# Patient Record
Sex: FEMALE | Race: WHITE | NOT HISPANIC OR LATINO | Employment: OTHER | URBAN - METROPOLITAN AREA
[De-identification: names, ages, dates, MRNs, and addresses within clinical notes are randomized per-mention and may not be internally consistent; named-entity substitution may affect disease eponyms.]

---

## 2017-05-02 ENCOUNTER — APPOINTMENT (OUTPATIENT)
Dept: PHYSICAL THERAPY | Facility: CLINIC | Age: 72
End: 2017-05-02
Payer: COMMERCIAL

## 2017-05-02 PROCEDURE — 97161 PT EVAL LOW COMPLEX 20 MIN: CPT

## 2017-05-02 PROCEDURE — 97140 MANUAL THERAPY 1/> REGIONS: CPT

## 2017-05-05 ENCOUNTER — APPOINTMENT (OUTPATIENT)
Dept: PHYSICAL THERAPY | Facility: CLINIC | Age: 72
End: 2017-05-05
Payer: COMMERCIAL

## 2017-05-05 PROCEDURE — 97140 MANUAL THERAPY 1/> REGIONS: CPT

## 2017-05-05 PROCEDURE — 97110 THERAPEUTIC EXERCISES: CPT

## 2017-05-08 ENCOUNTER — APPOINTMENT (OUTPATIENT)
Dept: PHYSICAL THERAPY | Facility: CLINIC | Age: 72
End: 2017-05-08
Payer: COMMERCIAL

## 2017-05-08 PROCEDURE — 97140 MANUAL THERAPY 1/> REGIONS: CPT

## 2017-05-08 PROCEDURE — 97110 THERAPEUTIC EXERCISES: CPT

## 2017-05-10 ENCOUNTER — APPOINTMENT (OUTPATIENT)
Dept: PHYSICAL THERAPY | Facility: CLINIC | Age: 72
End: 2017-05-10
Payer: COMMERCIAL

## 2017-05-10 PROCEDURE — 97110 THERAPEUTIC EXERCISES: CPT

## 2017-05-10 PROCEDURE — 97140 MANUAL THERAPY 1/> REGIONS: CPT

## 2017-05-12 ENCOUNTER — APPOINTMENT (OUTPATIENT)
Dept: PHYSICAL THERAPY | Facility: CLINIC | Age: 72
End: 2017-05-12
Payer: COMMERCIAL

## 2017-05-12 PROCEDURE — 97110 THERAPEUTIC EXERCISES: CPT

## 2017-05-12 PROCEDURE — 97140 MANUAL THERAPY 1/> REGIONS: CPT

## 2017-05-15 ENCOUNTER — APPOINTMENT (OUTPATIENT)
Dept: PHYSICAL THERAPY | Facility: CLINIC | Age: 72
End: 2017-05-15
Payer: COMMERCIAL

## 2017-05-15 PROCEDURE — 97140 MANUAL THERAPY 1/> REGIONS: CPT

## 2017-05-15 PROCEDURE — 97110 THERAPEUTIC EXERCISES: CPT

## 2017-05-16 ENCOUNTER — APPOINTMENT (OUTPATIENT)
Dept: PHYSICAL THERAPY | Facility: CLINIC | Age: 72
End: 2017-05-16
Payer: COMMERCIAL

## 2017-05-16 PROCEDURE — 97110 THERAPEUTIC EXERCISES: CPT

## 2017-05-16 PROCEDURE — 97140 MANUAL THERAPY 1/> REGIONS: CPT

## 2017-05-18 ENCOUNTER — APPOINTMENT (OUTPATIENT)
Dept: PHYSICAL THERAPY | Facility: CLINIC | Age: 72
End: 2017-05-18
Payer: COMMERCIAL

## 2017-05-18 PROCEDURE — 97110 THERAPEUTIC EXERCISES: CPT

## 2017-05-18 PROCEDURE — 97140 MANUAL THERAPY 1/> REGIONS: CPT

## 2017-05-19 ENCOUNTER — APPOINTMENT (OUTPATIENT)
Dept: PHYSICAL THERAPY | Facility: CLINIC | Age: 72
End: 2017-05-19
Payer: COMMERCIAL

## 2017-05-22 ENCOUNTER — APPOINTMENT (OUTPATIENT)
Dept: PHYSICAL THERAPY | Facility: CLINIC | Age: 72
End: 2017-05-22
Payer: COMMERCIAL

## 2017-05-22 PROCEDURE — 97110 THERAPEUTIC EXERCISES: CPT

## 2017-05-22 PROCEDURE — 97140 MANUAL THERAPY 1/> REGIONS: CPT

## 2017-05-23 ENCOUNTER — APPOINTMENT (OUTPATIENT)
Dept: PHYSICAL THERAPY | Facility: CLINIC | Age: 72
End: 2017-05-23
Payer: COMMERCIAL

## 2017-05-23 PROCEDURE — 97140 MANUAL THERAPY 1/> REGIONS: CPT

## 2017-05-23 PROCEDURE — 97110 THERAPEUTIC EXERCISES: CPT

## 2017-05-26 ENCOUNTER — APPOINTMENT (OUTPATIENT)
Dept: PHYSICAL THERAPY | Facility: CLINIC | Age: 72
End: 2017-05-26
Payer: COMMERCIAL

## 2017-05-26 PROCEDURE — 97140 MANUAL THERAPY 1/> REGIONS: CPT

## 2017-05-26 PROCEDURE — 97110 THERAPEUTIC EXERCISES: CPT

## 2017-05-30 ENCOUNTER — APPOINTMENT (OUTPATIENT)
Dept: PHYSICAL THERAPY | Facility: CLINIC | Age: 72
End: 2017-05-30
Payer: COMMERCIAL

## 2017-05-30 PROCEDURE — 97010 HOT OR COLD PACKS THERAPY: CPT

## 2017-05-30 PROCEDURE — 97110 THERAPEUTIC EXERCISES: CPT

## 2017-05-30 PROCEDURE — 97140 MANUAL THERAPY 1/> REGIONS: CPT

## 2017-06-01 ENCOUNTER — APPOINTMENT (OUTPATIENT)
Dept: PHYSICAL THERAPY | Facility: CLINIC | Age: 72
End: 2017-06-01
Payer: COMMERCIAL

## 2017-06-01 PROCEDURE — 97140 MANUAL THERAPY 1/> REGIONS: CPT

## 2017-06-01 PROCEDURE — 97110 THERAPEUTIC EXERCISES: CPT

## 2017-06-02 ENCOUNTER — APPOINTMENT (OUTPATIENT)
Dept: PHYSICAL THERAPY | Facility: CLINIC | Age: 72
End: 2017-06-02
Payer: COMMERCIAL

## 2017-06-02 PROCEDURE — 97110 THERAPEUTIC EXERCISES: CPT

## 2017-06-02 PROCEDURE — 97140 MANUAL THERAPY 1/> REGIONS: CPT

## 2017-07-10 ENCOUNTER — APPOINTMENT (OUTPATIENT)
Dept: PHYSICAL THERAPY | Facility: CLINIC | Age: 72
End: 2017-07-10
Payer: COMMERCIAL

## 2017-07-10 PROCEDURE — 97110 THERAPEUTIC EXERCISES: CPT

## 2017-07-10 PROCEDURE — G8990 OTHER PT/OT CURRENT STATUS: HCPCS | Performed by: PHYSICAL THERAPIST

## 2017-07-10 PROCEDURE — G8991 OTHER PT/OT GOAL STATUS: HCPCS | Performed by: PHYSICAL THERAPIST

## 2017-07-10 PROCEDURE — 97162 PT EVAL MOD COMPLEX 30 MIN: CPT

## 2017-07-14 ENCOUNTER — APPOINTMENT (OUTPATIENT)
Dept: PHYSICAL THERAPY | Facility: CLINIC | Age: 72
End: 2017-07-14
Payer: COMMERCIAL

## 2017-07-14 PROCEDURE — 97110 THERAPEUTIC EXERCISES: CPT

## 2017-07-17 ENCOUNTER — APPOINTMENT (OUTPATIENT)
Dept: PHYSICAL THERAPY | Facility: CLINIC | Age: 72
End: 2017-07-17
Payer: COMMERCIAL

## 2017-07-17 PROCEDURE — 97110 THERAPEUTIC EXERCISES: CPT

## 2017-07-20 ENCOUNTER — APPOINTMENT (OUTPATIENT)
Dept: PHYSICAL THERAPY | Facility: CLINIC | Age: 72
End: 2017-07-20
Payer: COMMERCIAL

## 2017-07-20 PROCEDURE — 97110 THERAPEUTIC EXERCISES: CPT

## 2017-07-24 ENCOUNTER — APPOINTMENT (OUTPATIENT)
Dept: PHYSICAL THERAPY | Facility: CLINIC | Age: 72
End: 2017-07-24
Payer: COMMERCIAL

## 2017-07-24 PROCEDURE — 97110 THERAPEUTIC EXERCISES: CPT

## 2017-07-27 ENCOUNTER — APPOINTMENT (OUTPATIENT)
Dept: PHYSICAL THERAPY | Facility: CLINIC | Age: 72
End: 2017-07-27
Payer: COMMERCIAL

## 2017-07-27 PROCEDURE — 97110 THERAPEUTIC EXERCISES: CPT

## 2017-08-01 ENCOUNTER — APPOINTMENT (OUTPATIENT)
Dept: PHYSICAL THERAPY | Facility: CLINIC | Age: 72
End: 2017-08-01
Payer: COMMERCIAL

## 2017-08-01 PROCEDURE — 97110 THERAPEUTIC EXERCISES: CPT

## 2017-08-03 ENCOUNTER — APPOINTMENT (OUTPATIENT)
Dept: PHYSICAL THERAPY | Facility: CLINIC | Age: 72
End: 2017-08-03
Payer: COMMERCIAL

## 2017-08-03 PROCEDURE — 97110 THERAPEUTIC EXERCISES: CPT

## 2017-08-16 ENCOUNTER — APPOINTMENT (OUTPATIENT)
Dept: PHYSICAL THERAPY | Facility: CLINIC | Age: 72
End: 2017-08-16
Payer: COMMERCIAL

## 2017-08-16 PROCEDURE — 97110 THERAPEUTIC EXERCISES: CPT

## 2017-08-18 ENCOUNTER — APPOINTMENT (OUTPATIENT)
Dept: PHYSICAL THERAPY | Facility: CLINIC | Age: 72
End: 2017-08-18
Payer: COMMERCIAL

## 2017-08-18 PROCEDURE — 97110 THERAPEUTIC EXERCISES: CPT

## 2017-08-23 ENCOUNTER — APPOINTMENT (OUTPATIENT)
Dept: PHYSICAL THERAPY | Facility: CLINIC | Age: 72
End: 2017-08-23
Payer: COMMERCIAL

## 2017-08-23 PROCEDURE — 97110 THERAPEUTIC EXERCISES: CPT

## 2017-08-25 ENCOUNTER — APPOINTMENT (OUTPATIENT)
Dept: PHYSICAL THERAPY | Facility: CLINIC | Age: 72
End: 2017-08-25
Payer: COMMERCIAL

## 2017-08-25 PROCEDURE — 97110 THERAPEUTIC EXERCISES: CPT

## 2017-08-31 ENCOUNTER — APPOINTMENT (OUTPATIENT)
Dept: PHYSICAL THERAPY | Facility: CLINIC | Age: 72
End: 2017-08-31
Payer: COMMERCIAL

## 2017-08-31 PROCEDURE — 97110 THERAPEUTIC EXERCISES: CPT

## 2018-03-14 ENCOUNTER — TRANSCRIBE ORDERS (OUTPATIENT)
Dept: LAB | Facility: CLINIC | Age: 73
End: 2018-03-14

## 2018-03-14 ENCOUNTER — APPOINTMENT (OUTPATIENT)
Dept: LAB | Facility: CLINIC | Age: 73
End: 2018-03-14
Payer: COMMERCIAL

## 2018-03-14 DIAGNOSIS — E78.5 HYPERLIPIDEMIA, UNSPECIFIED HYPERLIPIDEMIA TYPE: Primary | ICD-10-CM

## 2018-03-14 DIAGNOSIS — M79.10 MYALGIA: ICD-10-CM

## 2018-03-14 DIAGNOSIS — R73.01 IMPAIRED FASTING GLUCOSE: ICD-10-CM

## 2018-03-14 DIAGNOSIS — D05.00 LOBULAR CARCINOMA IN SITU OF BREAST, UNSPECIFIED LATERALITY: ICD-10-CM

## 2018-03-14 LAB
ALBUMIN SERPL BCP-MCNC: 3.9 G/DL (ref 3.5–5)
ALP SERPL-CCNC: 82 U/L (ref 46–116)
ALT SERPL W P-5'-P-CCNC: 42 U/L (ref 12–78)
ANION GAP SERPL CALCULATED.3IONS-SCNC: 5 MMOL/L (ref 4–13)
AST SERPL W P-5'-P-CCNC: 18 U/L (ref 5–45)
BASOPHILS # BLD AUTO: 0.06 THOUSANDS/ΜL (ref 0–0.1)
BASOPHILS NFR BLD AUTO: 1 % (ref 0–1)
BILIRUB SERPL-MCNC: 0.46 MG/DL (ref 0.2–1)
BUN SERPL-MCNC: 16 MG/DL (ref 5–25)
CALCIUM SERPL-MCNC: 9.1 MG/DL (ref 8.3–10.1)
CHLORIDE SERPL-SCNC: 106 MMOL/L (ref 100–108)
CHOLEST SERPL-MCNC: 248 MG/DL (ref 50–200)
CO2 SERPL-SCNC: 30 MMOL/L (ref 21–32)
CREAT SERPL-MCNC: 0.75 MG/DL (ref 0.6–1.3)
EOSINOPHIL # BLD AUTO: 0.13 THOUSAND/ΜL (ref 0–0.61)
EOSINOPHIL NFR BLD AUTO: 2 % (ref 0–6)
ERYTHROCYTE [DISTWIDTH] IN BLOOD BY AUTOMATED COUNT: 13.5 % (ref 11.6–15.1)
ERYTHROCYTE [SEDIMENTATION RATE] IN BLOOD: 7 MM/HOUR (ref 0–20)
EST. AVERAGE GLUCOSE BLD GHB EST-MCNC: 120 MG/DL
GFR SERPL CREATININE-BSD FRML MDRD: 79 ML/MIN/1.73SQ M
GLUCOSE P FAST SERPL-MCNC: 86 MG/DL (ref 65–99)
HBA1C MFR BLD: 5.8 % (ref 4.2–6.3)
HCT VFR BLD AUTO: 46.6 % (ref 34.8–46.1)
HDLC SERPL-MCNC: 61 MG/DL (ref 40–60)
HGB BLD-MCNC: 15.4 G/DL (ref 11.5–15.4)
LDLC SERPL CALC-MCNC: 157 MG/DL (ref 0–100)
LYMPHOCYTES # BLD AUTO: 2.58 THOUSANDS/ΜL (ref 0.6–4.47)
LYMPHOCYTES NFR BLD AUTO: 48 % (ref 14–44)
MCH RBC QN AUTO: 29.5 PG (ref 26.8–34.3)
MCHC RBC AUTO-ENTMCNC: 33 G/DL (ref 31.4–37.4)
MCV RBC AUTO: 89 FL (ref 82–98)
MONOCYTES # BLD AUTO: 0.51 THOUSAND/ΜL (ref 0.17–1.22)
MONOCYTES NFR BLD AUTO: 9 % (ref 4–12)
NEUTROPHILS # BLD AUTO: 2.23 THOUSANDS/ΜL (ref 1.85–7.62)
NEUTS SEG NFR BLD AUTO: 40 % (ref 43–75)
NRBC BLD AUTO-RTO: 0 /100 WBCS
PLATELET # BLD AUTO: 412 THOUSANDS/UL (ref 149–390)
PMV BLD AUTO: 10 FL (ref 8.9–12.7)
POTASSIUM SERPL-SCNC: 4 MMOL/L (ref 3.5–5.3)
PROT SERPL-MCNC: 7.6 G/DL (ref 6.4–8.2)
RBC # BLD AUTO: 5.22 MILLION/UL (ref 3.81–5.12)
SODIUM SERPL-SCNC: 141 MMOL/L (ref 136–145)
TRIGL SERPL-MCNC: 151 MG/DL
WBC # BLD AUTO: 5.52 THOUSAND/UL (ref 4.31–10.16)

## 2018-03-14 PROCEDURE — 80061 LIPID PANEL: CPT

## 2018-03-14 PROCEDURE — 86147 CARDIOLIPIN ANTIBODY EA IG: CPT

## 2018-03-14 PROCEDURE — 85025 COMPLETE CBC W/AUTO DIFF WBC: CPT

## 2018-03-14 PROCEDURE — 83036 HEMOGLOBIN GLYCOSYLATED A1C: CPT

## 2018-03-14 PROCEDURE — 86038 ANTINUCLEAR ANTIBODIES: CPT

## 2018-03-14 PROCEDURE — 80053 COMPREHEN METABOLIC PANEL: CPT

## 2018-03-14 PROCEDURE — 36415 COLL VENOUS BLD VENIPUNCTURE: CPT

## 2018-03-14 PROCEDURE — 85652 RBC SED RATE AUTOMATED: CPT

## 2018-03-15 LAB
CARDIOLIPIN IGA SER IA-ACNC: <9 APL U/ML (ref 0–11)
CARDIOLIPIN IGG SER IA-ACNC: <9 GPL U/ML (ref 0–14)
CARDIOLIPIN IGM SER IA-ACNC: <9 MPL U/ML (ref 0–12)

## 2018-03-16 LAB — RYE IGE QN: NEGATIVE

## 2018-05-07 ENCOUNTER — TELEPHONE (OUTPATIENT)
Dept: INTERNAL MEDICINE CLINIC | Facility: CLINIC | Age: 73
End: 2018-05-07

## 2018-11-13 RX ORDER — OXYCODONE HYDROCHLORIDE 5 MG/1
5-10 TABLET ORAL EVERY 4 HOURS
COMMUNITY
Start: 2017-04-13 | End: 2018-11-14 | Stop reason: ALTCHOICE

## 2018-11-13 RX ORDER — ATORVASTATIN CALCIUM 10 MG/1
TABLET, FILM COATED ORAL
Refills: 0 | COMMUNITY
Start: 2018-10-25

## 2018-11-13 RX ORDER — CELECOXIB 100 MG/1
100 CAPSULE ORAL
COMMUNITY
Start: 2017-04-13 | End: 2018-11-14 | Stop reason: ALTCHOICE

## 2018-11-13 RX ORDER — ACETAMINOPHEN 500 MG
500 TABLET ORAL EVERY 6 HOURS
COMMUNITY

## 2018-11-14 ENCOUNTER — CONSULT (OUTPATIENT)
Dept: VASCULAR SURGERY | Facility: CLINIC | Age: 73
End: 2018-11-14
Payer: COMMERCIAL

## 2018-11-14 VITALS
DIASTOLIC BLOOD PRESSURE: 64 MMHG | RESPIRATION RATE: 18 BRPM | TEMPERATURE: 98.4 F | HEART RATE: 64 BPM | HEIGHT: 66 IN | BODY MASS INDEX: 26.2 KG/M2 | SYSTOLIC BLOOD PRESSURE: 112 MMHG | WEIGHT: 163 LBS

## 2018-11-14 DIAGNOSIS — I83.893 SYMPTOMATIC VARICOSE VEINS OF BOTH LOWER EXTREMITIES: Primary | ICD-10-CM

## 2018-11-14 DIAGNOSIS — I87.2 VENOUS INSUFFICIENCY OF BOTH LOWER EXTREMITIES: ICD-10-CM

## 2018-11-14 PROCEDURE — 99204 OFFICE O/P NEW MOD 45 MIN: CPT | Performed by: PHYSICIAN ASSISTANT

## 2018-11-14 NOTE — PATIENT INSTRUCTIONS
Bilateral varicose veins  Concern for venous insufficiency     We had a detailed discussion regarding Stormy's varicose veins, as well as the pthophysiology and treatment of venous disease  We discussed that treatment is symptoms driven  Nye Latin has daily symptoms of tired, aching, painful legs L>R which has been ongoing for 1 5 years  The legs are worse when she is on her feet all day and better when she raises her legs  She does not use compression stockings  She has had no significant pain in the veins themselves  No history of redness  No history of phlebitis or blood clots  We discussed the role of a formal, graded compression stockings  She is given a script for compression stockings and will see us back as needed  Recommendations:  - Order for prescription graded compression stockings of 20-30 mm Hg  - Wear stocking EVERY day to help control swelling in legs and remove at night  - Elevate legs while at rest  - You can use a good moisturizer to legs to help with skin hygiene  - If you get a "flare up" of pain in vein, redness - please let us know and come back sooner  - Continue healthy life-style changes; heart-healthy, low sodium diet; regular exercise for weight loss  - Patient education for venous insufficiency  - Follow up as needed for any worsening of symptoms - swelling, pain, fatigue, aching, heaviness  Varicose Veins, Ambulatory Care   GENERAL INFORMATION:   Varicose veins  are veins that become large, twisted, and swollen  They are common on the back of your calves, knees, and thighs     Common symptoms include the following:   · Blue, purple, or bulging veins in your legs     · Pain, swelling, or muscle cramps in your legs    · Feeling of heaviness in your legs  Seek immediate care for the following symptoms:   · A wound that does not heal or is infected    · An injury that has broken your skin and caused your varicose veins to bleed    · Swollen and hard leg    · Pain in your leg that does not go away or gets worse    · Legs or feet turn blue or black    · Warmth, tenderness, and pain in your leg (may also look swollen and red)  Treatment of varicose veins  aims to decrease symptoms, improve appearance, and prevent further problems  Treatment will depend on which veins are affected and how severe your condition is  Prescription pain medicine may be given  Ask how to take this medicine safely  Procedures may be done to remove your varicose veins  Your healthcare provider may inject a solution or use a laser to close the varicose veins  Surgery to remove long veins may also be done  Ask your healthcare provider for more information about procedures used in treating varicose veins  Manage varicose veins:   · Wear pressure stockings  The stockings are tight and put pressure on your legs  They improve blood flow and help prevent clots  · Elevate  your legs above the level of your heart for 15 to 30 minutes several times a day  This will help blood to flow back to your heart  · Avoid sitting or standing for long periods of time  This can cause the blood to collect in your legs and make your symptoms worse  Walk around for a few minutes every hour to get blood moving in your legs  · Avoid wearing tight clothing and shoes  Avoid wearing high-heeled shoes  Do not wear clothes that are tight around the waist     · Get plenty of exercise  Talk to your healthcare provider about the best exercise plan for you  Exercise can decrease your blood pressure and improve your health  Bend or rotate your ankles several times every hour  This will help blood to flow back to the heart  · Maintain a healthy weight  Your heart works harder when you are overweight and this can make varicose vein worse  Ask how much you should weigh  Ask him to help you create a weight loss plan if you are overweight  · Do not smoke  If you smoke, it is never too late to quit   Ask for information if you need help quitting  Follow up with your healthcare provider as directed:  Write down your questions so you remember to ask them during your visits  CARE AGREEMENT:   You have the right to help plan your care  Learn about your health condition and how it may be treated  Discuss treatment options with your caregivers to decide what care you want to receive  You always have the right to refuse treatment  The above information is an  only  It is not intended as medical advice for individual conditions or treatments  Talk to your doctor, nurse or pharmacist before following any medical regimen to see if it is safe and effective for you  © 2014 [address] is for End User's use only and may not be sold, redistributed or otherwise used for commercial purposes  All illustrations and images included in CareNotes® are the copyrighted property of [employer]  or Diallo Suarez

## 2018-11-14 NOTE — PROGRESS NOTES
Assessment/Plan:    Venous insufficiency of both lower extremities  -  Daily symptoms of tired, aching, painful legs (mostly L) with mild swelling and bilateral venous varicosities  -  Will give trial of graded compression stockings and she will follow up as needed  The detailed plan as per varicose veins  Symptomatic varicose veins of both lower extremities  79 y/o F with daily symptoms of tired, aching, painful legs L>R which has been ongoing for 1 5 years  The legs are worse when she is on her feet all day and better when she raises her legs  She does not use compression stockings  She has had no significant pain in the veins themselves  No history of redness  No history of phlebitis or blood clots  We had a detailed discussion regarding Stormy's varicose veins, as well as the pathophysiology and treatment of venous disease  We discussed that treatment is symptoms driven  I reassured Britt Alisson that her vein disease is not dangerous  There are no high risk features  We discussed the role of a formal, graded compression stockings  She is given a script for compression stockings and will see us back as needed  However, should she continue to have symptoms despite use of compression and conservative measures, or if she the develops pain,  swelling or redness of the veins themselves we should see her back  - Prescription for graded compression stockings 20-30 mm  - Patient education provided for venous insufficiency and healthy-lifestyle changes  - She will follow-up as needed for continued or worsening symptoms         Subjective:      Patient ID: Pamela Haynes is a 75 y o  female  Patient is new to our practice  She has no recent testing  She has bulging painful veins in her L leg  She states she has bulging veins on her right leg as well but L >R  She does not wear compression stockings  She does elevate her legs as she can  She takes ASA and atorvastatin        HPI   Britt Vinson 74 y/o F HLD, Hx retinal edema who comes in to discuss varicose veins  Veins have been present for over 20 years  She has a left medial thigh large truncal vein that bulges which she is quite concerned about  The vein does not particularly give her any problems such as pain, tenderness or redness  She complains of daily symptoms of tired, aching, painful legs L>R which has been ongoing for 1 5 years  The legs are worse when she is on her feet all day and better when she raises her legs  Interestingly, she states that the symptoms began after her left knee replacement  She does not use compression stockings  No history of redness  No history of phlebitis or blood clots  She has had longstanding varicose veins  She might be interested in having a laser ablation to the left medial truncal veins  I explained to her that this is a possibility  However, generally treatment is symptom based and I recommend that we give a trial of compression stockings and see how she does  20 years ago she had some injections of smaller veins in both legs  She ended up with worsening veins to the back of the right knee after that procedure  The following portions of the patient's history were reviewed and updated as appropriate: allergies, current medications, past family history, past medical history, past social history, past surgical history and problem list     Review of Systems   Constitutional: Negative  HENT: Negative  Eyes: Negative  Respiratory: Negative  Cardiovascular: Negative  Endocrine: Negative  Genitourinary: Negative  Musculoskeletal: Negative  Skin: Negative  Allergic/Immunologic: Negative  Neurological: Negative  Hematological: Negative  Psychiatric/Behavioral: Negative          Objective:  /64 (BP Location: Left arm, Patient Position: Sitting)   Pulse 64   Temp 98 4 °F (36 9 °C)   Resp 18   Ht 5' 6" (1 676 m)   Wt 73 9 kg (163 lb)   BMI 26 31 kg/m²      Physical Exam Constitutional: She is oriented to person, place, and time  She appears well-developed and well-nourished  She is cooperative  HENT:   Head: Normocephalic and atraumatic  Eyes: Pupils are equal, round, and reactive to light  EOM are normal    Neck: Trachea normal  Neck supple  No JVD present  No thyromegaly present  Cardiovascular: Normal rate, regular rhythm, S1 normal, S2 normal and normal heart sounds  Exam reveals no gallop and no friction rub  No murmur heard  Pulses:       Carotid pulses are 2+ on the right side, and 2+ on the left side  Radial pulses are 2+ on the right side, and 2+ on the left side  Popliteal pulses are 2+ on the right side, and 2+ on the left side  Dorsalis pedis pulses are 2+ on the right side, and 2+ on the left side  Posterior tibial pulses are 2+ on the right side, and 2+ on the left side  1-2 + DP/PT palpable pulses    The feet are warm and well perfused    Mild LE edema - mostly ankle; very mild venous stasis changes    + bilat varicose veins     L anterio-medial truncal vein; bilateral smaller spiders   Pulmonary/Chest: Effort normal and breath sounds normal  No accessory muscle usage  No respiratory distress  She has no wheezes  She has no rales  Abdominal: Soft  Bowel sounds are normal  She exhibits no distension  There is no hepatosplenomegaly  There is no tenderness  obese   Musculoskeletal: Normal range of motion  She exhibits edema  She exhibits no deformity  Neurological: She is alert and oriented to person, place, and time  Grossly normal    Skin: Skin is warm and dry  No lesion and no rash noted  No cyanosis  Nails show no clubbing  Psychiatric: She has a normal mood and affect  Nursing note and vitals reviewed                  R posterior lower leg        LEFT anterior-medial      Vitals:    11/14/18 1114   BP: 112/64   BP Location: Left arm   Patient Position: Sitting   Pulse: 64   Resp: 18   Temp: 98 4 °F (36 9 °C) Weight: 73 9 kg (163 lb)   Height: 5' 6" (1 676 m)       Patient Active Problem List   Diagnosis    Symptomatic varicose veins of both lower extremities    Venous insufficiency of both lower extremities       Past Surgical History:   Procedure Laterality Date    BACK SURGERY      BREAST SURGERY      FOOT SURGERY      HIP SURGERY Left     KNEE SURGERY Left        Family History   Problem Relation Age of Onset    Heart disease Mother     Cancer Mother     Varicose Veins Mother     Heart disease Father     Heart disease Sister     Cancer Sister     Cancer Brother     Varicose Veins Maternal Grandmother        Social History     Social History    Marital status: /Civil Union     Spouse name: N/A    Number of children: N/A    Years of education: N/A     Occupational History    Not on file       Social History Main Topics    Smoking status: Never Smoker    Smokeless tobacco: Never Used    Alcohol use Yes      Comment: rarely    Drug use: No    Sexual activity: Not on file     Other Topics Concern    Not on file     Social History Narrative    No narrative on file       Allergies   Allergen Reactions    Tramadol GI Intolerance         Current Outpatient Prescriptions:     acetaminophen (TYLENOL) 500 mg tablet, Take 500 mg by mouth every 6 (six) hours, Disp: , Rfl:     aspirin 81 MG tablet, Take 81 mg by mouth, Disp: , Rfl:     atorvastatin (LIPITOR) 10 mg tablet, , Disp: , Rfl: 0    Multiple Vitamins-Minerals (MULTIVITAMIN ADULT PO), Take 1 tablet by mouth, Disp: , Rfl:     Elastic Bandages & Supports ([address]) MISC, by Does not apply route daily Knee High 20-30mmHg, Disp: 2 each, Rfl: 4

## 2018-11-14 NOTE — LETTER
November 14, 2018     Story DO Javier  [id number]    Patient: Junie Lindsey   YOB: 1945   Date of Visit: 11/14/2018     Dear Dr Trevor Sal      Thank you for referring Lucius Martinez to me for evaluation  Below are the relevant portions of my assessment and plan of care  If you have questions, please do not hesitate to call me  I look forward to following Ame Mclaughlin along with you  Sincerely,        Pamela Higuera PA-C        CC: No Recipients    Progress Notes:    Venous insufficiency of both lower extremities  -  Daily symptoms of tired, aching, painful legs (mostly L) with mild swelling and bilateral venous varicosities  -  Will give trial of graded compression stockings and she will follow up as needed  The detailed plan as per varicose veins  Symptomatic varicose veins of both lower extremities  77 y/o F with daily symptoms of tired, aching, painful legs L>R which has been ongoing for 1 5 years  The legs are worse when she is on her feet all day and better when she raises her legs  She does not use compression stockings  She has had no significant pain in the veins themselves  No history of redness  No history of phlebitis or blood clots  We had a detailed discussion regarding Stormy's varicose veins, as well as the pathophysiology and treatment of venous disease  We discussed that treatment is symptoms driven  I reassured Ame Mclaughlin that her vein disease is not dangerous  There are no high risk features  We discussed the role of a formal, graded compression stockings  She is given a script for compression stockings and will see us back as needed  However, should she continue to have symptoms despite use of compression and conservative measures, or if she the develops pain,  swelling or redness of the veins themselves we should see her back      - Prescription for graded compression stockings 20-30 mm  - Patient education provided for venous insufficiency and healthy-lifestyle changes  - She will follow-up as needed for continued or worsening symptoms

## 2018-11-14 NOTE — ASSESSMENT & PLAN NOTE
-  Daily symptoms of tired, aching, painful legs (mostly L) with mild swelling and bilateral venous varicosities  -  Will give trial of graded compression stockings and she will follow up as needed  The detailed plan as per varicose veins

## 2018-11-14 NOTE — ASSESSMENT & PLAN NOTE
79 y/o F with daily symptoms of tired, aching, painful legs L>R which has been ongoing for 1 5 years  The legs are worse when she is on her feet all day and better when she raises her legs  She does not use compression stockings  She has had no significant pain in the veins themselves  No history of redness  No history of phlebitis or blood clots  We had a detailed discussion regarding Stormy's varicose veins, as well as the pathophysiology and treatment of venous disease  We discussed that treatment is symptoms driven  I reassured Rebeca Hoffmann that her vein disease is not dangerous  There are no high risk features  We discussed the role of a formal, graded compression stockings  She is given a script for compression stockings and will see us back as needed  However, should she continue to have symptoms despite use of compression and conservative measures, or if she the develops pain,  swelling or redness of the veins themselves we should see her back      - Prescription for graded compression stockings 20-30 mm  - Patient education provided for venous insufficiency and healthy-lifestyle changes  - She will follow-up as needed for continued or worsening symptoms

## 2020-10-16 ENCOUNTER — TRANSCRIBE ORDERS (OUTPATIENT)
Dept: ADMINISTRATIVE | Facility: HOSPITAL | Age: 75
End: 2020-10-16

## 2020-10-16 DIAGNOSIS — Z12.31 ENCOUNTER FOR SCREENING MAMMOGRAM FOR MALIGNANT NEOPLASM OF BREAST: Primary | ICD-10-CM

## 2021-09-28 ENCOUNTER — APPOINTMENT (OUTPATIENT)
Dept: LAB | Facility: CLINIC | Age: 76
End: 2021-09-28
Payer: COMMERCIAL

## 2021-09-28 DIAGNOSIS — E83.42 HYPOMAGNESEMIA: ICD-10-CM

## 2021-09-28 DIAGNOSIS — R73.01 IMPAIRED FASTING GLUCOSE: ICD-10-CM

## 2021-09-28 DIAGNOSIS — D51.9 ANEMIA DUE TO VITAMIN B12 DEFICIENCY, UNSPECIFIED B12 DEFICIENCY TYPE: ICD-10-CM

## 2021-09-28 DIAGNOSIS — E55.9 AVITAMINOSIS D: ICD-10-CM

## 2021-09-28 DIAGNOSIS — E78.2 MIXED HYPERLIPIDEMIA: ICD-10-CM

## 2021-09-28 LAB
25(OH)D3 SERPL-MCNC: 34.8 NG/ML (ref 30–100)
ALBUMIN SERPL BCP-MCNC: 3.4 G/DL (ref 3.5–5)
ALP SERPL-CCNC: 83 U/L (ref 46–116)
ALT SERPL W P-5'-P-CCNC: 28 U/L (ref 12–78)
ANION GAP SERPL CALCULATED.3IONS-SCNC: 3 MMOL/L (ref 4–13)
AST SERPL W P-5'-P-CCNC: 15 U/L (ref 5–45)
BASOPHILS # BLD AUTO: 0.08 THOUSANDS/ΜL (ref 0–0.1)
BASOPHILS NFR BLD AUTO: 1 % (ref 0–1)
BILIRUB SERPL-MCNC: 0.46 MG/DL (ref 0.2–1)
BUN SERPL-MCNC: 15 MG/DL (ref 5–25)
CALCIUM ALBUM COR SERPL-MCNC: 9.4 MG/DL (ref 8.3–10.1)
CALCIUM SERPL-MCNC: 8.9 MG/DL (ref 8.3–10.1)
CHLORIDE SERPL-SCNC: 110 MMOL/L (ref 100–108)
CHOLEST SERPL-MCNC: 171 MG/DL (ref 50–200)
CO2 SERPL-SCNC: 29 MMOL/L (ref 21–32)
CREAT SERPL-MCNC: 0.7 MG/DL (ref 0.6–1.3)
EOSINOPHIL # BLD AUTO: 0.27 THOUSAND/ΜL (ref 0–0.61)
EOSINOPHIL NFR BLD AUTO: 4 % (ref 0–6)
ERYTHROCYTE [DISTWIDTH] IN BLOOD BY AUTOMATED COUNT: 13.3 % (ref 11.6–15.1)
EST. AVERAGE GLUCOSE BLD GHB EST-MCNC: 117 MG/DL
GFR SERPL CREATININE-BSD FRML MDRD: 84 ML/MIN/1.73SQ M
GLUCOSE P FAST SERPL-MCNC: 91 MG/DL (ref 65–99)
HBA1C MFR BLD: 5.7 %
HCT VFR BLD AUTO: 46.3 % (ref 34.8–46.1)
HDLC SERPL-MCNC: 66 MG/DL
HGB BLD-MCNC: 14.8 G/DL (ref 11.5–15.4)
IMM GRANULOCYTES # BLD AUTO: 0.01 THOUSAND/UL (ref 0–0.2)
IMM GRANULOCYTES NFR BLD AUTO: 0 % (ref 0–2)
LDLC SERPL CALC-MCNC: 87 MG/DL (ref 0–100)
LYMPHOCYTES # BLD AUTO: 2.51 THOUSANDS/ΜL (ref 0.6–4.47)
LYMPHOCYTES NFR BLD AUTO: 39 % (ref 14–44)
MAGNESIUM SERPL-MCNC: 2.6 MG/DL (ref 1.6–2.6)
MCH RBC QN AUTO: 29.5 PG (ref 26.8–34.3)
MCHC RBC AUTO-ENTMCNC: 32 G/DL (ref 31.4–37.4)
MCV RBC AUTO: 92 FL (ref 82–98)
MONOCYTES # BLD AUTO: 0.6 THOUSAND/ΜL (ref 0.17–1.22)
MONOCYTES NFR BLD AUTO: 9 % (ref 4–12)
NEUTROPHILS # BLD AUTO: 2.95 THOUSANDS/ΜL (ref 1.85–7.62)
NEUTS SEG NFR BLD AUTO: 47 % (ref 43–75)
NONHDLC SERPL-MCNC: 105 MG/DL
NRBC BLD AUTO-RTO: 0 /100 WBCS
PLATELET # BLD AUTO: 376 THOUSANDS/UL (ref 149–390)
PMV BLD AUTO: 10.3 FL (ref 8.9–12.7)
POTASSIUM SERPL-SCNC: 4.3 MMOL/L (ref 3.5–5.3)
PROT SERPL-MCNC: 7 G/DL (ref 6.4–8.2)
RBC # BLD AUTO: 5.02 MILLION/UL (ref 3.81–5.12)
SODIUM SERPL-SCNC: 142 MMOL/L (ref 136–145)
TRIGL SERPL-MCNC: 89 MG/DL
VIT B12 SERPL-MCNC: 832 PG/ML (ref 100–900)
WBC # BLD AUTO: 6.42 THOUSAND/UL (ref 4.31–10.16)

## 2021-09-28 PROCEDURE — 82607 VITAMIN B-12: CPT

## 2021-09-28 PROCEDURE — 80053 COMPREHEN METABOLIC PANEL: CPT

## 2021-09-28 PROCEDURE — 85025 COMPLETE CBC W/AUTO DIFF WBC: CPT

## 2021-09-28 PROCEDURE — 83036 HEMOGLOBIN GLYCOSYLATED A1C: CPT

## 2021-09-28 PROCEDURE — 80061 LIPID PANEL: CPT

## 2021-09-28 PROCEDURE — 36415 COLL VENOUS BLD VENIPUNCTURE: CPT

## 2021-09-28 PROCEDURE — 83735 ASSAY OF MAGNESIUM: CPT

## 2021-09-28 PROCEDURE — 82306 VITAMIN D 25 HYDROXY: CPT

## 2021-09-29 ENCOUNTER — HOSPITAL ENCOUNTER (OUTPATIENT)
Dept: RADIOLOGY | Age: 76
Discharge: HOME | End: 2021-09-29
Payer: COMMERCIAL

## 2021-09-29 DIAGNOSIS — Z78.0 ASYMPTOMATIC MENOPAUSAL STATE: ICD-10-CM

## 2021-09-29 PROCEDURE — 77080 DXA BONE DENSITY AXIAL: CPT

## 2022-03-30 ENCOUNTER — APPOINTMENT (OUTPATIENT)
Dept: LAB | Facility: CLINIC | Age: 77
End: 2022-03-30
Payer: COMMERCIAL

## 2022-03-30 DIAGNOSIS — M81.0 SENILE OSTEOPOROSIS: ICD-10-CM

## 2022-03-30 LAB
ANION GAP SERPL CALCULATED.3IONS-SCNC: 3 MMOL/L (ref 4–13)
BUN SERPL-MCNC: 20 MG/DL (ref 5–25)
CALCIUM SERPL-MCNC: 9.3 MG/DL (ref 8.3–10.1)
CHLORIDE SERPL-SCNC: 107 MMOL/L (ref 100–108)
CO2 SERPL-SCNC: 30 MMOL/L (ref 21–32)
CREAT SERPL-MCNC: 0.8 MG/DL (ref 0.6–1.3)
GFR SERPL CREATININE-BSD FRML MDRD: 71 ML/MIN/1.73SQ M
GLUCOSE SERPL-MCNC: 79 MG/DL (ref 65–140)
POTASSIUM SERPL-SCNC: 4.4 MMOL/L (ref 3.5–5.3)
SODIUM SERPL-SCNC: 140 MMOL/L (ref 136–145)

## 2022-03-30 PROCEDURE — 36415 COLL VENOUS BLD VENIPUNCTURE: CPT

## 2022-03-30 PROCEDURE — 80048 BASIC METABOLIC PNL TOTAL CA: CPT

## 2022-12-14 ENCOUNTER — HOSPITAL ENCOUNTER (OUTPATIENT)
Dept: RADIOLOGY | Facility: HOSPITAL | Age: 77
Discharge: HOME | End: 2022-12-14
Attending: PODIATRIST

## 2022-12-14 ENCOUNTER — OFFICE VISIT (OUTPATIENT)
Dept: PODIATRY | Facility: CLINIC | Age: 77
End: 2022-12-14

## 2022-12-14 VITALS
DIASTOLIC BLOOD PRESSURE: 100 MMHG | SYSTOLIC BLOOD PRESSURE: 154 MMHG | BODY MASS INDEX: 27.8 KG/M2 | WEIGHT: 173 LBS | OXYGEN SATURATION: 100 % | HEART RATE: 86 BPM | HEIGHT: 66 IN

## 2022-12-14 DIAGNOSIS — M77.52 CAPSULITIS OF METATARSOPHALANGEAL (MTP) JOINT OF LEFT FOOT: ICD-10-CM

## 2022-12-14 DIAGNOSIS — M79.672 LEFT FOOT PAIN: ICD-10-CM

## 2022-12-14 DIAGNOSIS — M79.672 LEFT FOOT PAIN: Primary | ICD-10-CM

## 2022-12-14 RX ORDER — OMEPRAZOLE 20 MG/1
20 CAPSULE, DELAYED RELEASE ORAL 2 TIMES DAILY
COMMUNITY
Start: 2022-11-17

## 2022-12-14 RX ADMIN — BUPIVACAINE HYDROCHLORIDE 0.5 ML: 2.5 INJECTION, SOLUTION INFILTRATION; PERINEURAL at 15:59

## 2022-12-14 NOTE — PROGRESS NOTES
Assessment/Plan:    X-rays of the patient's left foot taken today were negative for fracture or dislocation  Some mild scattered arthritic changes noted throughout the forefoot  Follow-up on official read  The patient's clinical examination is consistent with capsulitis of the left third metatarsophalangeal joint  Effects can also be from her recent introduction to Prolia for her osteoporosis  Treatment options were discussed with the patient and she would like to proceed with injection therapy  After sterile prep with alcohol of the third metatarsophalangeal joint, a steroid injection was administered through a dorsal approach into the third metatarsophalangeal joint without complication  She did note immediate relief after the injection  Recommend follow-up in 3 to 4 weeks  Diagnoses and all orders for this visit:    Left foot pain  -     X-ray foot left 3+ views; Future    Capsulitis of metatarsophalangeal (MTP) joint of left foot    Other orders  -     omeprazole (PriLOSEC) 20 mg delayed release capsule; Take 20 mg by mouth 2 (two) times a day          Subjective:      Patient ID: Lance Hernadez is a 65 y o  female  The patient presents today with a chief complaint of left third toe joint pain that has been present for a couple of months  She states that this issue started when she started Prolia for her osteoporosis  She denies any significant pain during today or with ambulation, her discomfort occurs at nighttime when she is laying in bed  She notes tenderness in the third toe joint and the sensation that her toe is stiffening up  She cannot recall any injury or trauma to the left foot  The discomfort occurs nightly and last about 30 minutes        The following portions of the patient's history were reviewed and updated as appropriate: allergies, current medications, past family history, past medical history, past social history, past surgical history and problem list       PAST MEDICAL HISTORY:  History reviewed  No pertinent past medical history  PAST SURGICAL HISTORY:  Past Surgical History:   Procedure Laterality Date   • BACK SURGERY     • BREAST SURGERY     • FOOT SURGERY     • HIP SURGERY Left    • KNEE SURGERY Left         ALLERGIES:  Tramadol    MEDICATIONS:  Current Outpatient Medications   Medication Sig Dispense Refill   • acetaminophen (TYLENOL) 500 mg tablet Take 500 mg by mouth every 6 (six) hours     • aspirin 81 MG tablet Take 81 mg by mouth     • atorvastatin (LIPITOR) 10 mg tablet   0   • Elastic Bandages & Supports (MEDICAL COMPRESSION STOCKINGS) MISC by Does not apply route daily Knee High 20-30mmHg 2 each 4   • Multiple Vitamins-Minerals (MULTIVITAMIN ADULT PO) Take 1 tablet by mouth     • omeprazole (PriLOSEC) 20 mg delayed release capsule Take 20 mg by mouth 2 (two) times a day       No current facility-administered medications for this visit  SOCIAL HISTORY:  Social History     Socioeconomic History   • Marital status: /Civil Union     Spouse name: None   • Number of children: None   • Years of education: None   • Highest education level: None   Occupational History   • None   Tobacco Use   • Smoking status: Never   • Smokeless tobacco: Never   Substance and Sexual Activity   • Alcohol use: Yes     Comment: rarely   • Drug use: No   • Sexual activity: None   Other Topics Concern   • None   Social History Narrative   • None     Social Determinants of Health     Financial Resource Strain: Not on file   Food Insecurity: Not on file   Transportation Needs: Not on file   Physical Activity: Not on file   Stress: Not on file   Social Connections: Not on file   Intimate Partner Violence: Not on file   Housing Stability: Not on file        Review of Systems   Constitutional: Negative for chills and fever  HENT: Negative for ear pain and sore throat  Eyes: Negative for pain and visual disturbance  Respiratory: Negative for cough and shortness of breath  Cardiovascular: Negative for chest pain and palpitations  Gastrointestinal: Negative for abdominal pain and vomiting  Genitourinary: Negative for dysuria and hematuria  Musculoskeletal: Negative for arthralgias and back pain  Skin: Negative for color change and rash  Neurological: Negative for seizures and syncope  Psychiatric/Behavioral: Negative  All other systems reviewed and are negative  Objective:      /100 (BP Location: Right arm, Patient Position: Sitting, Cuff Size: Standard)   Pulse 86   Ht 5' 6" (1 676 m)   Wt 78 5 kg (173 lb)   SpO2 100%   BMI 27 92 kg/m²          Physical Exam  Constitutional:       Appearance: Normal appearance  HENT:      Head: Normocephalic and atraumatic  Nose: Nose normal    Cardiovascular:      Pulses:           Dorsalis pedis pulses are 2+ on the left side  Posterior tibial pulses are 2+ on the left side  Pulmonary:      Effort: Pulmonary effort is normal    Feet:      Comments: There is mild tenderness to palpation to the patient's left third metatarsophalangeal joint without erythema nor edema; there is mild tenderness with range of motion of the third metatarsophalangeal joint without crepitus; clinical examination is consistent with capsulitis of the left third metatarsophalangeal joint  Skin:     General: Skin is warm  Capillary Refill: Capillary refill takes less than 2 seconds  Neurological:      General: No focal deficit present  Mental Status: She is alert and oriented to person, place, and time  Psychiatric:         Mood and Affect: Mood normal          Behavior: Behavior normal          Thought Content: Thought content normal            Small joint arthrocentesis: L third MTP  Universal Protocol:  Consent: Verbal consent obtained    Risks and benefits: risks, benefits and alternatives were discussed  Consent given by: patient  Time out: Immediately prior to procedure a "time out" was called to verify the correct patient, procedure, equipment, support staff and site/side marked as required  Timeout called at: 12/14/2022 2:45 PM   Patient understanding: patient states understanding of the procedure being performed  Patient consent: the patient's understanding of the procedure matches consent given  Patient identity confirmed: verbally with patient and provided demographic data    Supporting Documentation  Indications: pain   Procedure Details  Location: third toe - L third MTP  Needle size: 25 G  Ultrasound guidance: no  Approach: dorsal  Medications administered: 0 5 mL bupivacaine 0 25 %    Aspirate amount: 0 mL    Patient tolerance: patient tolerated the procedure well with no immediate complications  Dressing:  Sterile dressing applied    - After prepping the skin over the left third metatarsophalangeal joint with alcohol, the point of maximum tenderness was palpated and an injection was administered consisting of 0 5 mL of 0 25% bupivacaine plain, and 0 5 mL of Kenalog 40  A dry sterile dressing was applied  The procedure was tolerated well

## 2022-12-15 RX ORDER — BUPIVACAINE HYDROCHLORIDE 2.5 MG/ML
0.5 INJECTION, SOLUTION INFILTRATION; PERINEURAL
Status: SHIPPED | OUTPATIENT
Start: 2022-12-14

## 2023-04-03 ENCOUNTER — APPOINTMENT (OUTPATIENT)
Dept: LAB | Facility: CLINIC | Age: 78
End: 2023-04-03

## 2023-04-03 DIAGNOSIS — R73.01 IMPAIRED FASTING GLUCOSE: ICD-10-CM

## 2023-04-03 DIAGNOSIS — D64.9 ANEMIA, UNSPECIFIED TYPE: ICD-10-CM

## 2023-04-03 DIAGNOSIS — E78.2 MIXED HYPERLIPIDEMIA: ICD-10-CM

## 2023-04-03 LAB
ALBUMIN SERPL BCP-MCNC: 3.6 G/DL (ref 3.5–5)
ALP SERPL-CCNC: 55 U/L (ref 46–116)
ALT SERPL W P-5'-P-CCNC: 34 U/L (ref 12–78)
ANION GAP SERPL CALCULATED.3IONS-SCNC: 3 MMOL/L (ref 4–13)
AST SERPL W P-5'-P-CCNC: 22 U/L (ref 5–45)
BASOPHILS # BLD AUTO: 0.08 THOUSANDS/ÂΜL (ref 0–0.1)
BASOPHILS NFR BLD AUTO: 1 % (ref 0–1)
BILIRUB SERPL-MCNC: 0.46 MG/DL (ref 0.2–1)
BUN SERPL-MCNC: 20 MG/DL (ref 5–25)
CALCIUM SERPL-MCNC: 8.9 MG/DL (ref 8.3–10.1)
CHLORIDE SERPL-SCNC: 109 MMOL/L (ref 96–108)
CHOLEST SERPL-MCNC: 172 MG/DL
CO2 SERPL-SCNC: 28 MMOL/L (ref 21–32)
CREAT SERPL-MCNC: 0.74 MG/DL (ref 0.6–1.3)
EOSINOPHIL # BLD AUTO: 0.23 THOUSAND/ÂΜL (ref 0–0.61)
EOSINOPHIL NFR BLD AUTO: 4 % (ref 0–6)
ERYTHROCYTE [DISTWIDTH] IN BLOOD BY AUTOMATED COUNT: 13.2 % (ref 11.6–15.1)
EST. AVERAGE GLUCOSE BLD GHB EST-MCNC: 105 MG/DL
GFR SERPL CREATININE-BSD FRML MDRD: 77 ML/MIN/1.73SQ M
GLUCOSE P FAST SERPL-MCNC: 87 MG/DL (ref 65–99)
HBA1C MFR BLD: 5.3 %
HCT VFR BLD AUTO: 47.2 % (ref 34.8–46.1)
HDLC SERPL-MCNC: 66 MG/DL
HGB BLD-MCNC: 14.5 G/DL (ref 11.5–15.4)
IMM GRANULOCYTES # BLD AUTO: 0.01 THOUSAND/UL (ref 0–0.2)
IMM GRANULOCYTES NFR BLD AUTO: 0 % (ref 0–2)
LDLC SERPL CALC-MCNC: 87 MG/DL (ref 0–100)
LYMPHOCYTES # BLD AUTO: 2.32 THOUSANDS/ÂΜL (ref 0.6–4.47)
LYMPHOCYTES NFR BLD AUTO: 38 % (ref 14–44)
MCH RBC QN AUTO: 28.8 PG (ref 26.8–34.3)
MCHC RBC AUTO-ENTMCNC: 30.7 G/DL (ref 31.4–37.4)
MCV RBC AUTO: 94 FL (ref 82–98)
MONOCYTES # BLD AUTO: 0.68 THOUSAND/ÂΜL (ref 0.17–1.22)
MONOCYTES NFR BLD AUTO: 11 % (ref 4–12)
NEUTROPHILS # BLD AUTO: 2.79 THOUSANDS/ÂΜL (ref 1.85–7.62)
NEUTS SEG NFR BLD AUTO: 46 % (ref 43–75)
NONHDLC SERPL-MCNC: 106 MG/DL
NRBC BLD AUTO-RTO: 0 /100 WBCS
PLATELET # BLD AUTO: 401 THOUSANDS/UL (ref 149–390)
PMV BLD AUTO: 10.4 FL (ref 8.9–12.7)
POTASSIUM SERPL-SCNC: 4.4 MMOL/L (ref 3.5–5.3)
PROT SERPL-MCNC: 6.9 G/DL (ref 6.4–8.4)
RBC # BLD AUTO: 5.04 MILLION/UL (ref 3.81–5.12)
SODIUM SERPL-SCNC: 140 MMOL/L (ref 135–147)
TRIGL SERPL-MCNC: 96 MG/DL
WBC # BLD AUTO: 6.11 THOUSAND/UL (ref 4.31–10.16)

## 2023-07-17 ENCOUNTER — APPOINTMENT (OUTPATIENT)
Dept: LAB | Facility: CLINIC | Age: 78
End: 2023-07-17
Payer: COMMERCIAL

## 2023-07-17 ENCOUNTER — TRANSCRIBE ORDERS (OUTPATIENT)
Dept: LAB | Facility: CLINIC | Age: 78
End: 2023-07-17

## 2023-07-17 DIAGNOSIS — D75.839 THROMBOCYTHEMIA: ICD-10-CM

## 2023-07-17 DIAGNOSIS — D75.839 THROMBOCYTHEMIA: Primary | ICD-10-CM

## 2023-07-17 LAB
BASOPHILS # BLD AUTO: 0.07 THOUSANDS/ÂΜL (ref 0–0.1)
BASOPHILS NFR BLD AUTO: 1 % (ref 0–1)
EOSINOPHIL # BLD AUTO: 0.19 THOUSAND/ÂΜL (ref 0–0.61)
EOSINOPHIL NFR BLD AUTO: 2 % (ref 0–6)
ERYTHROCYTE [DISTWIDTH] IN BLOOD BY AUTOMATED COUNT: 13.6 % (ref 11.6–15.1)
HCT VFR BLD AUTO: 45.5 % (ref 34.8–46.1)
HGB BLD-MCNC: 14.1 G/DL (ref 11.5–15.4)
IMM GRANULOCYTES # BLD AUTO: 0.01 THOUSAND/UL (ref 0–0.2)
IMM GRANULOCYTES NFR BLD AUTO: 0 % (ref 0–2)
LYMPHOCYTES # BLD AUTO: 3.07 THOUSANDS/ÂΜL (ref 0.6–4.47)
LYMPHOCYTES NFR BLD AUTO: 35 % (ref 14–44)
MCH RBC QN AUTO: 29 PG (ref 26.8–34.3)
MCHC RBC AUTO-ENTMCNC: 31 G/DL (ref 31.4–37.4)
MCV RBC AUTO: 93 FL (ref 82–98)
MONOCYTES # BLD AUTO: 0.87 THOUSAND/ÂΜL (ref 0.17–1.22)
MONOCYTES NFR BLD AUTO: 10 % (ref 4–12)
NEUTROPHILS # BLD AUTO: 4.6 THOUSANDS/ÂΜL (ref 1.85–7.62)
NEUTS SEG NFR BLD AUTO: 52 % (ref 43–75)
NRBC BLD AUTO-RTO: 0 /100 WBCS
PLATELET # BLD AUTO: 375 THOUSANDS/UL (ref 149–390)
PMV BLD AUTO: 10.6 FL (ref 8.9–12.7)
RBC # BLD AUTO: 4.87 MILLION/UL (ref 3.81–5.12)
WBC # BLD AUTO: 8.81 THOUSAND/UL (ref 4.31–10.16)

## 2023-07-17 PROCEDURE — 36415 COLL VENOUS BLD VENIPUNCTURE: CPT

## 2023-07-17 PROCEDURE — 85025 COMPLETE CBC W/AUTO DIFF WBC: CPT

## 2023-08-04 ENCOUNTER — APPOINTMENT (EMERGENCY)
Dept: RADIOLOGY | Facility: HOSPITAL | Age: 78
End: 2023-08-04
Payer: COMMERCIAL

## 2023-08-04 ENCOUNTER — HOSPITAL ENCOUNTER (EMERGENCY)
Facility: HOSPITAL | Age: 78
Discharge: HOME | End: 2023-08-04
Attending: EMERGENCY MEDICINE
Payer: COMMERCIAL

## 2023-08-04 VITALS
SYSTOLIC BLOOD PRESSURE: 182 MMHG | HEART RATE: 74 BPM | RESPIRATION RATE: 16 BRPM | DIASTOLIC BLOOD PRESSURE: 94 MMHG | OXYGEN SATURATION: 98 % | TEMPERATURE: 98.3 F

## 2023-08-04 DIAGNOSIS — M19.90 OSTEOARTHRITIS: Primary | ICD-10-CM

## 2023-08-04 DIAGNOSIS — G56.00 CARPAL TUNNEL SYNDROME: ICD-10-CM

## 2023-08-04 PROCEDURE — 99283 EMERGENCY DEPT VISIT LOW MDM: CPT

## 2023-08-04 PROCEDURE — 73130 X-RAY EXAM OF HAND: CPT

## 2023-08-04 RX ORDER — PREDNISONE 20 MG/1
20 TABLET ORAL DAILY
Qty: 5 TABLET | Refills: 0 | Status: SHIPPED | OUTPATIENT
Start: 2023-08-04 | End: 2023-08-09

## 2023-08-04 RX ORDER — NAPROXEN 375 MG/1
375 TABLET ORAL 2 TIMES DAILY WITH MEALS
Qty: 20 TABLET | Refills: 0 | Status: SHIPPED | OUTPATIENT
Start: 2023-08-04 | End: 2023-08-04 | Stop reason: CLARIF

## 2023-08-04 RX ORDER — IBUPROFEN 400 MG/1
400 TABLET ORAL ONCE
Status: COMPLETED | OUTPATIENT
Start: 2023-08-04 | End: 2023-08-04

## 2023-08-04 RX ADMIN — IBUPROFEN 400 MG: 400 TABLET, FILM COATED ORAL at 18:24

## 2023-08-08 NOTE — ED PROVIDER NOTES
History  Chief Complaint   Patient presents with   • Hand Injury     Pt reports she was working outside Monday and sicne Monday night r hand has limited movement and is painful     69-year-old female presenting today with concerns of right hand pain after working outside on Monday. Patient states she is never had any pain like this before. Patient denies any nausea, vomiting, injuries to the area. States that she does have a history of osteoarthritis in the area that is painful. No fatigue, chest pain, shortness of breath, or any other symptoms at this time. No rashes or overlying the area of tenderness. Prior to Admission Medications   Prescriptions Last Dose Informant Patient Reported? Taking? Elastic Bandages & Supports (MEDICAL COMPRESSION STOCKINGS) MISC  Self No No   Sig: by Does not apply route daily Knee High 20-30mmHg   Multiple Vitamins-Minerals (MULTIVITAMIN ADULT PO)  Self Yes No   Sig: Take 1 tablet by mouth   acetaminophen (TYLENOL) 500 mg tablet  Self Yes No   Sig: Take 500 mg by mouth every 6 (six) hours   aspirin 81 MG tablet  Self Yes No   Sig: Take 81 mg by mouth   atorvastatin (LIPITOR) 10 mg tablet  Self Yes No   omeprazole (PriLOSEC) 20 mg delayed release capsule  Self Yes No   Sig: Take 20 mg by mouth 2 (two) times a day      Facility-Administered Medications Last Administration Doses Remaining   bupivacaine (MARCAINE) 0.25 % injection 0.5 mL 12/14/2022  3:59 PM           History reviewed. No pertinent past medical history.     Past Surgical History:   Procedure Laterality Date   • BACK SURGERY     • BREAST SURGERY     • FOOT SURGERY     • HIP SURGERY Left    • KNEE SURGERY Left        Family History   Problem Relation Age of Onset   • Heart disease Mother    • Cancer Mother    • Varicose Veins Mother    • Heart disease Father    • Heart disease Sister    • Cancer Sister    • Cancer Brother    • Varicose Veins Maternal Grandmother      I have reviewed and agree with the history as documented. E-Cigarette/Vaping   • E-Cigarette Use Never User      E-Cigarette/Vaping Substances     Social History     Tobacco Use   • Smoking status: Never   • Smokeless tobacco: Never   Vaping Use   • Vaping Use: Never used   Substance Use Topics   • Alcohol use: Yes     Comment: rarely   • Drug use: No       Review of Systems   Constitutional: Negative for chills and fever. HENT: Negative for ear pain and sore throat. Eyes: Negative for pain and visual disturbance. Respiratory: Negative for cough and shortness of breath. Cardiovascular: Negative for chest pain and palpitations. Gastrointestinal: Negative for abdominal pain and vomiting. Genitourinary: Negative for dysuria and hematuria. Musculoskeletal: Positive for arthralgias. Negative for back pain, gait problem, joint swelling, myalgias, neck pain and neck stiffness. Skin: Negative for color change and rash. Neurological: Negative for seizures and syncope. All other systems reviewed and are negative. Physical Exam  Physical Exam  Vitals and nursing note reviewed. Constitutional:       General: She is not in acute distress. Appearance: She is well-developed. HENT:      Head: Normocephalic and atraumatic. Eyes:      Conjunctiva/sclera: Conjunctivae normal.   Cardiovascular:      Rate and Rhythm: Normal rate and regular rhythm. Heart sounds: No murmur heard. Pulmonary:      Effort: Pulmonary effort is normal. No respiratory distress. Breath sounds: Normal breath sounds. Abdominal:      Palpations: Abdomen is soft. Tenderness: There is no abdominal tenderness. Musculoskeletal:         General: Tenderness (right base of thumb) present. No swelling, deformity or signs of injury. Cervical back: Neck supple. Right lower leg: No edema. Left lower leg: No edema. Skin:     General: Skin is warm and dry. Capillary Refill: Capillary refill takes less than 2 seconds.    Neurological: Mental Status: She is alert. Psychiatric:         Mood and Affect: Mood normal.         Vital Signs  ED Triage Vitals [08/04/23 1745]   Temperature Pulse Respirations Blood Pressure SpO2   98.3 °F (36.8 °C) 74 16 (!) 182/94 98 %      Temp Source Heart Rate Source Patient Position - Orthostatic VS BP Location FiO2 (%)   Oral Monitor Sitting Right arm --      Pain Score       --           Vitals:    08/04/23 1745   BP: (!) 182/94   Pulse: 74   Patient Position - Orthostatic VS: Sitting         Visual Acuity      ED Medications  Medications   ibuprofen (MOTRIN) tablet 400 mg (400 mg Oral Given 8/4/23 1824)       Diagnostic Studies  Results Reviewed     None                 XR hand 3+ views RIGHT   Final Result by Hinton Cooks, MD (08/04 1831)      No acute osseous abnormality. Degenerative changes as described. Workstation performed: [id number]                    Procedures  Procedures         ED Course                                             Medical Decision Making  53-year-old female presenting today with concerns of right thumb pain, base. No traumas that patient knows of but it started hurting after she was working in the garden on Monday. Will x-ray rule out fracture. Motrin for pain. Pain improved after Motrin. X-ray showing extensive degenerative process in the right thumb area. No fracture dislocations. The patient follow-up with orthopedic surgery for further evaluation and treatment. Will start patient on prednisone, 5-day burst.  Patient was agreeable to plan. Patient at time of discharge well-appearing in no acute distress, questions answered. Patient's vitals, lab/imaging results, diagnosis, and treatment plan were discussed with the patient. All new/changed medications were discussed with patient, specifically, route of administration, how often and when to take, and where they can be picked up.  Strict return precautions as well as close follow up with PCP was discussed with the patient and the patient was agreeable to my recommendations. Patient verbally acknowledged understanding of the above communications. All labs reviewed and utilized in the medical decision making process (if labs were ordered). Portions of the record may have been created with voice recognition software.  Occasional wrong word or "sound a like" substitutions may have occurred due to the inherent limitations of voice recognition software.  Read the chart carefully and recognize, using context, where substitutions have occurred. Amount and/or Complexity of Data Reviewed  Radiology: ordered. Risk  Prescription drug management. Disposition  Final diagnoses:   Osteoarthritis   Carpal tunnel syndrome     Time reflects when diagnosis was documented in both MDM as applicable and the Disposition within this note     Time User Action Codes Description Comment    8/4/2023  6:33 PM Boy Dewey Add [M19.90] Osteoarthritis     8/4/2023  6:33 PM Boy Dewey Add [G56.00] Carpal tunnel syndrome       ED Disposition     ED Disposition   Discharge    Condition   Stable    Date/Time   Fri Aug 4, 2023  6:33 PM    Comment   Bryan Hermosillo discharge to home/self care.                Follow-up Information     Follow up With Specialties Details Why Contact Info Additional Information    [address] Emergency Department Emergency Medicine Go to  If symptoms worsen [address] [facility] Emergency Department, [facility] Jose Luis Amaya, [address] [place] [address] Road,[address] Floor, [address] [facility] Jose Luis Amaya Orthopedic Surgery Schedule an appointment as soon as possible for a visit   [address] [place] [id number] [phone] [address] [place] [address],[address] Specialists Jose Luis Amaya, [address] [place] [address], [address] [place] [address], [address] [facility]          Discharge Medication List as of 8/4/2023  6:40 PM      START taking these medications    Details   predniSONE 20 mg tablet Take 1 tablet (20 mg total) by mouth daily for 5 days, Starting Fri 8/4/2023, Until Wed 8/9/2023, Normal         CONTINUE these medications which have NOT CHANGED    Details   acetaminophen (TYLENOL) 500 mg tablet Take 500 mg by mouth every 6 (six) hours, Historical Med      aspirin 81 MG tablet Take 81 mg by mouth, Starting Thu 4/13/2017, Historical Med      atorvastatin (LIPITOR) 10 mg tablet Starting Thu 10/25/2018, Historical Med      Elastic Bandages & Supports ([address]) MISC by Does not apply route daily Knee High 20-30mmHg, Starting Wed 11/14/2018, Print      Multiple Vitamins-Minerals (MULTIVITAMIN ADULT PO) Take 1 tablet by mouth, Historical Med      omeprazole (PriLOSEC) 20 mg delayed release capsule Take 20 mg by mouth 2 (two) times a day, Starting Thu 11/17/2022, Historical Med                 PDMP Review     None          ED Provider  Electronically Signed by           Vidya Wren PA-C  08/07/23 4621

## 2023-08-14 ENCOUNTER — OFFICE VISIT (OUTPATIENT)
Dept: OBGYN CLINIC | Facility: OTHER | Age: 78
End: 2023-08-14
Payer: COMMERCIAL

## 2023-08-14 VITALS
HEART RATE: 71 BPM | SYSTOLIC BLOOD PRESSURE: 151 MMHG | WEIGHT: 173 LBS | HEIGHT: 66 IN | DIASTOLIC BLOOD PRESSURE: 88 MMHG | BODY MASS INDEX: 27.8 KG/M2

## 2023-08-14 DIAGNOSIS — M79.641 RIGHT HAND PAIN: Primary | ICD-10-CM

## 2023-08-14 DIAGNOSIS — M19.031 LOCALIZED PRIMARY OSTEOARTHRITIS OF CARPOMETACARPAL (CMC) JOINT OF RIGHT WRIST: ICD-10-CM

## 2023-08-14 DIAGNOSIS — R20.2 NUMBNESS AND TINGLING IN RIGHT HAND: ICD-10-CM

## 2023-08-14 DIAGNOSIS — M19.041 PRIMARY OSTEOARTHRITIS OF RIGHT HAND: ICD-10-CM

## 2023-08-14 DIAGNOSIS — R20.0 NUMBNESS AND TINGLING IN RIGHT HAND: ICD-10-CM

## 2023-08-14 PROCEDURE — 20600 DRAIN/INJ JOINT/BURSA W/O US: CPT | Performed by: PHYSICIAN ASSISTANT

## 2023-08-14 PROCEDURE — 99203 OFFICE O/P NEW LOW 30 MIN: CPT | Performed by: PHYSICIAN ASSISTANT

## 2023-08-14 RX ORDER — LIDOCAINE HYDROCHLORIDE 10 MG/ML
0.5 INJECTION, SOLUTION INFILTRATION; PERINEURAL
Status: COMPLETED | OUTPATIENT
Start: 2023-08-14 | End: 2023-08-14

## 2023-08-14 RX ORDER — TRIAMCINOLONE ACETONIDE 40 MG/ML
40 INJECTION, SUSPENSION INTRA-ARTICULAR; INTRAMUSCULAR
Status: COMPLETED | OUTPATIENT
Start: 2023-08-14 | End: 2023-08-14

## 2023-08-14 RX ADMIN — LIDOCAINE HYDROCHLORIDE 0.5 ML: 10 INJECTION, SOLUTION INFILTRATION; PERINEURAL at 09:00

## 2023-08-14 RX ADMIN — TRIAMCINOLONE ACETONIDE 40 MG: 40 INJECTION, SUSPENSION INTRA-ARTICULAR; INTRAMUSCULAR at 09:00

## 2023-08-14 NOTE — PROGRESS NOTES
Orthopaedic Surgery - Office Note  Keshia Delgado (15 y.o. 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 She has extensive degenerative changes in the right hand. These were likely flared with her increased activity on 2023. The advanced degenerative changes of the [facility] joint were reviewed. The risks and benefits of a cortisone injection for acute symptomatic relief were reviewed at length today in the office. Patient wished to proceed with the injection as she has had successful ones in the remote past.  She tolerated the injection well and had significant relief of her pain symptoms prior to leaving the office. Patient may ice the hand and wrist 20 minutes on 1 hour off 3 times a day. The benefits of a thumb spica brace that may be worn at night. This will also help her carpal tunnel syndrome symptoms. The benefits of occupational therapy evaluation and treatment were reviewed with the patient in the office today. Therapy referral was offered.     The carpal tunnel syndrome diagnosis was reviewed with the patient in the office today. Initial treatment recommendations would consist of nighttime carpal tunnel splinting. Oral anti-inflammatory such as Aleve 1 tablet twice daily with food stopping and calling if any stomach upset occurs. Patient will be provided a physician directed home exercise program for carpal tunnel syndrome and will be available in the after visit summary. In addition patient will be sent for an ultrasound of the wrist to confirm the diagnosis. Patient will follow-up with a hand surgeon to discuss the success/failure of the conservative treatments as well as the ultrasound results. All question concerns were answered in the office today. Return for Recheck with hand surgeon to discuss u/s for CTS. Patient was advised she may call to have a repeat [address] [place] [address] Dr joint injection as needed as long as it is been approximately 4 months since the last injection. History of Present Illness  This is a new patient with right hand pain. The symptoms started after working outside in the yard on 7/31/2023. No specific trauma was reported. The symptoms became severe enough she went to the emergency department on 8/4/2023 and had x-rays taken of the right hand showing extensive degenerative changes throughout the right hand without any fractures. Pain was primarily at the base of the thumb. She reports that the oral steroids the ER prescribed did help her MCP and IP joint symptoms but the pain at the base of the thumb has remained. Patient reports she has also had numbness and tingling symptoms in the thumb index and middle finger for years. She reports the symptoms have been progressively getting worse and she believes it is time to get them addressed. She would like a carpal tunnel syndrome work-up as she would consider surgery as a possible treatment. She has not had any treatment for the carpal tunnel.     Patient reports she had right CMC joint injections many years ago that did help. Review of Systems  Pertinent items are noted in HPI. All other systems were reviewed and are negative. Physical Exam  /88 (BP Location: Left arm, Patient Position: Sitting, Cuff Size: Standard)   Pulse 71   Ht 5' 6" (1.676 m)   Wt 78.5 kg (173 lb)   BMI 27.92 kg/m²   Cons: Appears well. No apparent distress. Psych: Alert. Oriented x3. Mood and affect normal.  Patient's right hand has no skin breakdown lesions or signs of infection. There is deformity at the [facility] joint with subluxation noted and tenderness to palpation appreciated. She has a positive CMC grind test.  There is mild thenar atrophy. Her  strength and pinch strength is decreased to 4 out of 5 in all digits. She has a positive Phalen's at approximately 5 seconds. She has a positive Tinel's at the carpal tunnel. She has a negative Tinel's at the cubital tunnel. She has limited right wrist range of motion. She is nontender at her MCP and IP joints today in the office. Distal radial and ulnar pulses are +2. Studies Reviewed  Study Result    Narrative & Impression   RIGHT HAND     INDICATION:   pain, reduced ROM. Right hand pain with limited range of motion.     COMPARISON:  None     VIEWS:  XR HAND 3+ VW RIGHT        For the purposes of institution wide universal language the following terms will apply: (thumb=1st digit/finger, index finger=2nd digit/finger, long finger=3rd digit/finger, ring=4th digit/finger and small finger=5th digit/finger)     FINDINGS:     There is no acute fracture or dislocation.     Osteoarthritis of the DIP and PIP joints.  Advanced degenerative changes of the first carpometacarpal joint.     No lytic or blastic osseous lesion.     Soft tissues are unremarkable.     IMPRESSION:     No acute osseous abnormality.     Degenerative changes as described.           Workstation performed: [id number]     X-ray images as well as reports were reviewed by myself in the office today and I agree with radiologist interpretation. Emergency department notes from 8/4/2023 were reviewed by myself in the office today. Small joint arthrocentesis: R thumb CMC  [place] Protocol:  Consent: Verbal consent obtained. Risks and benefits: risks, benefits and alternatives were discussed  Consent given by: patient  Time out: Immediately prior to procedure a "time out" was called to verify the correct patient, procedure, equipment, support staff and site/side marked as required. Patient understanding: patient states understanding of the procedure being performed  Patient consent: the patient's understanding of the procedure matches consent given  Relevant documents: relevant documents present and verified  Test results: test results available and properly labeled  Site marked: the operative site was marked  Radiology Images displayed and confirmed. If images not available, report reviewed: imaging studies available  Patient identity confirmed: verbally with patient    Supporting Documentation  Indications: pain   Procedure Details  Location: thumb - R thumb CMC  Needle size: 22 G  Ultrasound guidance: no  Medications administered: 0.5 mL lidocaine 1 %; 40 mg triamcinolone acetonide 40 mg/mL    Patient tolerance: patient tolerated the procedure well with no immediate complications  Dressing:  Sterile dressing applied    patient had significant relief before leaving office today        Medical, Surgical, Family, and Social History  The patient's medical history, family history, and social history, were reviewed and updated as appropriate. History reviewed. No pertinent past medical history.     Past Surgical History:   Procedure Laterality Date   • BACK SURGERY     • BREAST SURGERY     • FOOT SURGERY     • HIP SURGERY Left    • KNEE SURGERY Left        Family History   Problem Relation Age of Onset   • Heart disease Mother    • Cancer Mother    • Varicose Veins Mother    • Heart disease Father • Heart disease Sister    • Cancer Sister    • Cancer Brother    • Varicose Veins Maternal Grandmother        Social History     Occupational History   • Not on file   Tobacco Use   • Smoking status: Never   • Smokeless tobacco: Never   Vaping Use   • Vaping Use: Never used   Substance and Sexual Activity   • Alcohol use: Yes     Comment: rarely   • Drug use: No   • Sexual activity: Not on file       Allergies   Allergen Reactions   • Tramadol GI Intolerance         Current Outpatient Medications:   •  acetaminophen (TYLENOL) 500 mg tablet, Take 500 mg by mouth every 6 (six) hours, Disp: , Rfl:   •  aspirin 81 MG tablet, Take 81 mg by mouth, Disp: , Rfl:   •  atorvastatin (LIPITOR) 10 mg tablet, , Disp: , Rfl: 0  •  Elastic Bandages & Supports ([address] ) MIS, by Does not apply route daily Knee High 20-30mmHg, Disp: 2 each, Rfl: 4  •  Multiple Vitamins-Minerals (MULTIVITAMIN ADULT PO), Take 1 tablet by mouth, Disp: , Rfl:   •  omeprazole (PriLOSEC) 20 mg delayed release capsule, Take 20 mg by mouth 2 (two) times a day, Disp: , Rfl:     Current Facility-Administered Medications:   •  bupivacaine (MARCAINE) 0.25 % injection 0.5 mL, 0.5 mL, Injection, , Kristal Uribe DPM, 0.5 mL at 12/14/22 [address] [place], [place] [address]CARLOS

## 2023-08-14 NOTE — PATIENT INSTRUCTIONS
Carpal Tunnel Syndrome Exercises   WHAT YOU NEED TO KNOW:   What do I need to know about carpal tunnel syndrome (CTS) exercises? CTS exercises can help relieve pain and increase your range of motion. Your healthcare provider or physical therapist can tell you how often to do the exercises. How do I perform CTS exercises? Finger extensions:  Hold your fingers and thumb close together. Keep them straight. Put a rubber band around the outside of your fingers and thumb. Spread your fingers apart. Then slowly bring them together without letting the rubber band fall off. Repeat 40 times. Wrist flexor stretch:  Hold your arm out straight. Grasp your fingers with your other hand. Slowly bend the fingers back (palm facing away) until you feel a stretch in your wrist. Hold for 10 seconds. Repeat 5 times. Wrist extensor stretch:  Hold your arm out straight. Grasp your fingers with your other hand. Slowly bend the fingers and hand down (palm facing you) until you feel a stretch on top of your hand. Hold for 10 seconds. Repeat 5 times. Wrist curls without weights:  Sit in a chair with your forearm resting on your thigh or a table. With your palm facing down, flex your wrist up 3 inches and slowly lower it. Turn your forearm over and repeat with your palm facing up. Do each exercise 20 times. Shrugs:  Stand with your arms by your side. Lift your shoulders up to your ears and hold for 1 second. Then pull your shoulders back, pinching your shoulder blades together. Hold for 1 second. Then relax your shoulders. Repeat 20 times. CARE AGREEMENT:   You have the right to help plan your care. Learn about your health condition and how it may be treated. Discuss treatment options with your healthcare providers to decide what care you want to receive. You always have the right to refuse treatment. The above information is an  only.  It is not intended as medical advice for individual conditions or treatments. Talk to your doctor, nurse or pharmacist before following any medical regimen to see if it is safe and effective for you. © Copyright [employer] [facility] 2022 Information is for End User's use only and may not be sold, redistributed or otherwise used for commercial purposes.

## 2023-08-15 ENCOUNTER — TELEPHONE (OUTPATIENT)
Age: 78
End: 2023-08-15

## 2023-08-15 NOTE — TELEPHONE ENCOUNTER
Patient is being referred to a orthopedics. Please schedule accordingly.     [facility]   [phone]

## 2023-10-31 ENCOUNTER — HOSPITAL ENCOUNTER (OUTPATIENT)
Dept: ULTRASOUND IMAGING | Facility: HOSPITAL | Age: 78
Discharge: HOME | End: 2023-10-31
Payer: COMMERCIAL

## 2023-10-31 DIAGNOSIS — R20.0 NUMBNESS AND TINGLING IN RIGHT HAND: ICD-10-CM

## 2023-10-31 DIAGNOSIS — R20.2 NUMBNESS AND TINGLING IN RIGHT HAND: ICD-10-CM

## 2023-10-31 DIAGNOSIS — M79.641 RIGHT HAND PAIN: ICD-10-CM

## 2023-10-31 PROCEDURE — 76882 US LMTD JT/FCL EVL NVASC XTR: CPT

## 2023-11-07 ENCOUNTER — OFFICE VISIT (OUTPATIENT)
Dept: OBGYN CLINIC | Facility: CLINIC | Age: 78
End: 2023-11-07
Payer: COMMERCIAL

## 2023-11-07 VITALS
DIASTOLIC BLOOD PRESSURE: 75 MMHG | HEART RATE: 79 BPM | HEIGHT: 66 IN | WEIGHT: 173 LBS | BODY MASS INDEX: 27.8 KG/M2 | SYSTOLIC BLOOD PRESSURE: 126 MMHG

## 2023-11-07 DIAGNOSIS — R20.0 NUMBNESS AND TINGLING IN RIGHT HAND: ICD-10-CM

## 2023-11-07 DIAGNOSIS — M19.031 LOCALIZED PRIMARY OSTEOARTHRITIS OF CARPOMETACARPAL (CMC) JOINT OF RIGHT WRIST: ICD-10-CM

## 2023-11-07 DIAGNOSIS — R20.2 NUMBNESS AND TINGLING IN RIGHT HAND: ICD-10-CM

## 2023-11-07 DIAGNOSIS — M79.641 RIGHT HAND PAIN: ICD-10-CM

## 2023-11-07 DIAGNOSIS — M19.041 PRIMARY OSTEOARTHRITIS OF RIGHT HAND: ICD-10-CM

## 2023-11-07 PROCEDURE — 99214 OFFICE O/P EST MOD 30 MIN: CPT | Performed by: STUDENT IN AN ORGANIZED HEALTH CARE EDUCATION/TRAINING PROGRAM

## 2023-11-07 PROCEDURE — 20600 DRAIN/INJ JOINT/BURSA W/O US: CPT | Performed by: STUDENT IN AN ORGANIZED HEALTH CARE EDUCATION/TRAINING PROGRAM

## 2023-11-07 RX ORDER — BETAMETHASONE SODIUM PHOSPHATE AND BETAMETHASONE ACETATE 3; 3 MG/ML; MG/ML
6 INJECTION, SUSPENSION INTRA-ARTICULAR; INTRALESIONAL; INTRAMUSCULAR; SOFT TISSUE
Status: COMPLETED | OUTPATIENT
Start: 2023-11-07 | End: 2023-11-07

## 2023-11-07 RX ORDER — BUPIVACAINE HYDROCHLORIDE 2.5 MG/ML
1 INJECTION, SOLUTION EPIDURAL; INFILTRATION; INTRACAUDAL
Status: COMPLETED | OUTPATIENT
Start: 2023-11-07 | End: 2023-11-07

## 2023-11-07 RX ADMIN — BETAMETHASONE SODIUM PHOSPHATE AND BETAMETHASONE ACETATE 6 MG: 3; 3 INJECTION, SUSPENSION INTRA-ARTICULAR; INTRALESIONAL; INTRAMUSCULAR; SOFT TISSUE at 09:00

## 2023-11-07 RX ADMIN — BUPIVACAINE HYDROCHLORIDE 1 ML: 2.5 INJECTION, SOLUTION EPIDURAL; INFILTRATION; INTRACAUDAL at 09:00

## 2023-11-07 NOTE — PROGRESS NOTES
ORTHOPAEDIC HAND, WRIST, AND ELBOW OFFICE  VISIT       ASSESSMENT/PLAN:      Diagnoses and all orders for this visit:    Right hand pain  -     Ambulatory Referral to Hand Surgery    Primary osteoarthritis of right hand-Osteoarthritis of the DIP and PIP joints  -     Ambulatory Referral to Hand Surgery  -     Small joint arthrocentesis: R thumb CMC  -     bupivacaine (PF) (MARCAINE) 0.25 % injection 1 mL  -     betamethasone acetate-betamethasone sodium phosphate (CELESTONE) injection 6 mg    Localized primary osteoarthritis of carpometacarpal (CMC) joint of right wrist  -     Ambulatory Referral to Hand Surgery    Numbness and tingling in right hand  -     Ambulatory Referral to Hand Surgery          77 y.o. female with right hand arthritis, particularly thumb   CMC joint. XR and US results d/w pt today. Treatment options and expected outcomes were discussed. The patient verbalized understanding of exam findings and treatment plan. The patient was given the opportunity to ask questions. Questions were answered to the patient's satisfaction. The patient decided to move forward with steroid injx to the right thumb CMC via shared decision making. This was provided today and pt tolerated the procedure well. Follow Up:  3 months       To Do Next Visit:  Re-evaluation of current issue      Discussions:  Thumb CMC Arthritis: The anatomy and physiology of carpometacarpal joint arthritis was discussed with the patient today in the office. Deterioration of the articular cartilage eventually leads to hypermobility at the thumb [facility] joint, resulting in joint subluxation, osteophyte formation, cystic changes within the trapezium and base of the first metacarpal, as well as subchondral sclerosis. Eventually, pain, limited mobility, and compensatory hyperextension at the metacarpophalangeal joint may develop.   While normal activity and usage of the thumb joint may provide a painful experience to the patient, this typically does not result in damage to the thumb or hand. Treatment options include resting thumb spica splints to decreased joint edema, pain, and inflammation. Therapy exercises to strengthen the thenar musculature may relieve pain, but do not alter the overall continued development of osteoarthritis. Oral medications, topical medications, corticosteroid injections may decrease pain and increase overall function. Eventually, approximately 5% of patients may require surgical intervention. Christal Taylor MD  Attending, Orthopaedic Surgery  Hand, Wrist, and Elbow Surgery  [facility]    ____________________________________________________________________________________________________________________________________________      CHIEF COMPLAINT:  Chief Complaint   Patient presents with   • Right Thumb - Pain       SUBJECTIVE:  Shahid Gallego is a 77y.o. year old  female who presents today at the request of Georgette Cabrera PA-C for evaluation and treatment of right hand pain. Pt states she woke up a few months ago and had difficulty moving her fingers. Describes pain as being worse along the thumb, but has some discomfort along the other knuckles as well. Pt had a steroid injection back in August and states this did help, but it has started to wear off more recently. She states she has had previous injxs here as well which have helped. Describes some slight tingling and tightness in all the fingers in the AM, but no numbness. I have personally reviewed all the relevant PMH, PSH, SH, FH, Medications and allergies      PAST MEDICAL HISTORY:  History reviewed. No pertinent past medical history.     PAST SURGICAL HISTORY:  Past Surgical History:   Procedure Laterality Date   • BACK SURGERY     • BREAST SURGERY     • FOOT SURGERY     • HIP SURGERY Left    • KNEE SURGERY Left        FAMILY HISTORY:  Family History   Problem Relation Age of Onset   • Heart disease Mother    • Cancer Mother    • Varicose Veins Mother    • Heart disease Father    • Heart disease Sister    • Cancer Sister    • Cancer Brother    • Varicose Veins Maternal Grandmother        SOCIAL HISTORY:  Social History     Tobacco Use   • Smoking status: Never   • Smokeless tobacco: Never   Vaping Use   • Vaping Use: Never used   Substance Use Topics   • Alcohol use: Yes     Comment: rarely   • Drug use: No       MEDICATIONS:    Current Outpatient Medications:   •  acetaminophen (TYLENOL) 500 mg tablet, Take 500 mg by mouth every 6 (six) hours, Disp: , Rfl:   •  aspirin 81 MG tablet, Take 81 mg by mouth, Disp: , Rfl:   •  atorvastatin (LIPITOR) 10 mg tablet, , Disp: , Rfl: 0  •  Multiple Vitamins-Minerals (MULTIVITAMIN ADULT PO), Take 1 tablet by mouth, Disp: , Rfl:   •  omeprazole (PriLOSEC) 20 mg delayed release capsule, Take 20 mg by mouth 2 (two) times a day, Disp: , Rfl:   •  Elastic Bandages & Supports ([address]) MISC, by Does not apply route daily Knee High 20-30mmHg, Disp: 2 each, Rfl: 4    Current Facility-Administered Medications:   •  bupivacaine (MARCAINE) 0.25 % injection 0.5 mL, 0.5 mL, Injection, , Ely Cobble Uribe, DPM, 0.5 mL at 12/14/22 1559    ALLERGIES:  Allergies   Allergen Reactions   • Tramadol GI Intolerance           REVIEW OF SYSTEMS:  Musculoskeletal:        As noted in HPI. All other systems reviewed and are negative.     VITALS:  Vitals:    11/07/23 0852   BP: 126/75   Pulse: 79       LABS:  HgA1c:   Lab Results   Component Value Date    HGBA1C 5.3 04/03/2023     BMP:   Lab Results   Component Value Date    CALCIUM 8.9 04/03/2023    K 4.4 04/03/2023    CO2 28 04/03/2023     (H) 04/03/2023    BUN 20 04/03/2023    CREATININE 0.74 04/03/2023       _____________________________________________________  PHYSICAL EXAMINATION:  General: well developed and well nourished, alert, oriented times 3, and appears comfortable  Psychiatric: Normal  HEENT: Normocephalic, Atraumatic Trachea Midline, No torticollis  Pulmonary: No audible wheezing or respiratory distress   Abdomen/GI: Non tender, non distended   Cardiovascular: No pitting edema, 2+ radial pulse   Skin: No masses, erythema, lacerations, fluctation, ulcerations  Neurovascular: Sensation Intact to the Median, Ulnar, Radial Nerve, Motor Intact to the Median, Ulnar, Radial Nerve, and Pulses Intact  Musculoskeletal: Normal, except as noted in detailed exam and in HPI. MUSCULOSKELETAL EXAMINATION:  RUE:  AIN 5/5  APB 5/5  Instrinsics 5/5  + shoulder sign. + ttp thumb CMC. + grind. Negative Tinel's carpal tunnel. Brisk capillary refill.     ___________________________________________________  STUDIES REVIEWED:  Xrays of the right hand were reviewed and independently interpreted in PACS by Dr. Diana Reed and demonstrate severe degenerative changes about the thumb CMC joint. Pt also noted to have degenerative changes throughout all the joints of other fingers. US report also reviewed which shows no evidence of carpal tunnel syndrome on the right        PROCEDURES PERFORMED:  Small joint arthrocentesis: R thumb CMC  [place] Protocol:  Consent: Verbal consent obtained.   Risks and benefits: risks, benefits and alternatives were discussed  Consent given by: patient  Patient understanding: patient states understanding of the procedure being performed  Patient identity confirmed: verbally with patient  Supporting Documentation  Indications: pain   Procedure Details  Location: thumb - R thumb CMC  Needle size: 25 G  Ultrasound guidance: no  Approach: volar  Medications administered: 6 mg betamethasone acetate-betamethasone sodium phosphate 6 (3-3) mg/mL; 1 mL bupivacaine (PF) 0.25 %    Patient tolerance: patient tolerated the procedure well with no immediate complications  Dressing:  Sterile dressing applied             _____________________________________________________      Scribe Attestation    I,:  Juna Bamberger, PA-C am acting as a scribe while in the presence of the attending physician.:       I,:  Shelia Peters MD personally performed the services described in this documentation    as scribed in my presence.:

## 2024-01-02 ENCOUNTER — TRANSCRIBE ORDERS (OUTPATIENT)
Dept: LAB | Facility: CLINIC | Age: 79
End: 2024-01-02

## 2024-01-02 ENCOUNTER — APPOINTMENT (OUTPATIENT)
Dept: LAB | Facility: CLINIC | Age: 79
End: 2024-01-02
Payer: COMMERCIAL

## 2024-01-02 DIAGNOSIS — R73.01 IMPAIRED FASTING GLUCOSE: ICD-10-CM

## 2024-01-02 DIAGNOSIS — D75.839 THROMBOCYTHEMIA: ICD-10-CM

## 2024-01-02 DIAGNOSIS — E78.2 MIXED HYPERLIPIDEMIA: ICD-10-CM

## 2024-01-02 DIAGNOSIS — E78.2 MIXED HYPERLIPIDEMIA: Primary | ICD-10-CM

## 2024-01-02 LAB
ALBUMIN SERPL BCP-MCNC: 4.1 G/DL (ref 3.5–5)
ALP SERPL-CCNC: 45 U/L (ref 34–104)
ALT SERPL W P-5'-P-CCNC: 25 U/L (ref 7–52)
ANION GAP SERPL CALCULATED.3IONS-SCNC: 11 MMOL/L
AST SERPL W P-5'-P-CCNC: 25 U/L (ref 13–39)
BASOPHILS # BLD AUTO: 0.03 THOUSANDS/ÂΜL (ref 0–0.1)
BASOPHILS NFR BLD AUTO: 1 % (ref 0–1)
BILIRUB SERPL-MCNC: 0.48 MG/DL (ref 0.2–1)
BUN SERPL-MCNC: 17 MG/DL (ref 5–25)
CALCIUM SERPL-MCNC: 9.1 MG/DL (ref 8.4–10.2)
CHLORIDE SERPL-SCNC: 104 MMOL/L (ref 96–108)
CHOLEST SERPL-MCNC: 148 MG/DL
CO2 SERPL-SCNC: 27 MMOL/L (ref 21–32)
CREAT SERPL-MCNC: 0.8 MG/DL (ref 0.6–1.3)
EOSINOPHIL # BLD AUTO: 0.11 THOUSAND/ÂΜL (ref 0–0.61)
EOSINOPHIL NFR BLD AUTO: 2 % (ref 0–6)
ERYTHROCYTE [DISTWIDTH] IN BLOOD BY AUTOMATED COUNT: 12.9 % (ref 11.6–15.1)
EST. AVERAGE GLUCOSE BLD GHB EST-MCNC: 126 MG/DL
GFR SERPL CREATININE-BSD FRML MDRD: 70 ML/MIN/1.73SQ M
GLUCOSE P FAST SERPL-MCNC: 93 MG/DL (ref 65–99)
HBA1C MFR BLD: 6 %
HCT VFR BLD AUTO: 46.4 % (ref 34.8–46.1)
HDLC SERPL-MCNC: 64 MG/DL
HGB BLD-MCNC: 14.9 G/DL (ref 11.5–15.4)
IMM GRANULOCYTES # BLD AUTO: 0.02 THOUSAND/UL (ref 0–0.2)
IMM GRANULOCYTES NFR BLD AUTO: 0 % (ref 0–2)
LDLC SERPL CALC-MCNC: 66 MG/DL (ref 0–100)
LYMPHOCYTES # BLD AUTO: 2.67 THOUSANDS/ÂΜL (ref 0.6–4.47)
LYMPHOCYTES NFR BLD AUTO: 50 % (ref 14–44)
MCH RBC QN AUTO: 29.2 PG (ref 26.8–34.3)
MCHC RBC AUTO-ENTMCNC: 32.1 G/DL (ref 31.4–37.4)
MCV RBC AUTO: 91 FL (ref 82–98)
MONOCYTES # BLD AUTO: 0.66 THOUSAND/ÂΜL (ref 0.17–1.22)
MONOCYTES NFR BLD AUTO: 13 % (ref 4–12)
NEUTROPHILS # BLD AUTO: 1.81 THOUSANDS/ÂΜL (ref 1.85–7.62)
NEUTS SEG NFR BLD AUTO: 34 % (ref 43–75)
NONHDLC SERPL-MCNC: 84 MG/DL
NRBC BLD AUTO-RTO: 0 /100 WBCS
PLATELET # BLD AUTO: 320 THOUSANDS/UL (ref 149–390)
PMV BLD AUTO: 11.1 FL (ref 8.9–12.7)
POTASSIUM SERPL-SCNC: 4.2 MMOL/L (ref 3.5–5.3)
PROT SERPL-MCNC: 6.7 G/DL (ref 6.4–8.4)
RBC # BLD AUTO: 5.1 MILLION/UL (ref 3.81–5.12)
SODIUM SERPL-SCNC: 142 MMOL/L (ref 135–147)
TRIGL SERPL-MCNC: 90 MG/DL
WBC # BLD AUTO: 5.3 THOUSAND/UL (ref 4.31–10.16)

## 2024-01-02 PROCEDURE — 83036 HEMOGLOBIN GLYCOSYLATED A1C: CPT

## 2024-01-02 PROCEDURE — 85025 COMPLETE CBC W/AUTO DIFF WBC: CPT

## 2024-01-02 PROCEDURE — 80061 LIPID PANEL: CPT

## 2024-01-02 PROCEDURE — 80053 COMPREHEN METABOLIC PANEL: CPT

## 2024-01-02 PROCEDURE — 36415 COLL VENOUS BLD VENIPUNCTURE: CPT

## 2024-02-13 ENCOUNTER — APPOINTMENT (OUTPATIENT)
Dept: PHYSICAL THERAPY | Facility: CLINIC | Age: 79
End: 2024-02-13
Payer: COMMERCIAL

## 2024-02-14 ENCOUNTER — TRANSCRIBE ORDERS (OUTPATIENT)
Dept: LAB | Facility: CLINIC | Age: 79
End: 2024-02-14

## 2024-02-14 ENCOUNTER — APPOINTMENT (OUTPATIENT)
Dept: LAB | Facility: CLINIC | Age: 79
End: 2024-02-14
Payer: COMMERCIAL

## 2024-02-14 ENCOUNTER — EVALUATION (OUTPATIENT)
Dept: PHYSICAL THERAPY | Facility: CLINIC | Age: 79
End: 2024-02-14
Payer: COMMERCIAL

## 2024-02-14 DIAGNOSIS — Z01.818 OTHER SPECIFIED PRE-OPERATIVE EXAMINATION: ICD-10-CM

## 2024-02-14 DIAGNOSIS — M25.561 CHRONIC PAIN OF RIGHT KNEE: ICD-10-CM

## 2024-02-14 DIAGNOSIS — G89.29 CHRONIC PAIN OF RIGHT KNEE: ICD-10-CM

## 2024-02-14 DIAGNOSIS — Z01.89 LABORATORY PROCEDURE: ICD-10-CM

## 2024-02-14 DIAGNOSIS — Z01.89 LABORATORY PROCEDURE: Primary | ICD-10-CM

## 2024-02-14 DIAGNOSIS — M17.11 UNILATERAL PRIMARY OSTEOARTHRITIS, RIGHT KNEE: Primary | ICD-10-CM

## 2024-02-14 LAB
ALBUMIN SERPL BCP-MCNC: 4.1 G/DL (ref 3.5–5)
ALP SERPL-CCNC: 55 U/L (ref 34–104)
ALT SERPL W P-5'-P-CCNC: 23 U/L (ref 7–52)
ANION GAP SERPL CALCULATED.3IONS-SCNC: 8 MMOL/L
AST SERPL W P-5'-P-CCNC: 18 U/L (ref 13–39)
BASOPHILS # BLD AUTO: 0.08 THOUSANDS/ÂΜL (ref 0–0.1)
BASOPHILS NFR BLD AUTO: 1 % (ref 0–1)
BILIRUB SERPL-MCNC: 0.72 MG/DL (ref 0.2–1)
BUN SERPL-MCNC: 19 MG/DL (ref 5–25)
CALCIUM SERPL-MCNC: 9.3 MG/DL (ref 8.4–10.2)
CHLORIDE SERPL-SCNC: 104 MMOL/L (ref 96–108)
CO2 SERPL-SCNC: 30 MMOL/L (ref 21–32)
CREAT SERPL-MCNC: 0.82 MG/DL (ref 0.6–1.3)
EOSINOPHIL # BLD AUTO: 0.27 THOUSAND/ÂΜL (ref 0–0.61)
EOSINOPHIL NFR BLD AUTO: 4 % (ref 0–6)
ERYTHROCYTE [DISTWIDTH] IN BLOOD BY AUTOMATED COUNT: 13.3 % (ref 11.6–15.1)
GFR SERPL CREATININE-BSD FRML MDRD: 68 ML/MIN/1.73SQ M
GLUCOSE P FAST SERPL-MCNC: 99 MG/DL (ref 65–99)
HCT VFR BLD AUTO: 47.2 % (ref 34.8–46.1)
HGB BLD-MCNC: 14.7 G/DL (ref 11.5–15.4)
IMM GRANULOCYTES # BLD AUTO: 0.02 THOUSAND/UL (ref 0–0.2)
IMM GRANULOCYTES NFR BLD AUTO: 0 % (ref 0–2)
LYMPHOCYTES # BLD AUTO: 2.71 THOUSANDS/ÂΜL (ref 0.6–4.47)
LYMPHOCYTES NFR BLD AUTO: 39 % (ref 14–44)
MCH RBC QN AUTO: 28.4 PG (ref 26.8–34.3)
MCHC RBC AUTO-ENTMCNC: 31.1 G/DL (ref 31.4–37.4)
MCV RBC AUTO: 91 FL (ref 82–98)
MONOCYTES # BLD AUTO: 0.75 THOUSAND/ÂΜL (ref 0.17–1.22)
MONOCYTES NFR BLD AUTO: 11 % (ref 4–12)
NEUTROPHILS # BLD AUTO: 3.2 THOUSANDS/ÂΜL (ref 1.85–7.62)
NEUTS SEG NFR BLD AUTO: 45 % (ref 43–75)
NRBC BLD AUTO-RTO: 0 /100 WBCS
PLATELET # BLD AUTO: 365 THOUSANDS/UL (ref 149–390)
PMV BLD AUTO: 10.4 FL (ref 8.9–12.7)
POTASSIUM SERPL-SCNC: 4.3 MMOL/L (ref 3.5–5.3)
PROT SERPL-MCNC: 6.7 G/DL (ref 6.4–8.4)
RBC # BLD AUTO: 5.18 MILLION/UL (ref 3.81–5.12)
SODIUM SERPL-SCNC: 142 MMOL/L (ref 135–147)
WBC # BLD AUTO: 7.03 THOUSAND/UL (ref 4.31–10.16)

## 2024-02-14 PROCEDURE — 85025 COMPLETE CBC W/AUTO DIFF WBC: CPT

## 2024-02-14 PROCEDURE — 80053 COMPREHEN METABOLIC PANEL: CPT

## 2024-02-14 PROCEDURE — 97161 PT EVAL LOW COMPLEX 20 MIN: CPT

## 2024-02-14 PROCEDURE — 97112 NEUROMUSCULAR REEDUCATION: CPT

## 2024-02-14 PROCEDURE — 36415 COLL VENOUS BLD VENIPUNCTURE: CPT

## 2024-02-14 NOTE — PROGRESS NOTES
PT Evaluation     Today's date: 2024  Patient name: Stormy Hermosillo  : 1945  MRN: [id number]  Referring provider: Rodolfo Govea MD  Dx:   Encounter Diagnosis     ICD-10-CM    1. Unilateral primary osteoarthritis, right knee  M17.11       2. Chronic pain of right knee  M25.561     G89.29           Start Time: 0848  Stop Time: 0930  Total time in clinic (min): 42 minutes    Assessment  Assessment details: Stormy Hermosillo is a 78 y.o. female who presents with pain, decreased strength, decreased ROM, and decreased joint mobility. Due to these impairments, Patient has difficulty performing a/iadls, recreational activities, and engaging in social activities. Patient has signs and symptoms consistent with their referring diagnosis of Unilateral primary osteoarthritis, right knee  (primary encounter diagnosis). Chronic pain of right knee. Patient would benefit from skilled physical therapy to address the impairments, improve their level of function and to improve their overall quality of life.    Impairments: abnormal gait, abnormal or restricted ROM, activity intolerance, impaired physical strength, lacks appropriate home exercise program, pain with function and weight-bearing intolerance    Symptom irritability: lowUnderstanding of Dx/Px/POC: good   Prognosis: good    Goals  Short Term Goals: to be achieved by 4 weeks  1) Patient to be independent with basic HEP  2) Decrease pain to 3/10 at its worst  3) Increase R knee ROM by 5-10 degrees in all affected planes  4) Increase LE strength by 1/2 MMT grade in all affected planes    Long Term Goals: to be achieved by discharge  1) FOTO equal to or greater than projected goal.  2) Ambulation to improve to maximal level of function  3) Stair negotiation will improve to reciprocal  4) Sit to stand transfers will improve to maximal level of function      Plan  Plan details: 1x/week 2 weeks pre op, 2x/week 6 weeks post op  Patient would benefit from: skilled  "physical therapy and PT eval  Planned modality interventions: low level laser therapy  Planned therapy interventions: manual therapy, neuromuscular re-education, therapeutic activities, therapeutic exercise and home exercise program  Frequency: 1x week  Duration in visits: 2  Duration in weeks: 2  Treatment plan discussed with: patient        Subjective Evaluation    History of Present Illness  Date of onset: 2023  Date of surgery: scheduled for surgery on 3/12.  Mechanism of injury: History of Current Injury: Pt notes to have pain in the R knee for approximately 1 year. Noted to have gone to ortho who noted knee to be \"bone on bone\" thus an injection \"would not last.\" Noted to have not tried PT before for the R LE. Notes to have greater pain with prolonged ambulation including grocery shopping and standing for long periods. Notes knee to give out at times thus needs something to hold on to when ambulating far. Notes to not use any assistive device.    Surgery date: Scheduled for 3/12, R TKA  Pain location/Descriptors: R knee inferior medially, sharp  Aggravating factors: standing and ambulating  Easing factors: Tylenol  Imaging: pt noted to have received x-ray on R knee indicating bone on bone presentation however no x-ray noted in EPIC file.   Patient goals: get strong and back into routine  Special interest or hobbies: gardening, house work   Occupation: Retired      Quality of life: good    Patient Goals  Patient goals for therapy: decreased pain, increased motion, increased strength, independence with ADLs/IADLs and return to sport/leisure activities    Pain  Current pain ratin  At best pain ratin  At worst pain rating: 10  Quality: sharp  Relieving factors: medications  Aggravating factors: standing and walking    Social Support  Steps to enter house: yes  Stairs in house: no   Lives in: one-story house  Lives with: spouse    Employment status: not working    Diagnostic Tests  X-ray: abnormal (pt " noted to have received x-ray on R knee indicating bone on bone presentation however no x-ray noted in EPIC file.)        Objective     Palpation   Left   No palpable tenderness to the distal biceps femoris, distal semimembranosus, distal semitendinosus, lateral gastrocnemius, medial gastrocnemius, rectus femoris, vastus lateralis and vastus medialis.     Right   Tenderness of the distal biceps femoris, distal semimembranosus, distal semitendinosus, lateral gastrocnemius, medial gastrocnemius, rectus femoris, vastus lateralis and vastus medialis.     Tenderness   Left Knee   No tenderness in the inferior patella, lateral patella, medial joint line, medial patella and superior patella.     Right Knee   Tenderness in the inferior patella, lateral patella, medial joint line, medial patella and superior patella.     Active Range of Motion   Left Knee   Flexion: 118 degrees   Extension: 0 degrees     Right Knee   Flexion: 108 degrees   Extension: 0 degrees     Mobility   Patellar Mobility:   Left Knee   WFL: medial, lateral, superior and inferior.     Right Knee   Hypomobile: medial, lateral, superior and inferior   Tibiofemoral Mobility:   Left knee   Tibiofemoral tendons within functional limits include the anterior and posterior  Right knee Hypomobile in the anterior and posterior tibiofemoral tendon(s).     Strength/Myotome Testing     Left Knee   Flexion: 5  Extension: 5  Quadriceps contraction: good    Right Knee   Flexion: 4+  Extension: 5  Quadriceps contraction: good    Swelling     Left Knee Girth Measurement (cm)   Joint line: 43 cm  10 cm above joint line: 46 cm  10 cm below joint line: 39 cm    Right Knee Girth Measurement (cm)   Joint line: 44 cm  10 cm above joint line: 47 cm  10 cm below joint line: 40 cm    Functional Assessment      Squat    Right valgus.     Comments  5x STS, w/o UE: 16 sec.             Precautions: peter LE venous insufficiency      Manuals 2/14            STM             PROM            "  Joint Mobility                          Neuro Re-Ed             squat HEP 3x10            LAQ HEP 3x10, 5\" hold                                                                             Ther Ex             Hamstring stretch HEP 3x30\"            Leg press             Knee flexion             Knee extension              Quad stretch             Active warm up- rec bike                                       Ther Activity             Step ups                          Gait Training                                       Self Care             Education HEP, pre op physical therapy compared to post op, objective findings                              "

## 2024-02-21 ENCOUNTER — OFFICE VISIT (OUTPATIENT)
Dept: PHYSICAL THERAPY | Facility: CLINIC | Age: 79
End: 2024-02-21
Payer: COMMERCIAL

## 2024-02-21 DIAGNOSIS — M17.11 UNILATERAL PRIMARY OSTEOARTHRITIS, RIGHT KNEE: Primary | ICD-10-CM

## 2024-02-21 DIAGNOSIS — G89.29 CHRONIC PAIN OF RIGHT KNEE: ICD-10-CM

## 2024-02-21 DIAGNOSIS — M25.561 CHRONIC PAIN OF RIGHT KNEE: ICD-10-CM

## 2024-02-21 PROCEDURE — 97112 NEUROMUSCULAR REEDUCATION: CPT | Performed by: PHYSICAL THERAPIST

## 2024-02-21 PROCEDURE — 97110 THERAPEUTIC EXERCISES: CPT | Performed by: PHYSICAL THERAPIST

## 2024-02-21 NOTE — PROGRESS NOTES
"Daily Note     Today's date: 2024  Patient name: Stormy Hermosillo  : 1945  MRN: [id number]  Referring provider: Rodolfo Govea MD  Dx:   Encounter Diagnosis     ICD-10-CM    1. Unilateral primary osteoarthritis, right knee  M17.11       2. Chronic pain of right knee  M25.561     G89.29           Start Time: 08  Stop Time: 0845  Total time in clinic (min): 28 minutes    Subjective: The patient presents for the first follow-up appointment, reports that symptoms improved and that she was compliant most of the time with the initial HEP        Objective: See treatment diary below      Assessment: The patient tolerated manual and active treatment well today. The PT reviewed IHEP and introduced initial manual and ther-ex program during today's treatment. The patient demonstrated good response to today's treatment. The patient was also provided with further educational materials to promote a thorough understanding and appropriate preparation for her upcoming recovery.        Plan: Continue per plan of care.      Precautions: peter LE venous insufficiency      Manuals            STM             PROM             Joint Mobility                          Neuro Re-Ed             squat HEP 3x10 Review           LAQ HEP 3x10, 5\" hold Review           Supine quad set  3 sec x15, HEP                                                               Ther Ex             Hamstring stretch HEP 3x30\" Modified w/ strap, 10 sec x5, HEP           Leg press             Knee flexion             Knee extension              Quad stretch             Active warm up- rec bike                                       Ther Activity             Step ups                          Gait Training                                       Self Care             Education HEP, pre op physical therapy compared to post op, objective findings Total joint home prep checklist provided and reviewed                             "

## 2024-03-15 ENCOUNTER — OFFICE VISIT (OUTPATIENT)
Dept: PHYSICAL THERAPY | Facility: CLINIC | Age: 79
End: 2024-03-15
Payer: COMMERCIAL

## 2024-03-15 DIAGNOSIS — Z96.651 STATUS POST TOTAL RIGHT KNEE REPLACEMENT: Primary | ICD-10-CM

## 2024-03-15 DIAGNOSIS — M17.11 UNILATERAL PRIMARY OSTEOARTHRITIS, RIGHT KNEE: ICD-10-CM

## 2024-03-15 DIAGNOSIS — M25.561 CHRONIC PAIN OF RIGHT KNEE: ICD-10-CM

## 2024-03-15 DIAGNOSIS — G89.29 CHRONIC PAIN OF RIGHT KNEE: ICD-10-CM

## 2024-03-15 PROCEDURE — 97112 NEUROMUSCULAR REEDUCATION: CPT | Performed by: PHYSICAL THERAPIST

## 2024-03-15 PROCEDURE — 97161 PT EVAL LOW COMPLEX 20 MIN: CPT | Performed by: PHYSICAL THERAPIST

## 2024-03-15 NOTE — PROGRESS NOTES
PT Evaluation     Today's date: 3/15/2024  Patient name: Stormy Hermosillo  : 1945  MRN: [id number]  Referring provider: Rodolfo Govea MD  Dx:   Encounter Diagnosis     ICD-10-CM    1. Status post total right knee replacement  Z96.651       2. Unilateral primary osteoarthritis, right knee  M17.11       3. Chronic pain of right knee  M25.561     G89.29           Start Time: 0845  Stop Time: 925  Total time in clinic (min): 40 minutes      Patient treated and evaluated by JEANNA Wiseman, under my direct supervision.      Assessment  Assessment details: Problem List:  1) Decreased knee ROM   -Addressing with manual therapy, therapeutic exercise, neuromuscular re-education, therapeutic activity, gait training, flexibility training, and functional activity training    2) Decreased tolerance for functional activity   -Addressing with therapeutic exercise, therapeutic activity, gait training, and functional activity training    Stormy Hermosillo is a 78 y.o. female presenting status post right total knee arthroplasty on 3/12/24 with acute right knee pain. The patient presents with decreased knee strength and ROM. She has functional limitations with ADLs, ambulation, and transitional movements from sitting to standing. The patient would benefit from PT to address these deficits and to return to prior level of function. Interventions will include therapeutic exercise, functional activity retraining, LE strengthening program, manual therapy, a home exercise program and modalities as needed. Positive prognostic indicators include positive attitude toward recovery, good understanding of diagnosis and treatment plan options, acuity of symptoms, and absence of observed red flags. These factors indicate that the patient has a good  prognosis for recovery with conservative care.      Comparable Signs:  1. Knee extension ROM  2. Sit to stand    Goals  Short Term Goals: to be achieved by 4 weeks  1) Patient to be  independent with basic HEP  2) Decrease pain to 4/10 at its worst  3) Increase knee ROM by 5-10 degrees in all affected planes  4) Increase LE strength by 1/2 MMT grade in all affected planes    Long Term Goals: to be achieved by discharge  1) FOTO equal to or greater than projected goal.  2) Ambulation to improve to maximal level of function  3) Stair negotiation will improve to reciprocal  4) Sit to stand transfers will improve to maximal level of function      Plan  Planned therapy interventions: manual therapy, neuromuscular re-education, patient education, therapeutic exercise, therapeutic activities, IADL retraining and home exercise program  Frequency: 2x week  Duration in weeks: 8  Treatment plan discussed with: patient        Subjective Evaluation    History of Present Illness  Mechanism of injury: Stormy OLMEDO Jaimee presents s/p right TKA performed 3/12/24. Pt reports hx of R knee pain >1 year. Using exercise bike at home, but has not tried HEP as of yet. First /fu 4/8/24. She is currently ambulating with a rolling walker.  Home set up: 1 story home, 1STE, using RW at home, SPC, commode, grab bars in bathroom   Onset/duration: acute post surgical   Pain descriptors: dull, ache, sharp, and tight; denies numbness/tingling in R LE  24-hour pattern: 7/10 current, 8/10 at worst   Aggravating factors: getting in/out of chair  Alleviating factors: sitting with foot elevated, ice   Other: had L knee replaced approximately 10 years ago    Occupation/demands: retired   Physical activities/hobbies: gardening, housework      Goals: improving strength, getting back to normal routine      WELLS DVT CRITERIA:  DVT Risk  (+1) Active cancer within last 6 months negative  (+1) Paralysis, paresis or recent plaster immobilization of legs  negative  (+1) Recent Bedridden for >3 days or major surgery within last 12 weeks  positive  (+1) Localized tenderness along deep venous system  negative  (+1) Entire leg swelling   negative  (+1) Calf swelling > 3cm compared with asymptomatic leg (10cm below tibial tuberosity)  negative  (+1) Pitting Edema  negative  (+1) Collateral supervicial veins (non-varicose) negative  (+1) Previously documented DVT  not tested    (-2)  Alternative diagnosis more likely (muscle tear, cellulitis, etc)  positive    Score: -1  DVT considered likely in patients with score of 2 or more, unlikely if less than 2           Objective     Observations     Additional Observation Details  The surgical site was examined, appropriate closure of the wound and scar formation. Incision showed minimal signs of drainage.     3 skin skin blisters were observed around the site of the surgical tape; the largest one measured 2x4 cm and was yellow in color. PT called surgeon's office and left message to inform surgeon and team of this finding. Patient was advised to check blister and cover with a bandage if it opens to prevent infection.    Tenderness     Right Knee   Tenderness in the tibial tubercle.     Active Range of Motion   Left Knee   Flexion: 124 degrees   Extension: 0 degrees     Right Knee   Flexion: 75 degrees   Extension: -6 degrees     Strength/Myotome Testing     Left Knee   Flexion: 5  Extension: 5  Quadriceps contraction: good    Right Knee   Flexion: 3+  Extension: 3-  Quadriceps contraction: fair    Swelling     Left Knee Girth Measurement (cm)   10 cm below joint line: 40 cm    Right Knee Girth Measurement (cm)   10 cm below joint line: 43 cm    Ambulation   Weight-Bearing Status   Assistive device used: two-wheeled walker    Ambulation: Level Surfaces   Ambulation with assistive device: independent    Observational Gait   Decreased right stance time.     Functional Assessment        Comments  Sit to stand: extends right knee during sit to stand and weight bears more heavily on left LE. Increased UE use.             Precautions: standard precautions      Diagnosis S/p TKA on 3/12/24   Last Re-Evaluation   "  Date of Service 2/14 (pre-op) 2/21 3/15  (post-op)           Visit Count 1 2 3 4 5 6 7 8 9 10 11   Manuals              Manual stretching all planes              Patellar mobs all planes              Tibiofemoral mobs all planes                            Neuro Re-Ed              Quad set HEP 3 sec x15, HEP            QS + SLR HEP             Glute set bridge HEP                                                                     Ther Ex              Active warmup for flexibility and edema control              LAQ HEP 3x10, 5\" hold Review            Heel slides   HEP           Squats HEP 3x10 Review            Knee flex/ext              Leg press              Hamstring stretch HEP 3x30\" Modified w/ strap, 10 sec x5, HEP                                        Ther Activity              Stair training                                                                                    Gait Training              Assistive device training              Level surface ambulation                                          Active Range of Motion              Right knee ext-flex              Passive Range of Motion              Right knee ext-flex                            Assessment IE  IE, FOTO           Education HEP, pre op physical therapy compared to post op, objective findings Total joint home prep checklist provided and reviewed                     "

## 2024-03-15 NOTE — LETTER
March 15, 2024    Rodolfo Govea MD  [address] [place] [id number]    Patient: Stormy Hermosillo   YOB: 1945   Date of Visit: 3/15/2024     Encounter Diagnosis     ICD-10-CM    1. Status post total right knee replacement  Z96.651       2. Unilateral primary osteoarthritis, right knee  M17.11       3. Chronic pain of right knee  M25.561     G89.29           Dear Dr. Govea:    Thank you for your recent referral of Stormy Hermosillo. Please review the attached evaluation summary from Stormy's recent visit.     Please verify that you agree with the plan of care by signing the attached order.     If you have any questions or concerns, please do not hesitate to call.     I sincerely appreciate the opportunity to share in the care of one of your patients and hope to have another opportunity to work with you in the near future.       Sincerely,    Lucius Seo, PT      Referring Provider:      I certify that I have read the below Plan of Care and certify the need for these services furnished under this plan of treatment while under my care.                    Rodolfo Govea MD  [address] [place] [address]  Via Fax: [phone]          PT Evaluation     Today's date: 3/15/2024  Patient name: Stormy Hermosillo  : 1945  MRN: [id number]  Referring provider: Rodolfo Govea MD  Dx:   Encounter Diagnosis     ICD-10-CM    1. Status post total right knee replacement  Z96.651       2. Unilateral primary osteoarthritis, right knee  M17.11       3. Chronic pain of right knee  M25.561     G89.29           Start Time: 0845  Stop Time: 09  Total time in clinic (min): 40 minutes      Patient treated and evaluated by JEANNA Wiseman, under my direct supervision.      Assessment  Assessment details: Problem List:  1) Decreased knee ROM   -Addressing with manual therapy, therapeutic exercise, neuromuscular re-education, therapeutic activity, gait training, flexibility training,  and functional activity training    2) Decreased tolerance for functional activity   -Addressing with therapeutic exercise, therapeutic activity, gait training, and functional activity training    Stormy Hermosillo is a 78 y.o. female presenting status post right total knee arthroplasty on 3/12/24 with acute right knee pain. The patient presents with decreased knee strength and ROM. She has functional limitations with ADLs, ambulation, and transitional movements from sitting to standing. The patient would benefit from PT to address these deficits and to return to prior level of function. Interventions will include therapeutic exercise, functional activity retraining, LE strengthening program, manual therapy, a home exercise program and modalities as needed. Positive prognostic indicators include positive attitude toward recovery, good understanding of diagnosis and treatment plan options, acuity of symptoms, and absence of observed red flags. These factors indicate that the patient has a good  prognosis for recovery with conservative care.      Comparable Signs:  1. Knee extension ROM  2. Sit to stand    Goals  Short Term Goals: to be achieved by 4 weeks  1) Patient to be independent with basic HEP  2) Decrease pain to 4/10 at its worst  3) Increase knee ROM by 5-10 degrees in all affected planes  4) Increase LE strength by 1/2 MMT grade in all affected planes    Long Term Goals: to be achieved by discharge  1) FOTO equal to or greater than projected goal.  2) Ambulation to improve to maximal level of function  3) Stair negotiation will improve to reciprocal  4) Sit to stand transfers will improve to maximal level of function      Plan  Planned therapy interventions: manual therapy, neuromuscular re-education, patient education, therapeutic exercise, therapeutic activities, IADL retraining and home exercise program  Frequency: 2x week  Duration in weeks: 8  Treatment plan discussed with: patient        Subjective  Evaluation    History of Present Illness  Mechanism of injury: Stormy Hermosillo presents s/p right TKA performed 3/12/24. Pt reports hx of R knee pain >1 year. Using exercise bike at home, but has not tried HEP as of yet. First /fu 4/8/24. She is currently ambulating with a rolling walker.  Home set up: 1 story home, 1STE, using RW at home, SPC, commode, grab bars in bathroom   Onset/duration: acute post surgical   Pain descriptors: dull, ache, sharp, and tight; denies numbness/tingling in R LE  24-hour pattern: 7/10 current, 8/10 at worst   Aggravating factors: getting in/out of chair  Alleviating factors: sitting with foot elevated, ice   Other: had L knee replaced approximately 10 years ago    Occupation/demands: retired   Physical activities/hobbies: gardening, housework      Goals: improving strength, getting back to normal routine      WELLS DVT CRITERIA:  DVT Risk  (+1) Active cancer within last 6 months negative  (+1) Paralysis, paresis or recent plaster immobilization of legs  negative  (+1) Recent Bedridden for >3 days or major surgery within last 12 weeks  positive  (+1) Localized tenderness along deep venous system  negative  (+1) Entire leg swelling  negative  (+1) Calf swelling > 3cm compared with asymptomatic leg (10cm below tibial tuberosity)  negative  (+1) Pitting Edema  negative  (+1) Collateral supervicial veins (non-varicose) negative  (+1) Previously documented DVT  not tested    (-2)  Alternative diagnosis more likely (muscle tear, cellulitis, etc)  positive    Score: -1  DVT considered likely in patients with score of 2 or more, unlikely if less than 2           Objective     Observations     Additional Observation Details  The surgical site was examined, appropriate closure of the wound and scar formation. Incision showed minimal signs of drainage.     3 skin skin blisters were observed around the site of the surgical tape; the largest one measured 2x4 cm and was yellow in color. PT called  "surgeon's office and left message to inform surgeon and team of this finding. Patient was advised to check blister and cover with a bandage if it opens to prevent infection.    Tenderness     Right Knee   Tenderness in the tibial tubercle.     Active Range of Motion   Left Knee   Flexion: 124 degrees   Extension: 0 degrees     Right Knee   Flexion: 75 degrees   Extension: -6 degrees     Strength/Myotome Testing     Left Knee   Flexion: 5  Extension: 5  Quadriceps contraction: good    Right Knee   Flexion: 3+  Extension: 3-  Quadriceps contraction: fair    Swelling     Left Knee Girth Measurement (cm)   10 cm below joint line: 40 cm    Right Knee Girth Measurement (cm)   10 cm below joint line: 43 cm    Ambulation   Weight-Bearing Status   Assistive device used: two-wheeled walker    Ambulation: Level Surfaces   Ambulation with assistive device: independent    Observational Gait   Decreased right stance time.     Functional Assessment        Comments  Sit to stand: extends right knee during sit to stand and weight bears more heavily on left LE. Increased UE use.             Precautions: standard precautions      Diagnosis S/p TKA on 3/12/24   Last Re-Evaluation    Date of Service 2/14 (pre-op) 2/21 3/15  (post-op)           Visit Count 1 2 3 4 5 6 7 8 9 10 11   Manuals              Manual stretching all planes              Patellar mobs all planes              Tibiofemoral mobs all planes                            Neuro Re-Ed              Quad set HEP 3 sec x15, HEP            QS + SLR HEP             Glute set bridge HEP                                                                     Ther Ex              Active warmup for flexibility and edema control              LAQ HEP 3x10, 5\" hold Review            Heel slides   HEP           Squats HEP 3x10 Review            Knee flex/ext              Leg press              Hamstring stretch HEP 3x30\" Modified w/ strap, 10 sec x5, HEP                                      "   Ther Activity              Stair training                                                                                    Gait Training              Assistive device training              Level surface ambulation                                          Active Range of Motion              Right knee ext-flex              Passive Range of Motion              Right knee ext-flex                            Assessment IE  IE, FOTO           Education HEP, pre op physical therapy compared to post op, objective findings Total joint home prep checklist provided and reviewed

## 2024-03-18 ENCOUNTER — APPOINTMENT (OUTPATIENT)
Dept: RADIOLOGY | Facility: HOSPITAL | Age: 79
DRG: 299 | End: 2024-03-18
Payer: COMMERCIAL

## 2024-03-18 ENCOUNTER — HOSPITAL ENCOUNTER (INPATIENT)
Facility: HOSPITAL | Age: 79
LOS: 3 days | Discharge: HOME | DRG: 299 | End: 2024-03-21
Attending: EMERGENCY MEDICINE | Admitting: INTERNAL MEDICINE
Payer: COMMERCIAL

## 2024-03-18 ENCOUNTER — APPOINTMENT (EMERGENCY)
Dept: VASCULAR ULTRASOUND | Facility: HOSPITAL | Age: 79
DRG: 299 | End: 2024-03-18
Payer: COMMERCIAL

## 2024-03-18 ENCOUNTER — APPOINTMENT (OUTPATIENT)
Dept: PHYSICAL THERAPY | Facility: CLINIC | Age: 79
End: 2024-03-18
Payer: COMMERCIAL

## 2024-03-18 ENCOUNTER — APPOINTMENT (EMERGENCY)
Dept: NON INVASIVE DIAGNOSTICS | Facility: HOSPITAL | Age: 79
DRG: 299 | End: 2024-03-18
Payer: COMMERCIAL

## 2024-03-18 ENCOUNTER — APPOINTMENT (EMERGENCY)
Dept: CT IMAGING | Facility: HOSPITAL | Age: 79
DRG: 299 | End: 2024-03-18
Payer: COMMERCIAL

## 2024-03-18 DIAGNOSIS — I82.4Z1 ACUTE DEEP VEIN THROMBOSIS (DVT) OF DISTAL VEIN OF RIGHT LOWER EXTREMITY (HCC): ICD-10-CM

## 2024-03-18 DIAGNOSIS — R79.89 ELEVATED BRAIN NATRIURETIC PEPTIDE (BNP) LEVEL: ICD-10-CM

## 2024-03-18 DIAGNOSIS — I26.99 PULMONARY EMBOLISM, BILATERAL (HCC): ICD-10-CM

## 2024-03-18 DIAGNOSIS — M79.89 RIGHT LEG SWELLING: ICD-10-CM

## 2024-03-18 DIAGNOSIS — Z96.651 HISTORY OF TOTAL KNEE ARTHROPLASTY, RIGHT: ICD-10-CM

## 2024-03-18 DIAGNOSIS — I26.99 PULMONARY EMBOLISM (HCC): ICD-10-CM

## 2024-03-18 DIAGNOSIS — I48.92 ATRIAL FLUTTER, PAROXYSMAL (HCC): ICD-10-CM

## 2024-03-18 DIAGNOSIS — R09.02 HYPOXIA: Primary | ICD-10-CM

## 2024-03-18 PROBLEM — K21.9 GERD (GASTROESOPHAGEAL REFLUX DISEASE): Status: ACTIVE | Noted: 2024-03-18

## 2024-03-18 PROBLEM — I82.441 ACUTE DEEP VEIN THROMBOSIS (DVT) OF TIBIAL VEIN OF RIGHT LOWER EXTREMITY (HCC): Status: ACTIVE | Noted: 2024-03-18

## 2024-03-18 PROBLEM — E78.5 HYPERLIPIDEMIA: Status: ACTIVE | Noted: 2024-03-18

## 2024-03-18 LAB
2HR DELTA HS TROPONIN: -2 NG/L
4HR DELTA HS TROPONIN: -3 NG/L
ALBUMIN SERPL BCP-MCNC: 3.7 G/DL (ref 3.5–5)
ALP SERPL-CCNC: 79 U/L (ref 34–104)
ALT SERPL W P-5'-P-CCNC: 27 U/L (ref 7–52)
ANION GAP SERPL CALCULATED.3IONS-SCNC: 9 MMOL/L (ref 4–13)
AORTIC ROOT: 2.9 CM
APICAL FOUR CHAMBER EJECTION FRACTION: 65 %
APTT PPP: 28 SECONDS (ref 23–37)
APTT PPP: 90 SECONDS (ref 23–37)
ASCENDING AORTA: 3.3 CM
AST SERPL W P-5'-P-CCNC: 23 U/L (ref 13–39)
ATRIAL RATE: 94 BPM
BASOPHILS # BLD AUTO: 0.07 THOUSANDS/ÂΜL (ref 0–0.1)
BASOPHILS NFR BLD AUTO: 1 % (ref 0–1)
BILIRUB SERPL-MCNC: 0.91 MG/DL (ref 0.2–1)
BNP SERPL-MCNC: 340 PG/ML (ref 0–100)
BSA FOR ECHO PROCEDURE: 1.88 M2
BUN SERPL-MCNC: 18 MG/DL (ref 5–25)
CALCIUM SERPL-MCNC: 8.8 MG/DL (ref 8.4–10.2)
CARDIAC TROPONIN I PNL SERPL HS: 16 NG/L
CARDIAC TROPONIN I PNL SERPL HS: 17 NG/L
CARDIAC TROPONIN I PNL SERPL HS: 19 NG/L
CHLORIDE SERPL-SCNC: 105 MMOL/L (ref 96–108)
CO2 SERPL-SCNC: 25 MMOL/L (ref 21–32)
CREAT SERPL-MCNC: 0.71 MG/DL (ref 0.6–1.3)
E WAVE DECELERATION TIME: 138 MS
E/A RATIO: 0.63
EOSINOPHIL # BLD AUTO: 0.22 THOUSAND/ÂΜL (ref 0–0.61)
EOSINOPHIL NFR BLD AUTO: 2 % (ref 0–6)
ERYTHROCYTE [DISTWIDTH] IN BLOOD BY AUTOMATED COUNT: 14.4 % (ref 11.6–15.1)
FRACTIONAL SHORTENING: 32 (ref 28–44)
GFR SERPL CREATININE-BSD FRML MDRD: 81 ML/MIN/1.73SQ M
GLUCOSE SERPL-MCNC: 105 MG/DL (ref 65–140)
HCT VFR BLD AUTO: 36.1 % (ref 34.8–46.1)
HGB BLD-MCNC: 11.4 G/DL (ref 11.5–15.4)
IMM GRANULOCYTES # BLD AUTO: 0.11 THOUSAND/UL (ref 0–0.2)
IMM GRANULOCYTES NFR BLD AUTO: 1 % (ref 0–2)
INR PPP: 0.97 (ref 0.84–1.19)
INTERVENTRICULAR SEPTUM IN DIASTOLE (PARASTERNAL SHORT AXIS VIEW): 1.1 CM
INTERVENTRICULAR SEPTUM: 1.1 CM (ref 0.6–1.1)
LAAS-AP2: 12.8 CM2
LAAS-AP4: 10.6 CM2
LEFT ATRIUM SIZE: 3.5 CM
LEFT ATRIUM VOLUME (MOD BIPLANE): 25 ML
LEFT ATRIUM VOLUME INDEX (MOD BIPLANE): 13.3 ML/M2
LEFT INTERNAL DIMENSION IN SYSTOLE: 2.8 CM (ref 2.1–4)
LEFT VENTRICULAR INTERNAL DIMENSION IN DIASTOLE: 4.1 CM (ref 3.5–6)
LEFT VENTRICULAR POSTERIOR WALL IN END DIASTOLE: 1 CM
LEFT VENTRICULAR STROKE VOLUME: 47 ML
LVSV (TEICH): 47 ML
LYMPHOCYTES # BLD AUTO: 2.26 THOUSANDS/ÂΜL (ref 0.6–4.47)
LYMPHOCYTES NFR BLD AUTO: 25 % (ref 14–44)
MCH RBC QN AUTO: 29.2 PG (ref 26.8–34.3)
MCHC RBC AUTO-ENTMCNC: 31.6 G/DL (ref 31.4–37.4)
MCV RBC AUTO: 92 FL (ref 82–98)
MONOCYTES # BLD AUTO: 1.09 THOUSAND/ÂΜL (ref 0.17–1.22)
MONOCYTES NFR BLD AUTO: 12 % (ref 4–12)
MV E'TISSUE VEL-LAT: 10 CM/S
MV E'TISSUE VEL-SEP: 7 CM/S
MV PEAK A VEL: 1.26 M/S
MV PEAK E VEL: 79 CM/S
MV STENOSIS PRESSURE HALF TIME: 40 MS
MV VALVE AREA P 1/2 METHOD: 5.5
NEUTROPHILS # BLD AUTO: 5.46 THOUSANDS/ÂΜL (ref 1.85–7.62)
NEUTS SEG NFR BLD AUTO: 59 % (ref 43–75)
NRBC BLD AUTO-RTO: 0 /100 WBCS
P AXIS: 66 DEGREES
PLATELET # BLD AUTO: 327 THOUSANDS/UL (ref 149–390)
PMV BLD AUTO: 10 FL (ref 8.9–12.7)
POTASSIUM SERPL-SCNC: 4.1 MMOL/L (ref 3.5–5.3)
PR INTERVAL: 138 MS
PROT SERPL-MCNC: 6.5 G/DL (ref 6.4–8.4)
PROTHROMBIN TIME: 13.5 SECONDS (ref 11.6–14.5)
QRS AXIS: 94 DEGREES
QRSD INTERVAL: 78 MS
QT INTERVAL: 352 MS
QTC INTERVAL: 440 MS
RA PRESSURE ESTIMATED: 5 MMHG
RBC # BLD AUTO: 3.91 MILLION/UL (ref 3.81–5.12)
RIGHT ATRIUM AREA SYSTOLE A4C: 13.4 CM2
RV PSP: 52 MMHG
SL CV LEFT ATRIUM LENGTH A2C: 4.7 CM
SL CV LV EF: 65
SL CV PED ECHO LEFT VENTRICLE DIASTOLIC VOLUME (MOD BIPLANE) 2D: 76 ML
SL CV PED ECHO LEFT VENTRICLE SYSTOLIC VOLUME (MOD BIPLANE) 2D: 29 ML
SODIUM SERPL-SCNC: 139 MMOL/L (ref 135–147)
T WAVE AXIS: 43 DEGREES
TR MAX PG: 47 MMHG
TR PEAK VELOCITY: 3.4 M/S
TRICUSPID ANNULAR PLANE SYSTOLIC EXCURSION: 2.2 CM
TRICUSPID VALVE PEAK REGURGITATION VELOCITY: 3.43 M/S
VENTRICULAR RATE: 94 BPM
WBC # BLD AUTO: 9.21 THOUSAND/UL (ref 4.31–10.16)

## 2024-03-18 PROCEDURE — 93971 EXTREMITY STUDY: CPT | Performed by: SURGERY

## 2024-03-18 PROCEDURE — 85610 PROTHROMBIN TIME: CPT | Performed by: EMERGENCY MEDICINE

## 2024-03-18 PROCEDURE — 36415 COLL VENOUS BLD VENIPUNCTURE: CPT

## 2024-03-18 PROCEDURE — 93306 TTE W/DOPPLER COMPLETE: CPT

## 2024-03-18 PROCEDURE — 96375 TX/PRO/DX INJ NEW DRUG ADDON: CPT

## 2024-03-18 PROCEDURE — 93005 ELECTROCARDIOGRAM TRACING: CPT

## 2024-03-18 PROCEDURE — 99223 1ST HOSP IP/OBS HIGH 75: CPT | Performed by: INTERNAL MEDICINE

## 2024-03-18 PROCEDURE — 80053 COMPREHEN METABOLIC PANEL: CPT | Performed by: EMERGENCY MEDICINE

## 2024-03-18 PROCEDURE — 84484 ASSAY OF TROPONIN QUANT: CPT | Performed by: EMERGENCY MEDICINE

## 2024-03-18 PROCEDURE — 93971 EXTREMITY STUDY: CPT

## 2024-03-18 PROCEDURE — 85025 COMPLETE CBC W/AUTO DIFF WBC: CPT | Performed by: EMERGENCY MEDICINE

## 2024-03-18 PROCEDURE — 96365 THER/PROPH/DIAG IV INF INIT: CPT

## 2024-03-18 PROCEDURE — 83880 ASSAY OF NATRIURETIC PEPTIDE: CPT | Performed by: EMERGENCY MEDICINE

## 2024-03-18 PROCEDURE — 71275 CT ANGIOGRAPHY CHEST: CPT

## 2024-03-18 PROCEDURE — 71045 X-RAY EXAM CHEST 1 VIEW: CPT

## 2024-03-18 PROCEDURE — 85730 THROMBOPLASTIN TIME PARTIAL: CPT | Performed by: EMERGENCY MEDICINE

## 2024-03-18 PROCEDURE — 85730 THROMBOPLASTIN TIME PARTIAL: CPT | Performed by: NURSE PRACTITIONER

## 2024-03-18 PROCEDURE — 93010 ELECTROCARDIOGRAM REPORT: CPT | Performed by: INTERNAL MEDICINE

## 2024-03-18 PROCEDURE — 93306 TTE W/DOPPLER COMPLETE: CPT | Performed by: INTERNAL MEDICINE

## 2024-03-18 PROCEDURE — 99285 EMERGENCY DEPT VISIT HI MDM: CPT

## 2024-03-18 RX ORDER — ACETAMINOPHEN 325 MG/1
650 TABLET ORAL ONCE
Status: COMPLETED | OUTPATIENT
Start: 2024-03-18 | End: 2024-03-18

## 2024-03-18 RX ORDER — HEPARIN SODIUM 1000 [USP'U]/ML
3000 INJECTION, SOLUTION INTRAVENOUS; SUBCUTANEOUS EVERY 6 HOURS PRN
Status: DISCONTINUED | OUTPATIENT
Start: 2024-03-18 | End: 2024-03-20

## 2024-03-18 RX ORDER — ACETAMINOPHEN 325 MG/1
650 TABLET ORAL EVERY 6 HOURS PRN
Status: DISCONTINUED | OUTPATIENT
Start: 2024-03-18 | End: 2024-03-19

## 2024-03-18 RX ORDER — HEPARIN SODIUM 10000 [USP'U]/100ML
3-30 INJECTION, SOLUTION INTRAVENOUS
Status: DISCONTINUED | OUTPATIENT
Start: 2024-03-18 | End: 2024-03-20

## 2024-03-18 RX ORDER — HEPARIN SODIUM 1000 [USP'U]/ML
6000 INJECTION, SOLUTION INTRAVENOUS; SUBCUTANEOUS EVERY 6 HOURS PRN
Status: DISCONTINUED | OUTPATIENT
Start: 2024-03-18 | End: 2024-03-20

## 2024-03-18 RX ORDER — CHLORHEXIDINE GLUCONATE ORAL RINSE 1.2 MG/ML
15 SOLUTION DENTAL EVERY 12 HOURS SCHEDULED
Status: DISCONTINUED | OUTPATIENT
Start: 2024-03-18 | End: 2024-03-21 | Stop reason: HOSPADM

## 2024-03-18 RX ORDER — HEPARIN SODIUM 1000 [USP'U]/ML
6000 INJECTION, SOLUTION INTRAVENOUS; SUBCUTANEOUS ONCE
Status: COMPLETED | OUTPATIENT
Start: 2024-03-18 | End: 2024-03-18

## 2024-03-18 RX ADMIN — HEPARIN SODIUM 6000 UNITS: 1000 INJECTION INTRAVENOUS; SUBCUTANEOUS at 14:00

## 2024-03-18 RX ADMIN — ACETAMINOPHEN 650 MG: 325 TABLET, FILM COATED ORAL at 13:41

## 2024-03-18 RX ADMIN — ACETAMINOPHEN 650 MG: 325 TABLET, FILM COATED ORAL at 22:54

## 2024-03-18 RX ADMIN — HEPARIN SODIUM 18 UNITS/KG/HR: 10000 INJECTION, SOLUTION INTRAVENOUS at 14:00

## 2024-03-18 RX ADMIN — IOHEXOL 85 ML: 350 INJECTION, SOLUTION INTRAVENOUS at 13:27

## 2024-03-18 NOTE — H&P
[facility]  H&P  Name: Stormy Hermosillo 78 y.o. female I MRN: [id number]  Unit/Bed#: ED-07 I Date of Admission: 3/18/2024   Date of Service: 3/18/2024 I Hospital Day: 0      Assessment/Plan   * Pulmonary embolism, bilateral (HCC)  Assessment & Plan  POA with worsening shortness of breath s/p right total knee arthroplasty on 3/12/2024.  Reports taking aspirin 81 mg, twice daily since her procedure.  Venous duplex notable for evidence of acute nonocclusive DVT in the posterior tibial and peroneal veins.  Well-defined hypoechoic nonvascular cystic type structure, 5.6 cm noted in the popliteal fossa.  3/18: CTA PE: Extensive bilateral pulmonary emboli involving the main arteries and lower branches with some extending distally to the subsegmental level.  No discrete areas of parenchymal infarction. Calculated RV/LV ratio 1.14.  3/18: Venous duplex notable for evidence of acute nonocclusive DVT in the R posterior tibial and peroneal veins. Well-defined hypoechoic nonvascular cystic type structure, 5.6 cm noted in the popliteal fossa.  3/18: Echo EF 65%, G1 DD. Right ventricle is not well-visualized, cavity size is dilated. Normal TAPSE. RVSP 52 mmHg.     Plan:  PESI score: Intermediate risk.  Heparin VTE/PE ggt.     History of total knee arthroplasty, right  Assessment & Plan  S/p right total knee arthroplasty on 3/12/2024.     GERD (gastroesophageal reflux disease)  Assessment & Plan  Hold home PTA Prilosec.     Hyperlipidemia  Assessment & Plan  Hold home PTA Lipitor.     Acute deep vein thrombosis (DVT) of distal vein of right lower extremity (HCC)  Assessment & Plan  3/18: Venous duplex notable for evidence of acute nonocclusive DVT in the R posterior tibial and peroneal veins. Well-defined hypoechoic nonvascular cystic type structure, 5.6 cm noted in the popliteal fossa.           History of Present Illness     HPI: Stormy Hermosillo is a 78 y.o. 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  She also reports right lower extremity swelling, color change since her surgery.  She also has a vesicle, 1 ruptured on the right knee. She endorses to chills but no fever.  She denies any abdominal pain, diarrhea, constipation, headaches, urinary symptoms. She is taking oxycodone for pain.  She reports resuming her aspirin after the procedure.  She reports taking aspirin twice a day since the procedure. She denies any prior history of blood clots, miscarriages, cancer, OCPs use.  She denies any prior history of intracranial hemorrhage or ischemic stroke.  She does report prior history of spinal surgery about 5 years ago.      History obtained from the patient.  Review of Systems   Constitutional:  Positive for chills. Negative for fever.   Respiratory:  Positive for shortness of breath. Negative for cough.    Cardiovascular:  Negative for chest pain.   Gastrointestinal:  Negative for abdominal pain, constipation, diarrhea, nausea and vomiting.   Skin:  Positive for color change and wound.     Disposition: Stepdown Level 1  Historical Information   No past medical history on file. Past Surgical History:  No date: BACK SURGERY  No date: BREAST SURGERY  No date: FOOT SURGERY  No date: HIP SURGERY; Left  No date: KNEE SURGERY; Left   Current Outpatient Medications   Medication Instructions    acetaminophen (TYLENOL) 500 mg, Oral, Every 6 hours    aspirin 81 mg, Oral    atorvastatin (LIPITOR) 10 mg tablet No dose, route, or frequency recorded.    Elastic Bandages & Supports (MEDICAL COMPRESSION STOCKINGS) MISC Does not apply, Daily, Knee High 20-30mmHg    Multiple Vitamins-Minerals (MULTIVITAMIN ADULT PO) 1 tablet, Oral    omeprazole (PRILOSEC) 20 mg, Oral, 2 times daily    Allergies   Allergen Reactions    Tramadol GI  Intolerance      Social History     Tobacco Use    Smoking status: Never    Smokeless tobacco: Never   Vaping Use    Vaping status: Never Used   Substance Use Topics    Alcohol use: Yes     Comment: rarely    Drug use: No    Family History   Problem Relation Age of Onset    Heart disease Mother     Cancer Mother     Varicose Veins Mother     Heart disease Father     Heart disease Sister     Cancer Sister     Cancer Brother     Varicose Veins Maternal Grandmother           Objective                            Vitals I/O      Most Recent Min/Max in 24hrs   Temp 97.6 °F (36.4 °C) Temp  Min: 97.6 °F (36.4 °C)  Max: 97.6 °F (36.4 °C)   Pulse 88 Pulse  Min: 85  Max: 111   Resp 22 Resp  Min: 22  Max: 24   /87 BP  Min: 143/85  Max: 191/98   O2 Sat 93 % SpO2  Min: 91 %  Max: 96 %    No intake or output data in the 24 hours ending 24 1738    Diet Clear Liquid    Invasive Monitoring           Physical Exam   Physical Exam  Eyes:      Extraocular Movements: Extraocular movements intact.      Conjunctiva/sclera: Conjunctivae normal.   Skin:     General: Skin is warm.      Capillary Refill: Capillary refill takes less than 2 seconds.      Findings: Rash and wound present.      Comments: Healing surgical incisions on the right anterior knee.  Ruptured vesicle on the anterior knee with serous drainage.  Right lower extremity with erythematous, shiny, smooth swollen up to the knees. Ecchymosis in the medial aspect of the ankle.   HENT:      Head: Normocephalic and atraumatic.      Mouth/Throat:      Mouth: Mucous membranes are moist.   Cardiovascular:      Rate and Rhythm: Normal rate and regular rhythm.   Musculoskeletal:      Right lower le+ Edema present.      Left lower leg: No edema.   Abdominal:      Palpations: Abdomen is soft.      Tenderness: There is no abdominal tenderness.   Constitutional:       General: She is not in acute distress.     Appearance: She is not ill-appearing.   Pulmonary:      Effort:  Pulmonary effort is normal. No respiratory distress.      Breath sounds: Normal breath sounds.   Neurological:      Mental Status: She is alert and oriented to person, place and time.            Diagnostic Studies      EKG: NSR 94, PVC  Imaging: CTA PE I have personally reviewed pertinent reports.       Medications:  Scheduled PRN   bupivacaine, 0.5 mL,       bupivacaine, 0.5 mL,   heparin (porcine), 3,000 Units, Q6H PRN  heparin (porcine), 6,000 Units, Q6H PRN       Continuous    heparin (porcine), 3-30 Units/kg/hr (Order-Specific), Last Rate: 18 Units/kg/hr (03/18/24 1400)         Labs:    CBC    Recent Labs     03/18/24  1047   WBC 9.21   HGB 11.4*   HCT 36.1        BMP    Recent Labs     03/18/24  1047   SODIUM 139   K 4.1      CO2 25   AGAP 9   BUN 18   CREATININE 0.71   CALCIUM 8.8       Coags    Recent Labs     03/18/24  1047   INR 0.97   PTT 28        Additional Electrolytes  No recent results       Blood Gas    No recent results  No recent results LFTs  Recent Labs     03/18/24  1047   ALT 27   AST 23   ALKPHOS 79   ALB 3.7   TBILI 0.91       Infectious  No recent results  Glucose  Recent Labs     03/18/24  1047   GLUC 105             Anticipated Length of Stay is > 2 midnights  Kleber Brown DO

## 2024-03-18 NOTE — PROGRESS NOTES
[facility]  Consult Note: Pulmonary Embolism Response Team  Name: Stormy Hermosillo 78 y.o. female I MRN: [id number]  Unit/Bed#: ED-07 I Date of Admission: 3/18/2024   Date of Service: 3/18/2024 I Hospital Day: 0    Assessment/Plan      Classification per PERT guidelines: Intermediate/Low risk  Additional PERT team notified: Yes and Interventional Radiology  Admit to Stepdown Level 1 at [facility]  Treatment recommendations: anticoagulation alone       History of Present Illness     Brief HPI: Stormy Hermosillo is a 78 y.o. who presents with dyspnea post right total knee replacement    Consult requested by: ED  Consult time: 1500    Objective    PESI  Age: 78 years old  Sex: 0  History of Cancer: 0  History of Heart Failure: 0  History of Chronic Lung Disease: 0  Heart rate greater than or equal to 110: 20  Systolic BP < 100 mmH  Respiratory rate greater than or equal to 30: 0  Temperature <36°C/96.8°F: 0  Altered Mental Status (Disorientation, lethargy, stupor, or coma): 0  O2 saturation <90%: 20  PESI Score Results: 119    Given current HR 80-90's at rest, single  at presentation - determined PESI score of 99            Class I Low Risk <66   Class II  66-85   Class III     Class IV High Risk 106-125   Class V  >125       Diagnostic Studies      CTA findings: CT angio 3/18 - bilateral PEs with thin strand saddle PE, RV/LV 1.14, no effusions, basilar atelectasis   ECHO: TTE 3/2024 EF 65%, grade I diastolic dysfunction, RV dilated poor visualization, RVSP 52, normal TAPS      Selected Labs     BNP  Recent Labs     24  1047   *      Troponin  Trop 19          Geraldo Alves DO

## 2024-03-18 NOTE — ASSESSMENT & PLAN NOTE
POA with worsening shortness of breath s/p right total knee arthroplasty on 3/12/2024.  Reports taking aspirin 81 mg, twice daily since her procedure.  Venous duplex notable for evidence of acute nonocclusive DVT in the posterior tibial and peroneal veins.  Well-defined hypoechoic nonvascular cystic type structure, 5.6 cm noted in the popliteal fossa.  3/18: CTA PE: Extensive bilateral pulmonary emboli involving the main arteries and lower branches with some extending distally to the subsegmental level.  No discrete areas of parenchymal infarction. Calculated RV/LV ratio 1.14.  3/18: Venous duplex notable for evidence of acute nonocclusive DVT in the R posterior tibial and peroneal veins. Well-defined hypoechoic nonvascular cystic type structure, 5.6 cm noted in the popliteal fossa.  3/18: Echo EF 65%, G1 DD. Right ventricle is not well-visualized, cavity size is dilated. Normal TAPSE. RVSP 52 mmHg.     Plan:  PESI score: Intermediate risk.  Heparin VTE/PE ggt.

## 2024-03-18 NOTE — ASSESSMENT & PLAN NOTE
POA with worsening shortness of breath s/p right total knee arthroplasty on 3/12/2024.  Reports taking aspirin 81 mg, twice daily since her procedure.  Venous duplex notable for evidence of acute nonocclusive DVT in the posterior tibial and peroneal veins.  Well-defined hypoechoic nonvascular cystic type structure, 5.6 cm noted in the popliteal fossa.  3/18: CTA PE: Extensive bilateral pulmonary emboli involving the main arteries and lower branches with some extending distally to the subsegmental level.  No discrete areas of parenchymal infarction.  Calculated RV/LV ratio 1.14.    Plan:  Continue Eliquis 10 mg twice daily for 1 week, then transition to 5 mg twice daily

## 2024-03-18 NOTE — ASSESSMENT & PLAN NOTE
POA with worsening shortness of breath s/p right total knee arthroplasty on 3/12/2024. Reports taking aspirin 81 mg, twice daily since her procedure.  Venous duplex notable for evidence of acute nonocclusive DVT in the posterior tibial and peroneal veins.  Well-defined hypoechoic nonvascular cystic type structure, 5.6 cm noted in the popliteal fossa.  3/18: CTA PE: Extensive bilateral pulmonary emboli involving the main arteries and lower branches with some extending distally to the subsegmental level.  No discrete areas of parenchymal infarction. Calculated RV/LV ratio 1.14.  3/18: Venous duplex notable for evidence of acute nonocclusive DVT in the R posterior tibial and peroneal veins. Well-defined hypoechoic nonvascular cystic type structure, 5.6 cm noted in the popliteal fossa.  3/18: Echo EF 65%, G1 DD. Right ventricle is not well-visualized, cavity size is dilated. Normal TAPSE. RVSP 52 mmHg.  Etiology of PE, provoked in the setting of recent surgery.     Plan:  PESI score: Intermediate risk.  Heparin VTE/PE ggt.   Monitor hemodynamics closely -should any change occur reengage PERT team for possible thrombectomy.

## 2024-03-18 NOTE — ED PROVIDER NOTES
History  Chief Complaint   Patient presents with    Shortness of Breath     Pt c/o SOB over the last few days. Had right knee replacement last week. Reports significant swelling and warmth in right leg. Denies CP.      This is a pleasant 78-year-old female presenting today with concerns of shortness of breath that is gradually increasing the last couple days.  Patient also was considered with right lower leg pain, swelling, color change.  Patient had a right knee full replacement 6 days ago on March 12.  Patient states that she was doing fine after surgery and over the next couple days was okay however when she started taking the oxycodone for her pain she began to feel much worse and had increasing shortness of breath.  Patient denies any cough.  Patient does not take any blood thinner but does take a baby aspirin daily.  Denies any nausea vomiting or any pain in the chest or back.  No neck pain or pain in the arms.  States that most of the pain that she is experiencing is in her right lower extremity, distal to the knee itself.  Knee has been draining, a clear fluid.  Patient denies any abdominal pain.  No diarrhea constipation.  No urinary symptoms.  Denies any headache, confusion.  Family denies any altered mental status.  Patient states that her shortness of breath feels like she just ran a far distance and simply cannot catch her breath.  Patient does admit to some chills but has been checking her temperature and has not had a fever over the last few days.        Prior to Admission Medications   Prescriptions Last Dose Informant Patient Reported? Taking?   Elastic Bandages & Supports (MEDICAL COMPRESSION STOCKINGS) MISC  Self No No   Sig: by Does not apply route daily Knee High 20-30mmHg   Multiple Vitamins-Minerals (MULTIVITAMIN ADULT PO)  Self Yes No   Sig: Take 1 tablet by mouth   acetaminophen (TYLENOL) 500 mg tablet  Self Yes No   Sig: Take 500 mg by mouth every 6 (six) hours   aspirin 81 MG tablet  Self  Yes No   Sig: Take 81 mg by mouth   atorvastatin (LIPITOR) 10 mg tablet  Self Yes No   omeprazole (PriLOSEC) 20 mg delayed release capsule  Self Yes No   Sig: Take 20 mg by mouth 2 (two) times a day      Facility-Administered Medications Last Administration Doses Remaining   bupivacaine (MARCAINE) 0.25 % injection 0.5 mL 12/14/2022  3:59 PM           History reviewed. No pertinent past medical history.    Past Surgical History:   Procedure Laterality Date    BACK SURGERY      BREAST SURGERY      FOOT SURGERY      HIP SURGERY Left     KNEE SURGERY Left        Family History   Problem Relation Age of Onset    Heart disease Mother     Cancer Mother     Varicose Veins Mother     Heart disease Father     Heart disease Sister     Cancer Sister     Cancer Brother     Varicose Veins Maternal Grandmother      I have reviewed and agree with the history as documented.    E-Cigarette/Vaping    E-Cigarette Use Never User      E-Cigarette/Vaping Substances     Social History     Tobacco Use    Smoking status: Never    Smokeless tobacco: Never   Vaping Use    Vaping status: Never Used   Substance Use Topics    Alcohol use: Yes     Comment: rarely    Drug use: No       Review of Systems   Constitutional:  Positive for chills. Negative for fatigue and fever.   HENT:  Negative for ear pain and sore throat.    Eyes:  Negative for photophobia, pain and visual disturbance.   Respiratory:  Positive for shortness of breath. Negative for apnea, cough, choking, chest tightness, wheezing and stridor.    Cardiovascular:  Positive for leg swelling. Negative for chest pain and palpitations.   Gastrointestinal:  Negative for abdominal pain, constipation, diarrhea, nausea and vomiting.   Genitourinary:  Negative for dysuria and hematuria.   Musculoskeletal:  Negative for arthralgias and back pain.   Skin:  Negative for color change and rash.   Neurological:  Negative for dizziness, seizures, syncope, weakness, light-headedness and headaches.    All other systems reviewed and are negative.      Physical Exam  Physical Exam  Vitals and nursing note reviewed.   Constitutional:       General: She is in acute distress.      Appearance: She is well-developed. She is ill-appearing.      Interventions: She is not intubated.  HENT:      Head: Normocephalic and atraumatic.   Eyes:      Conjunctiva/sclera: Conjunctivae normal.   Cardiovascular:      Rate and Rhythm: Normal rate and regular rhythm.      Heart sounds: No murmur heard.  Pulmonary:      Effort: Pulmonary effort is normal. Tachypnea present. No bradypnea, accessory muscle usage or respiratory distress. She is not intubated.      Breath sounds: Normal breath sounds. No stridor. No decreased breath sounds, wheezing, rhonchi or rales.   Chest:      Chest wall: No mass or tenderness.   Abdominal:      Palpations: Abdomen is soft.      Tenderness: There is no abdominal tenderness.   Musculoskeletal:         General: No swelling.      Cervical back: Neck supple.      Right lower leg: Tenderness present. Edema present.      Left lower leg: No tenderness. No edema.   Skin:     General: Skin is warm and dry.      Capillary Refill: Capillary refill takes less than 2 seconds.      Findings: Rash (bruising.) present.      Comments: Well-appearing surgical scar to the right anterior knee.  Mild discharge, serous.  Entirety of calf and anterior leg swollen, with pitting edema.  None left.   Neurological:      Mental Status: She is alert.   Psychiatric:         Mood and Affect: Mood normal.         Vital Signs  ED Triage Vitals   Temperature Pulse Respirations Blood Pressure SpO2   03/18/24 1307 03/18/24 1041 03/18/24 1041 03/18/24 1041 03/18/24 1041   97.6 °F (36.4 °C) (!) 111 (!) 24 (!) 179/110 92 %      Temp Source Heart Rate Source Patient Position - Orthostatic VS BP Location FiO2 (%)   03/18/24 1041 03/18/24 1041 03/18/24 1300 03/18/24 1041 --   Oral Monitor Lying Right arm       Pain Score       03/18/24 1041        3           Vitals:    03/18/24 1300 03/18/24 1307 03/18/24 1351 03/18/24 1510   BP: (!) 191/98  163/93 168/95   Pulse: 93 93 93 92   Patient Position - Orthostatic VS: Lying            Visual Acuity      ED Medications  Medications   heparin (porcine) 25,000 units in 0.45% NaCl 250 mL infusion (premix) (18 Units/kg/hr × 75 kg (Order-Specific) Intravenous New Bag 3/18/24 1400)   heparin (porcine) injection 6,000 Units (has no administration in time range)   heparin (porcine) injection 3,000 Units (has no administration in time range)   acetaminophen (TYLENOL) tablet 650 mg (650 mg Oral Given 3/18/24 1341)   iohexol (OMNIPAQUE) 350 MG/ML injection (MULTI-DOSE) 85 mL (85 mL Intravenous Given 3/18/24 1327)   heparin (porcine) injection 6,000 Units (6,000 Units Intravenous Given 3/18/24 1400)       Diagnostic Studies  Results Reviewed       Procedure Component Value Units Date/Time    HS Troponin I 2hr [[id number]]  (Normal) Collected: 03/18/24 1312    Lab Status: Final result Specimen: Blood from Arm, Right Updated: 03/18/24 1343     hs TnI 2hr 17 ng/L      Delta 2hr hsTnI -2 ng/L     HS Troponin I 4hr [[id number]]     Lab Status: No result Specimen: Blood     B-Type Natriuretic Peptide(BNP) [[id number]]  (Abnormal) Collected: 03/18/24 1047    Lab Status: Final result Specimen: Blood from Arm, Right Updated: 03/18/24 1136      pg/mL     HS Troponin 0hr (reflex protocol) [[id number]]  (Normal) Collected: 03/18/24 1047    Lab Status: Final result Specimen: Blood from Arm, Right Updated: 03/18/24 1134     hs TnI 0hr 19 ng/L     Comprehensive metabolic panel [[id number]] Collected: 03/18/24 1047    Lab Status: Final result Specimen: Blood from Arm, Right Updated: 03/18/24 1127     Sodium 139 mmol/L      Potassium 4.1 mmol/L      Chloride 105 mmol/L      CO2 25 mmol/L      ANION GAP 9 mmol/L      BUN 18 mg/dL      Creatinine 0.71 mg/dL      Glucose 105 mg/dL      Calcium 8.8 mg/dL      AST 23 U/L      ALT 27 U/L       Alkaline Phosphatase 79 U/L      Total Protein 6.5 g/dL      Albumin 3.7 g/dL      Total Bilirubin 0.91 mg/dL      eGFR 81 ml/min/1.73sq m     Narrative:      [employer] guidelines for Chronic Kidney Disease (CKD):     Stage 1 with normal or high GFR (GFR > 90 mL/min/1.73 square meters)    Stage 2 Mild CKD (GFR = 60-89 mL/min/1.73 square meters)    Stage 3A Moderate CKD (GFR = 45-59 mL/min/1.73 square meters)    Stage 3B Moderate CKD (GFR = 30-44 mL/min/1.73 square meters)    Stage 4 Severe CKD (GFR = 15-29 mL/min/1.73 square meters)    Stage 5 End Stage CKD (GFR <15 mL/min/1.73 square meters)  Note: GFR calculation is accurate only with a steady state creatinine    APTT [[id number]]  (Normal) Collected: 03/18/24 1047    Lab Status: Final result Specimen: Blood from Arm, Right Updated: 03/18/24 1123     PTT 28 seconds     Protime-INR [[id number]]  (Normal) Collected: 03/18/24 1047    Lab Status: Final result Specimen: Blood from Arm, Right Updated: 03/18/24 1123     Protime 13.5 seconds      INR 0.97    CBC and differential [[id number]]  (Abnormal) Collected: 03/18/24 1047    Lab Status: Final result Specimen: Blood from Arm, Right Updated: 03/18/24 1107     WBC 9.21 Thousand/uL      RBC 3.91 Million/uL      Hemoglobin 11.4 g/dL      Hematocrit 36.1 %      MCV 92 fL      MCH 29.2 pg      MCHC 31.6 g/dL      RDW 14.4 %      MPV 10.0 fL      Platelets 327 Thousands/uL      nRBC 0 /100 WBCs      Neutrophils Relative 59 %      Immature Grans % 1 %      Lymphocytes Relative 25 %      Monocytes Relative 12 %      Eosinophils Relative 2 %      Basophils Relative 1 %      Neutrophils Absolute 5.46 Thousands/µL      Absolute Immature Grans 0.11 Thousand/uL      Absolute Lymphocytes 2.26 Thousands/µL      Absolute Monocytes 1.09 Thousand/µL      Eosinophils Absolute 0.22 Thousand/µL      Basophils Absolute 0.07 Thousands/µL                    CTA ED chest PE Study   Final Result by Mateo Carroll MD  "(03/18 1450)      1.  Extensive bilateral pulmonary emboli involving the main arteries and lobar branches with some extending distally to the subsegmental level. No discrete areas of parenchymal infarction.         2.  The calculated ratio of right ventricular to left ventricular diameter (RV/LV ratio) is 1.14.      There is a critical finding of acute PE with RV/LV ratio >0.9. Based on PERT algorithm recommendations:    \"  Order STAT biomarkers including troponin, NT-BNP or BNP    \"  Calculate PESI score:   o  If PESI score falls in I-III, with negative biomarkers, please order a PERT priority ECHO.   o  If PESI score falls in IV-V or with positive biomarkers, please order STAT ECHO and alert the campus PERT via PAC at [phone].            I personally discussed this study with RADHA MOON JOSE CARLOS on 3/18/2024 2:46 PM.            Workstation performed: [id number]         XR chest 1 view portable   ED Interpretation by Everardo Echols DO (03/18 1316)   NAD      Final Result by Mateo Carroll MD (03/18 1450)      1.  Extensive bilateral pulmonary emboli involving the main arteries and lobar branches with some extending distally to the subsegmental level. No discrete areas of parenchymal infarction.         2.  The calculated ratio of right ventricular to left ventricular diameter (RV/LV ratio) is 1.14.      There is a critical finding of acute PE with RV/LV ratio >0.9. Based on PERT algorithm recommendations:    \"  Order STAT biomarkers including troponin, NT-BNP or BNP    \"  Calculate PESI score:   o  If PESI score falls in I-III, with negative biomarkers, please order a PERT priority ECHO.   o  If PESI score falls in IV-V or with positive biomarkers, please order STAT ECHO and alert the campus PERT via PAC at [phone].            I personally discussed this study with RADHA BRANCH on 3/18/2024 2:46 PM.            Workstation performed: [id number]         VAS lower limb venous duplex study, " unilateral/limited    (Results Pending)              Procedures  ECG 12 Lead Documentation Only    Date/Time: 3/18/2024 3:10 PM    Performed by: Josue Rivera PA-C  Authorized by: Josue Rivera PA-C    Indications / Diagnosis:  Shortness of breath  ECG reviewed by me, the ED Provider: yes    Patient location:  ED  Previous ECG:     Previous ECG:  Unavailable    Comparison to cardiac monitor: No    Interpretation:     Interpretation: abnormal    Quality:     Tracing quality:  Limited by artifact  Rate:     ECG rate:  94    ECG rate assessment: normal    Rhythm:     Rhythm: sinus rhythm    Ectopy:     Ectopy: PVCs      PVCs:  Multifocal and frequent  QRS:     QRS axis:  Normal    QRS intervals:  Normal  Conduction:     Conduction: normal    ST segments:     ST segments:  Normal  T waves:     T waves: normal             ED Course  ED Course as of 03/18/24 1512   Mon Mar 18, 2024   1351 Heparin bolus given, drip started. Patient informed of probable PE on CTA Chest. Critical care made aware of attending and my read of the CTA. Radiology contacted for stat CT read. Pesi score IV, per algorithm, echocardiogram (urgent) ordered. Will contact PERT Team via PACs   1441 Contacted by radiology, CTA showing bilateral pulmonary embolisms without any parenchymal infarct.  Was informed that patient does have right ventricular strain with a RV to LV ratio of 1.14.   1447 PACS was contacted, per team to be assembled.  Critical care updated as far as the CT findings.  Upon reevaluation patient patient continued to maintain oxygenation above 94% on nasal cannula and denies any change of any symptoms.   1451 Spoke to critical care attending, plan is to admit patient and continue heparin with continued reevaluation and follow-up possible need of IR intervention.             HEART Risk Score      Flowsheet Row Most Recent Value   Heart Score Risk Calculator    History 0 Filed at: 03/18/2024 1246   ECG 1 Filed at: 03/18/2024 1246    Age 2 Filed at: 03/18/2024 1246   Risk Factors 1 Filed at: 03/18/2024 1246   Troponin 1 Filed at: 03/18/2024 1246   HEART Score 5 Filed at: 03/18/2024 1246                              Wells' Criteria for PE      Flowsheet Row Most Recent Value   Gavin' Criteria for PE    Clinical signs and symptoms of DVT 3 Filed at: 03/18/2024 1246   PE is primary diagnosis or equally likely 3 Filed at: 03/18/2024 1246   HR >100 1.5 Filed at: 03/18/2024 1246   Immobilization at least 3 days or Surgery in the previous 4 weeks 1.5 Filed at: 03/18/2024 1246   Previous, objectively diagnosed PE or DVT 0 Filed at: 03/18/2024 1246   Hemoptysis 0 Filed at: 03/18/2024 1246   Malignancy with treatment within 6 months or palliative 0 Filed at: 03/18/2024 1246   Wells' Criteria Total 9 Filed at: 03/18/2024 1246          Gavin' Criteria for DVT      Flowsheet Row Most Recent Value   Gavin' Criteria for DVT    Active cancer Treatment or palliation within 6 months 0 Filed at: 03/18/2024 1247   Bedridden recently >3 days or major surgery within 12 weeks 1 Filed at: 03/18/2024 1247   Calf swelling >3 cm compared to the other leg 1 Filed at: 03/18/2024 1247   Entire leg swollen 1 Filed at: 03/18/2024 1247   Collateral (nonvaricose) superficial veins present 0 Filed at: 03/18/2024 1247   Localized tenderness along the deep venous system 1 Filed at: 03/18/2024 1247   Pitting edema, confined to symptomatic leg 1 Filed at: 03/18/2024 1247   Paralysis, paresis, or recent plaster immobilization of the lower extremity 0 Filed at: 03/18/2024 1247   Previously documented DVT --   Alternative diagnosis to DVT as likely or more likely 0 Filed at: 03/18/2024 1247   Gavin DVT Critera Score 5 Filed at: 03/18/2024 1247                Medical Decision Making  78-year-old female presents today with concerns of shortness of breath and right calf swelling and pain.  States that she feels just breathless and is not able to catch her breath.  No chest pain and no  chest tightness.  No back pain or abdominal pain.  Physical exam consistent with high suspicion of pulmonary embolism secondary to history of knee surgery, immobilization, no blood thinners.  Lab workup done in the waiting room.  Elevated BNP.  Patient has no history of congestive heart failure or heart failure that was diagnosed at any time.  Mild troponin elevation at 19, will trend.  CBC within normal limits aside from mild decrease in hemoglobin at 11.4.  Coags normal.  CMP within normal limits as well.  Kidney function stable.  Wells score for DVT and PE calculated, PE 9, DVT 5.  Pasi score 119.  Patient at one point was 88% on room air.  Patient was started on oxygen therapy, nasal cannula at 3 L/min by me and is now maintaining 96%.  Goal oxygen over 94%.  CTA chest.  On my read, multiple pulmonary emboli present. Heparin started with bolus and drip. Reached out to CC team, will keep updated regarding CT read.  Discussed with critical care team and PERT team.  Plan to keep patient stepdown 1 for observation and heparin drip.  No transfer, thrombolytics or clot retrieval at this time.  Patient admitted to critical care, stepdown 1, with Dr. Alves.  Patient at time of admission was stable.    Amount and/or Complexity of Data Reviewed  Labs: ordered.  Radiology: ordered.    Risk  OTC drugs.        PESI  Age: 78 years old  Sex: 0  History of Cancer: 0  History of Heart Failure: 0  History of Chronic Lung Disease: 0  Heart rate greater than or equal to 110: 20  Systolic BP < 100 mmH  Respiratory rate greater than or equal to 30: 0  Temperature <36°C/96.8°F: 0  Altered Mental Status (Disorientation, lethargy, stupor, or coma): 0  O2 saturation <90%: 20  PESI Score Results: 119        Disposition  Final diagnoses:   Hypoxia   Right leg swelling   Elevated brain natriuretic peptide (BNP) level   Pulmonary embolism (HCC)     Time reflects when diagnosis was documented in both MDM as applicable and the  Disposition within this note       Time User Action Codes Description Comment    3/18/2024  1:18 PM Everardo Echols [place] [R09.02] Hypoxia     3/18/2024  1:39 PM Josue Rivera [M79.89] Right leg swelling     3/18/2024  1:41 PM Josue iRvera [R79.89] Elevated brain natriuretic peptide (BNP) level     3/18/2024  2:54 PM Josue Rivera [I26.99] Pulmonary embolism (HCC)           ED Disposition       ED Disposition   Admit    Condition   Stable    Date/Time   Mon Mar 18, 2024 0685    Comment   Case was discussed with Dr. Alves and the patient's admission status was agreed to be Admission Status: inpatient status to the service of Dr. Alves.               Follow-up Information    None         Patient's Medications   Discharge Prescriptions    No medications on file       No discharge procedures on file.    PDMP Review       None            ED Provider  Electronically Signed by             Josue Rivera PA-C  03/18/24 2270

## 2024-03-18 NOTE — ED PROCEDURE NOTE
Procedure  POC Cardiac US    Date/Time: 3/18/2024 4:08 PM    Performed by: Luz Marina Spangler MD  Authorized by: Luz Marina Spangler MD    Patient location:  ED  Procedure details:     Exam Type:  Educational and diagnostic    Assessment / Evaluation for: cardiac function and right heart strain (suspected pulmonary embolism)      Exam Type: initial exam      Image quality: limited diagnostic      Image availability:  Video obtained  Patient Details:     Cardiac Rhythm:  Regular    Mechanical ventilation: No    Cardiac findings:     Views obtained: parasternal long axis, parasternal short axis, subcostal and apical      Pericardial effusion: absent      LV systolic function: normal      RV dilation: moderate    Pulmonary findings:     Left Lung Findings: left lung sliding      Right lung findings: right lung sliding      B-lines: no B-lines present                     Luz Marina Spangler MD  03/18/24 5759

## 2024-03-18 NOTE — ASSESSMENT & PLAN NOTE
3/18: Venous duplex notable for evidence of acute nonocclusive DVT in the R posterior tibial and peroneal veins. Well-defined hypoechoic nonvascular cystic type structure, 5.6 cm noted in the popliteal fossa.

## 2024-03-18 NOTE — PROGRESS NOTES
Critical Care Attending Note    78 year old woman with HLP and OA, presented 1 week after right TKR for increasing dyspnea. Found to have submassive VTE and RLE DVT. Started on UFH gtt in ED.  PERT alert called and plan for systemic AC for now and monitoring.  She denied chest pain, no pleurisy, no hemoptysis, no syncope or near syncopal events, notes RLE edema and pain.      Family history - father possible death via PE age 51, no other family history of VTE    Nonsmoker, no ETOH    VS Reviewed   Exam  GEN older obese woman in bed, conversant, nontoxic  HEENT ATNC, MMM, no thrush  NECK no accessory muscle use  CV reg, single s1/2, no m/r, no RV heave  Pulm clear, no wheeze, no rales, no pleural rubs  ABD +BS soft NTND, no rebound  EXT warm, brisk cap refill, RLE with 2+ LE edema, ecchymosis and sutures in place w/o purulence     Laboratory and Diagnostics  Results from last 7 days   Lab Units 03/18/24  1047   WBC Thousand/uL 9.21   HEMOGLOBIN g/dL 11.4*   HEMATOCRIT % 36.1   PLATELETS Thousands/uL 327   NEUTROS PCT % 59   MONOS PCT % 12   EOS PCT % 2     Results from last 7 days   Lab Units 03/18/24  1047 03/13/24  0419   SODIUM mmol/L 139 141   POTASSIUM mmol/L 4.1 4.2   CHLORIDE mmol/L 105 107   CO2 mmol/L 25 27   ANION GAP mmol/L 9 7   BUN mg/dL 18 17   CREATININE mg/dL 0.71 0.69   CALCIUM mg/dL 8.8 8.5   GLUCOSE RANDOM mg/dL 105 97   ALT U/L 27  --    AST U/L 23  --    ALK PHOS U/L 79  --    ALBUMIN g/dL 3.7  --    TOTAL BILIRUBIN mg/dL 0.91  --           Results from last 7 days   Lab Units 03/18/24  1047   INR  0.97   PTT seconds 28      , HS Trop 19    MICRO  none    RADIOGRAPHS - images personally reviewed  CT angio 3/18 - bilateral PEs with thin strand saddle PE, RV/LV 1.14, no effusions, basilar atelectasis    TTE 3/2024 EF 65%, grade I diastolic dysfunction, RV dilated poor visualization, RVSP 52, normal TAPSE    LE US 3/18 - nonocclusive thrombus post tibial and peroneal veins, neg for LLE  DVT    Assessment   Acute hypoxic respiratory failure  Provoke submassive saddle PE and RLE DVT  Mild RV dysfunction  Recent right total knee replacement    PLAN  PERT team discussion, hold on catheter directed thrombectomy at this time - PESI 99 - class III, patient in agreement  Admit SDU 1 status with [facility]  Follow HD status closely  Continue UFH gtt for now  Should clinical decline occur, re-engage PERT team for possible thrombectomy  Consider ortho consult for wound management in AM  Given current stability will allow CLD   updated at bedside and all questions answered    Critical care time excluding procedures, teaching and updates is 0 minutes.    Geraldo Alves, DO

## 2024-03-19 PROBLEM — I48.92 ATRIAL FLUTTER, PAROXYSMAL (HCC): Status: ACTIVE | Noted: 2024-03-19

## 2024-03-19 LAB
ANION GAP SERPL CALCULATED.3IONS-SCNC: 6 MMOL/L (ref 4–13)
APTT PPP: 82 SECONDS (ref 23–37)
ATRIAL RATE: 300 BPM
ATRIAL RATE: 340 BPM
BUN SERPL-MCNC: 11 MG/DL (ref 5–25)
CALCIUM SERPL-MCNC: 8.2 MG/DL (ref 8.4–10.2)
CHLORIDE SERPL-SCNC: 107 MMOL/L (ref 96–108)
CO2 SERPL-SCNC: 27 MMOL/L (ref 21–32)
CREAT SERPL-MCNC: 0.63 MG/DL (ref 0.6–1.3)
ERYTHROCYTE [DISTWIDTH] IN BLOOD BY AUTOMATED COUNT: 14.5 % (ref 11.6–15.1)
GFR SERPL CREATININE-BSD FRML MDRD: 85 ML/MIN/1.73SQ M
GLUCOSE SERPL-MCNC: 89 MG/DL (ref 65–140)
HCT VFR BLD AUTO: 33.5 % (ref 34.8–46.1)
HGB BLD-MCNC: 10.6 G/DL (ref 11.5–15.4)
MCH RBC QN AUTO: 29 PG (ref 26.8–34.3)
MCHC RBC AUTO-ENTMCNC: 31.6 G/DL (ref 31.4–37.4)
MCV RBC AUTO: 92 FL (ref 82–98)
P AXIS: 263 DEGREES
PLATELET # BLD AUTO: 327 THOUSANDS/UL (ref 149–390)
PMV BLD AUTO: 9.7 FL (ref 8.9–12.7)
POTASSIUM SERPL-SCNC: 4.1 MMOL/L (ref 3.5–5.3)
QRS AXIS: 63 DEGREES
QRS AXIS: 65 DEGREES
QRSD INTERVAL: 72 MS
QRSD INTERVAL: 74 MS
QT INTERVAL: 312 MS
QT INTERVAL: 314 MS
QTC INTERVAL: 465 MS
QTC INTERVAL: 496 MS
RBC # BLD AUTO: 3.65 MILLION/UL (ref 3.81–5.12)
SODIUM SERPL-SCNC: 140 MMOL/L (ref 135–147)
T WAVE AXIS: -63 DEGREES
T WAVE AXIS: 56 DEGREES
VENTRICULAR RATE: 132 BPM
VENTRICULAR RATE: 152 BPM
WBC # BLD AUTO: 9.68 THOUSAND/UL (ref 4.31–10.16)

## 2024-03-19 PROCEDURE — 93005 ELECTROCARDIOGRAM TRACING: CPT

## 2024-03-19 PROCEDURE — 85027 COMPLETE CBC AUTOMATED: CPT | Performed by: NURSE PRACTITIONER

## 2024-03-19 PROCEDURE — 93010 ELECTROCARDIOGRAM REPORT: CPT | Performed by: INTERNAL MEDICINE

## 2024-03-19 PROCEDURE — 99232 SBSQ HOSP IP/OBS MODERATE 35: CPT | Performed by: INTERNAL MEDICINE

## 2024-03-19 PROCEDURE — 80048 BASIC METABOLIC PNL TOTAL CA: CPT | Performed by: NURSE PRACTITIONER

## 2024-03-19 PROCEDURE — 85730 THROMBOPLASTIN TIME PARTIAL: CPT | Performed by: NURSE PRACTITIONER

## 2024-03-19 PROCEDURE — 97163 PT EVAL HIGH COMPLEX 45 MIN: CPT

## 2024-03-19 RX ORDER — LIDOCAINE 50 MG/G
1 PATCH TOPICAL DAILY
Status: DISCONTINUED | OUTPATIENT
Start: 2024-03-19 | End: 2024-03-21 | Stop reason: HOSPADM

## 2024-03-19 RX ORDER — ACETAMINOPHEN 325 MG/1
975 TABLET ORAL EVERY 6 HOURS PRN
Status: DISCONTINUED | OUTPATIENT
Start: 2024-03-19 | End: 2024-03-21 | Stop reason: HOSPADM

## 2024-03-19 RX ORDER — PANTOPRAZOLE SODIUM 40 MG/1
40 TABLET, DELAYED RELEASE ORAL
Status: DISCONTINUED | OUTPATIENT
Start: 2024-03-19 | End: 2024-03-21 | Stop reason: HOSPADM

## 2024-03-19 RX ORDER — KETOROLAC TROMETHAMINE 30 MG/ML
15 INJECTION, SOLUTION INTRAMUSCULAR; INTRAVENOUS ONCE
Status: COMPLETED | OUTPATIENT
Start: 2024-03-19 | End: 2024-03-19

## 2024-03-19 RX ORDER — HYDROMORPHONE HCL IN WATER/PF 6 MG/30 ML
0.2 PATIENT CONTROLLED ANALGESIA SYRINGE INTRAVENOUS EVERY 2 HOUR PRN
Status: DISCONTINUED | OUTPATIENT
Start: 2024-03-19 | End: 2024-03-21 | Stop reason: HOSPADM

## 2024-03-19 RX ORDER — OXYCODONE HYDROCHLORIDE 5 MG/1
5 TABLET ORAL EVERY 4 HOURS PRN
Status: DISCONTINUED | OUTPATIENT
Start: 2024-03-19 | End: 2024-03-21 | Stop reason: HOSPADM

## 2024-03-19 RX ADMIN — Medication 2.5 MG: at 08:05

## 2024-03-19 RX ADMIN — LIDOCAINE 5% 1 PATCH: 700 PATCH TOPICAL at 08:07

## 2024-03-19 RX ADMIN — CHLORHEXIDINE GLUCONATE 15 ML: 1.2 SOLUTION ORAL at 08:07

## 2024-03-19 RX ADMIN — HEPARIN SODIUM 18 UNITS/KG/HR: 10000 INJECTION, SOLUTION INTRAVENOUS at 08:14

## 2024-03-19 RX ADMIN — KETOROLAC TROMETHAMINE 15 MG: 30 INJECTION, SOLUTION INTRAMUSCULAR; INTRAVENOUS at 12:20

## 2024-03-19 RX ADMIN — CHLORHEXIDINE GLUCONATE 15 ML: 1.2 SOLUTION ORAL at 21:49

## 2024-03-19 RX ADMIN — PANTOPRAZOLE SODIUM 40 MG: 40 TABLET, DELAYED RELEASE ORAL at 12:25

## 2024-03-19 RX ADMIN — ACETAMINOPHEN 975 MG: 325 TABLET, FILM COATED ORAL at 22:29

## 2024-03-19 NOTE — PLAN OF CARE
Problem: PAIN - ADULT  Goal: Verbalizes/displays adequate comfort level or baseline comfort level  Description: Interventions:  - Encourage patient to monitor pain and request assistance  - Assess pain using appropriate pain scale  - Administer analgesics based on type and severity of pain and evaluate response  - Implement non-pharmacological measures as appropriate and evaluate response  - Consider cultural and social influences on pain and pain management  - Notify physician/advanced practitioner if interventions unsuccessful or patient reports new pain  Outcome: Progressing     Problem: INFECTION - ADULT  Goal: Absence or prevention of progression during hospitalization  Description: INTERVENTIONS:  - Assess and monitor for signs and symptoms of infection  - Monitor lab/diagnostic results  - Monitor all insertion sites, i.e. indwelling lines, tubes, and drains  - Monitor endotracheal if appropriate and nasal secretions for changes in amount and color  - Thayer appropriate cooling/warming therapies per order  - Administer medications as ordered  - Instruct and encourage patient and family to use good hand hygiene technique  - Identify and instruct in appropriate isolation precautions for identified infection/condition  Outcome: Progressing  Goal: Absence of fever/infection during neutropenic period  Description: INTERVENTIONS:  - Monitor WBC    Outcome: Progressing     Problem: SAFETY ADULT  Goal: Patient will remain free of falls  Description: INTERVENTIONS:  - Educate patient/family on patient safety including physical limitations  - Instruct patient to call for assistance with activity   - Consult OT/PT to assist with strengthening/mobility   - Keep Call bell within reach  - Keep bed low and locked with side rails adjusted as appropriate  - Keep care items and personal belongings within reach  - Initiate and maintain comfort rounds  - Make Fall Risk Sign visible to staff  - Apply yellow socks and bracelet  for high fall risk patients  - Consider moving patient to room near nurses station  Outcome: Progressing  Goal: Maintain or return to baseline ADL function  Description: INTERVENTIONS:  -  Assess patient's ability to carry out ADLs; assess patient's baseline for ADL function and identify physical deficits which impact ability to perform ADLs (bathing, care of mouth/teeth, toileting, grooming, dressing, etc.)  - Assess/evaluate cause of self-care deficits   - Assess range of motion  - Assess patient's mobility; develop plan if impaired  - Assess patient's need for assistive devices and provide as appropriate  - Encourage maximum independence but intervene and supervise when necessary  - Involve family in performance of ADLs  - Assess for home care needs following discharge   - Consider OT consult to assist with ADL evaluation and planning for discharge  - Provide patient education as appropriate  Outcome: Progressing  Goal: Maintains/Returns to pre admission functional level  Description: INTERVENTIONS:  - Perform AM-PAC 6 Click Basic Mobility/ Daily Activity assessment daily.  - Set and communicate daily mobility goal to care team and patient/family/caregiver.   - Collaborate with rehabilitation services on mobility goals if consulted  - Out of bed for toileting  - Record patient progress and toleration of activity level   Outcome: Progressing     Problem: DISCHARGE PLANNING  Goal: Discharge to home or other facility with appropriate resources  Description: INTERVENTIONS:  - Identify barriers to discharge w/patient and caregiver  - Arrange for needed discharge resources and transportation as appropriate  - Identify discharge learning needs (meds, wound care, etc.)  - Arrange for interpretive services to assist at discharge as needed  - Refer to Case Management Department for coordinating discharge planning if the patient needs post-hospital services based on physician/advanced practitioner order or complex needs  related to functional status, cognitive ability, or social support system  Outcome: Progressing     Problem: Knowledge Deficit  Goal: Patient/family/caregiver demonstrates understanding of disease process, treatment plan, medications, and discharge instructions  Description: Complete learning assessment and assess knowledge base.  Interventions:  - Provide teaching at level of understanding  - Provide teaching via preferred learning methods  Outcome: Progressing     Problem: Prexisting or High Potential for Compromised Skin Integrity  Goal: Skin integrity is maintained or improved  Description: INTERVENTIONS:  - Identify patients at risk for skin breakdown  - Assess and monitor skin integrity  - Assess and monitor nutrition and hydration status  - Monitor labs   - Assess for incontinence   - Turn and reposition patient  - Assist with mobility/ambulation  - Relieve pressure over bony prominences  - Avoid friction and shearing  - Provide appropriate hygiene as needed including keeping skin clean and dry  - Evaluate need for skin moisturizer/barrier cream  - Collaborate with interdisciplinary team   - Patient/family teaching  - Consider wound care consult   Outcome: Progressing

## 2024-03-19 NOTE — PHYSICAL THERAPY NOTE
Physical Therapy Evaluation    Patient's Name: Stormy Hermosillo    Admitting Diagnosis  Pulmonary embolism (HCC) [I26.99]  Hypoxia [R09.02]  Elevated brain natriuretic peptide (BNP) level [R79.89]  Right leg swelling [M79.89]    Problem List  Patient Active Problem List   Diagnosis    Symptomatic varicose veins of both lower extremities    Venous insufficiency of both lower extremities    Right hand pain    Primary osteoarthritis of right hand    Localized primary osteoarthritis of carpometacarpal (CMC) joint of right wrist    Unilateral primary osteoarthritis, right knee    History of total knee arthroplasty, right    Pulmonary embolism, bilateral (HCC)    GERD (gastroesophageal reflux disease)    Hyperlipidemia    Acute deep vein thrombosis (DVT) of distal vein of right lower extremity (HCC)    Atrial flutter, paroxysmal (HCC)       Past Medical History  History reviewed. No pertinent past medical history.    Past Surgical History  Past Surgical History:   Procedure Laterality Date    BACK SURGERY      BREAST SURGERY      FOOT SURGERY      HIP SURGERY Left     KNEE SURGERY Left         03/19/24 1316   Note Type   Note type Evaluation   Pain Assessment   Pain Assessment Tool 0-10   Pain Score 5  (1-2/10 post session)   Pain Location/Orientation Orientation: Left;Location: Rib Cage   Effect of Pain on Daily Activities limits comfort   Multiple Pain Sites No   Restrictions/Precautions   Weight Bearing Precautions Per Order Yes   RLE Weight Bearing Per Order   (pt is POD#7 R TKA performed by Dr. Xuan WASHINGTON)   Other Precautions Chair Alarm;Bed Alarm;O2;Fall Risk;Multiple lines;Telemetry  (4L O2)   Home Living   Type of Home House   Home Layout One level  (1 DENISE)   Bathroom Shower/Tub Walk-in shower   Bathroom Toilet Standard  (W/ RAISED ATTACHMENT)   Bathroom Equipment Grab bars in shower;Shower chair   Home Equipment Walker;Cane;Wheelchair-manual;Crutches   Prior Function   Level of [place] Independent  with functional mobility;Independent with ADLs   Lives With Spouse   IADLs Independent with driving;Independent with meal prep;Independent with medication management   Falls in the last 6 months 0   Comments at true baseline ambulates without AD, pt currently ambulating with a hurrycane s/p TKR   General   Family/Caregiver Present No   Cognition   Orientation Level Oriented X4   Following Commands Follows one step commands without difficulty   Comments pt ID by wristband, name and    Subjective   Subjective pt agreeable to PT evaluation   RLE Assessment   RLE Assessment X  (3+/5 functionally, anterior surgical incision)   LLE Assessment   LLE Assessment X  (3+/5 functionally)   Light Touch   RLE Light Touch Grossly intact   LLE Light Touch Grossly intact   Bed Mobility   Supine to Sit 5  Supervision   Additional items HOB elevated;Increased time required   Sit to Supine Unable to assess   Additional Comments pt with mild c/o of left sided rib cage/flank discomfort   Transfers   Sit to Stand 5  Supervision   Additional items Assist x 1;Increased time required;Verbal cues   Stand to Sit 5  Supervision   Additional items Assist x 1;Increased time required;Verbal cues   Additional Comments denies light headedness/SOB with transitional movements   Ambulation/Elevation   Gait pattern Decreased foot clearance;Short stride;Knees flexed;Decreased heel strike;Decreased hip extension   Gait Assistance 4  Minimal assist  (CGA)   Additional items Assist x 1   Assistive Device   (personal hurrycane)   Distance 5'x1   Ambulation/Elevation Additional Comments GIL with short ambulation, deffered further distance secondary to SOB   Balance   Static Sitting Fair +   Dynamic Sitting Fair   Static Standing Fair -   Dynamic Standing Fair -   Ambulatory Poor +  (hurrycane)   Activity Tolerance   Activity Tolerance Patient limited by fatigue   Medical Staff Made Aware spoke with OT, spoke with CM   Nurse Made Aware ALIYA moscoso pre/post    Assessment   Prognosis Fair   Problem List Decreased strength;Decreased endurance;Decreased mobility;Impaired balance;Impaired judgement;Decreased safety awareness;Obesity;Decreased skin integrity;Orthopedic restrictions;Pain   Assessment Pt is a 78 y.o. female seen for PT evaluation s/p admit to [facility] on [facility]/18/[facility]24. Pt was admitted with a primary dx of: pulmonary embolism, bilateral.  PT now consulted for assessment of mobility and d/c needs. Pt with OOB to chair orders.  Pts current comorbidities and personal factors effecting treatment include: BMI, GERD, HLD, recent R TKR. Pts current clinical presentation is Unstable/Unpredictable (high complexity) due to Ongoing medical management for primary dx, Increased reliance on more restrictive AD compared to baseline, Decreased activity tolerance compared to baseline, Fall risk, Increased assistance needed from caregiver at current time, Ongoing telemetry monitoring, Increased O2 via NC from pts baseline, s/p surgical intervention. Prior to admission, pt was independent with use of SBQC. Upon evaluation, pt currently is requiring Supervision for bed mobility; Supervision for transfers and CGA for ambulation 5 ft w/ NBQC. Pt presents at PT eval functioning below baseline and currently w/ overall mobility deficits 2* to: BLE weakness, impaired balance, decreased endurance, gait deviations, pain, decreased activity tolerance compared to baseline, decreased functional mobility tolerance compared to baseline, decreased safety awareness, impaired judgement, fall risk, orthopedic restrictions, decreased skin integrity. Pt currently at a fall risk 2* to impairments listed above.  Pt will continue to benefit from skilled acute PT interventions to address stated impairments; to maximize functional mobility; for ongoing pt/ family training; and DME needs. At conclusion of PT session chair alarm engaged, all needs in reach, RN notified of session  findings/recommendations, and pt returned back in recliner chair with phone and call bell within reach. Pt denies any further questions at this time. Recommend Level III (Minimum Resource Intensity)  upon hospital D/C.   Goals   Patient Goals to ge better   [facility] Expiration Date 03/29/24   Short Term Goal #1 In 10 days pt will be able to: 1. Demonstrate ability to perform all aspects of bed mobility independently to improve functional safety.  2. Perform functional transfers independently to facilitate safe return to previous living environment.  3.  Ambulate 150 ft with LRAD independently with stable vitals to improve safety with household distances and reduce fall risk.  4. Improve LE strength grades by 1 to increase ease of functional mobility with transfers and gait. 5. Pt will demonstrate improved balance by one grade in order to decrease risk of falls. 6. Climb at least 1 steps with 1 HR independently to simulate entrance to home.   Plan   Treatment/Interventions Functional transfer training;LE strengthening/ROM;Elevations;Therapeutic exercise;Cognitive reorientation;Patient/family training;Equipment eval/education;Bed mobility;Gait training;Spoke to nursing;Spoke to case management;OT   PT Frequency 2-3x/wk   Discharge Recommendation   Rehab Resource Intensity Level, PT III (Minimum Resource Intensity)   AM-PAC Basic Mobility Inpatient   Turning in Flat Bed Without Bedrails 3   Lying on Back to Sitting on Edge of Flat Bed Without Bedrails 3   Moving Bed to Chair 3   Standing Up From Chair Using Arms 3   Walk in Room 3   Climb 3-5 Stairs With Railing 3   Basic Mobility Inpatient Raw Score 18   Basic Mobility Standardized Score 41.05   [facility] Highest Level Of Mobility   -HLM Goal 6: Walk 10 steps or more   -HLM Achieved 4: Move to chair/commode  (limited by SOB)   End of Consult   Patient Position at End of Consult Bedside chair;Bed/Chair alarm activated;All needs within reach   The patient's AM-PAC  Basic Mobility Inpatient Short Form Raw Score is 18. A Raw score of greater than 16 suggests the patient may benefit from discharge to home. Please also refer to the recommendation of the Physical Therapist for safe discharge planning.  Grant Starkey PT

## 2024-03-19 NOTE — UTILIZATION REVIEW
Initial Clinical Review    Admission: Date/Time/Statement:   Admission Orders (From admission, onward)       Ordered        03/18/24 1506  INPATIENT ADMISSION  Once                          Orders Placed This Encounter   Procedures    INPATIENT ADMISSION     Standing Status:   Standing     Number of Occurrences:   1     Order Specific Question:   Level of Care     Answer:   Level 1 Stepdown [13]     Order Specific Question:   Estimated length of stay     Answer:   More than 2 Midnights     Order Specific Question:   Certification     Answer:   I certify that inpatient services are medically necessary for this patient for a duration of greater than two midnights. See H&P and MD Progress Notes for additional information about the patient's course of treatment.     ED Arrival Information       Expected   -    Arrival   3/18/2024 10:25    Acuity   Emergent              Means of arrival   Walk-In    Escorted by   Spouse    Service   Critical Care/ICU    Admission type   Emergency              Arrival complaint   Severe SOB/Swollen leg             Chief Complaint   Patient presents with    Shortness of Breath     Pt c/o SOB over the last few days. Had right knee replacement last week. Reports significant swelling and warmth in right leg. Denies CP.        Initial Presentation: 78 y.o. female  female to ED via walk in from home.    Admitted to inpatient with Dx: Bilateral pulmonary embolus/S/p right total knee arthroplasty on 3/12/2024/acute DVT.  Presented to ED with shortness of breath starting about 2 days prior to arrival, can't catch her breath. + pain RLE, distal to knee.  Knee draining clear fluid.     Felt worse after taking oxycodone for pain.   On baby asa.   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 On exam: acute distress.  Tachypnea.  RLE tenderness and edema.   Well-appearing surgical scar to the right anterior knee.  Mild discharge,  serous.  Entirety of calf and anterior leg swollen, with pitting edema.  .     Imaging shows extensive PE.   The calculated ratio of right ventricular to left ventricular diameter (RV/LV ratio) is 1.14.   PESI score 4.  Venous duplex notable for evidence of acute nonocclusive DVT in the R posterior tibial and peroneal veins. Well-defined hypoechoic nonvascular cystic type structure, 5.6 cm noted in the popliteal fossa   ED treatment : placed on oxygen,  drop to 88% room air, sat 91% on 3 liters.   Given bolus of Heparin and started on drip .    Plan includes admit to stepdown 1, continue heparin drip. No transfer, thrombolytics or clot retrieval at this time.  echo .     Date: 3/19/24    Day 2:  overnight PAF lasting 1 to 2 minutes.  Today telemetry sinus tachycardia and PVCs.  Has pain on the left mid ribs with deep breathing.  + shortness of breath and chills.   On exam:  Healing surgical incisions on the right anterior knee, without erythema or drainage. Healing wound on the anterior knee. Right lower extremity with erythematous, shiny, smooth swollen up to the knees. Ecchymosis in the medial and lateral aspect of the ankle .  RLE + 2 edema.   H&H 10.6/33.5.   Continue Heparin drip.  Pain control.  Monitor oxygen sat.     Patient has crossed 3 midnights and requires ongoing care    3/20/2024 .  Patient presents with   episode of atrial flutter on telemetry similar to last night.     On exam:  + 2 RLE edema. Healing surgical incisions on the right anterior knee, without erythema or drainage. Healing blister on the anterior knee. Right lower extremity with erythematous, shiny, smooth swollen up to the knees. Ecchymosis in the medial and lateral aspect of the ankle.  Telemetry intermittent episodes of atrial flutter. Tachypnea on oxygen.     Abnormal labs or imaging:  wbc 10.89.  H&H 10.5/33.1.     Diagnosis/Plan    Bilateral PE/Acute DVT RLE/PAF/S/p right total knee arthroplasty on 3/12/2024.  Continue  Heparin drip and transition to Eliquis.  Telemetry.  PT/OT.  Pain control.   Wean oxygen as able.     ED Triage Vitals   Temperature Pulse Respirations Blood Pressure SpO2   03/18/24 1307 03/18/24 1041 03/18/24 1041 03/18/24 1041 03/18/24 1041   97.6 °F (36.4 °C) (!) 111 (!) 24 (!) 179/110 92 %      Temp Source Heart Rate Source Patient Position - Orthostatic VS BP Location FiO2 (%)   03/18/24 1041 03/18/24 1041 03/18/24 1300 03/18/24 1041 --   Oral Monitor Lying Right arm       Pain Score       03/18/24 1041       3          Wt Readings from Last 1 Encounters:   03/18/24 81.6 kg (179 lb 14.3 oz)     Additional Vital Signs:   3/20/24 1000 -- 92 25 Abnormal  -- -- -- -- -- -- --   03/20/24 0900 -- 93 34 Abnormal  -- -- 90 % -- -- -- --   03/20/24 0840 -- -- -- -- -- 91 % 28 2 L/min Nasal cannula --   03/20/24 0800 -- 80 24 Abnormal  -- -- 93 % 36 4 L/min Nasal cannula --   03/20/24 0722 98.9 °F (37.2 °C) -- -- 164/77 109 -- -- -- -- --   03/20/24 0700 -- 78 19 -- -- -- -- -- -- --   03/20/24 0600 -- 85 27 Abnormal  -- -- 94 % -- -- -- --   03/20/24 0505 -- 79 27 Abnormal  134/75 96 95 % -- -- -- --   03/20/24 0500 -- 80 25 Abnormal  -- -- 95 % -- -- -- --   03/20/24 0400 -- 76 19 -- -- 96 % 36 4 L/min Nasal cannula --   03/20/24 0300 -- 79 19 -- -- 93 % -- -- -- --   03/20/24 0200 -- 81 25 Abnormal  -- -- 95 % -- -- -- --   03/20/24 0100 -- 83 21 -- -- 92 % -- -- -- --   03/20/24 0000 -- 80 19 -- -- 94 % 36 4 L/min Nasal cannula      03/19/24 0700 98.5 °F (36.9 °C) 93 24 Abnormal  158/80 93 92 % 36 4 L/min Nasal cannula Lying   03/19/24 0600 -- 83 19 -- -- 90 % -- -- -- --   03/19/24 0500 -- 85 30 Abnormal  -- -- 91 % -- -- -- --   03/19/24 0400 -- 85 19 -- -- 90 % 36 4 L/min Nasal cannula --   03/19/24 0300 -- 87 19 -- -- 91 % -- -- -- --   03/19/24 0230 98.8 °F (37.1 °C) 88 19 123/73 90 90 % -- -- Nasal cannula Lying   03/19/24 0200 -- 87 34 Abnormal  -- -- -- -- -- -- --   03/19/24 0100 -- 94 25 Abnormal  -- -- 91  % -- -- -- --   03/19/24 0000 -- 93 25 Abnormal  -- -- 90 % 36 4 L/min Nasal cannula --   03/18/24 2300 -- 96 27 Abnormal  -- -- 93 % -- -- -- --   03/18/24 2223 98.9 °F (37.2 °C) 92 38 Abnormal  143/84 109 89 % Abnormal  -- -- Nasal cannula Lying   03/18/24 2200 -- 92 26 Abnormal  168/88 121 88 % Abnormal  -- -- -- --   03/18/24 2000 -- 94 25 Abnormal  147/78 105 92 % 32 3 L/min Nasal cannula --   03/18/24 1948 -- 164 Abnormal  20 152/85 109 85 % Abnormal  -- -- -- --   03/18/24 1945 -- 164 Abnormal  38 Abnormal  190/106 Abnormal  121 -- -- -- -- --   03/18/24 1915 -- 94 -- 170/83 117 -- -- -- -- --   03/18/24 1745 98 °F (36.7 °C) 94 22 166/92 122 94 % 32 3 L/min Nasal cannula Lying   03/18/24 1630 -- 85 22 143/85 112 92 % 32 3 L/min Nasal cannula Lying   03/18/24 1510 -- 92 22 168/95 126 96 % 32 3 L/min Nasal cannula --   03/18/24 1307 97.6 °F (36.4 °C) 93 -- -- -- 94 % 36 4 L/min Nasal cannula --   03/18/24 1300 -- 93 22 191/98 Abnormal  137 91 % 32 3 L/min Nasal cannula      Pertinent Labs/Diagnostic Test Results:   VAS lower limb venous duplex study, unilateral/limited   Final Result by Valdo Henley DO (03/18 1601)   RIGHT LOWER LIMB  Evidence of acute non-occlusive deep vein thrombosis noted in the paired  posterior tibial and peroneal veins.  No evidence of superficial thrombophlebitis noted.  Doppler evaluation shows a normal response to augmentation maneuvers.  Popliteal, posterior tibial and anterior tibial arterial Doppler waveforms are  triphasic.  There is a well defined hypoechoic non-vascularized cystic-type structure, 5.6  cm noted in the popliteal fossa.     LEFT LOWER LIMB LIMITED  Evaluation shows no evidence of thrombus in the common femoral vein.  Doppler evaluation shows a normal response to augmentation maneuvers   CTA ED chest PE Study   Final Result by Mateo Carroll MD (03/18 2699)      1.  Extensive bilateral pulmonary emboli involving the main arteries and lobar branches with  "some extending distally to the subsegmental level. No discrete areas of parenchymal infarction.         2.  The calculated ratio of right ventricular to left ventricular diameter (RV/LV ratio) is 1.14.      There is a critical finding of acute PE with RV/LV ratio >0.9. Based on PERT algorithm recommendations:    \"  Order STAT biomarkers including troponin, NT-BNP or BNP    \"  Calculate PESI score:   o  If PESI score falls in I-III, with negative biomarkers, please order a PERT priority ECHO.   o  If PESI score falls in IV-V or with positive biomarkers, please order STAT ECHO and alert the campus PERT via PAC at [phone].            I personally discussed this study with RADHA MOON JOSE CARLOS on 3/18/2024 2:46 PM.            Workstation performed: [id number]         XR chest 1 view portable   ED Interpretation by Everardo Echols DO (03/18 1316)   NAD      Final Result by Mateo Carroll MD (03/18 6458)      1.  Extensive bilateral pulmonary emboli involving the main arteries and lobar branches with some extending distally to the subsegmental level. No discrete areas of parenchymal infarction.         2.  The calculated ratio of right ventricular to left ventricular diameter (RV/LV ratio) is 1.14.      There is a critical finding of acute PE with RV/LV ratio >0.9. Based on PERT algorithm recommendations:    \"  Order STAT biomarkers including troponin, NT-BNP or BNP    \"  Calculate PESI score:   o  If PESI score falls in I-III, with negative biomarkers, please order a PERT priority ECHO.   o  If PESI score falls in IV-V or with positive biomarkers, please order STAT ECHO and alert the campus PERT via PAC at [phone].            I personally discussed this study with RADHA MOON JOSE CARLOS on 3/18/2024 2:46 PM.            Workstation performed: [id number]           3/18/24 ecg Sinus rhythm with frequent Premature ventricular complexes  Rightward axis  Abnormal ECG  No previous ECGs available    3/18/24 echo  Left Ventricle: " Left ventricular cavity size is normal. Wall thickness is mildly increased. The left ventricular ejection fraction is 65%. Systolic function is normal. Diastolic function is mildly abnormal, consistent with grade I (abnormal) relaxation.    Right Ventricle: Right ventricle is not well visualized. Right ventricular cavity size is dilated. Visually estimated systolic function is mildly reduced.  There is free wall hypokinesis.  However there is normal tricuspid annular plane systolic excursion (TAPSE) > 1.7 cm.    Aortic Valve: There is mild regurgitation. There is aortic valve sclerosis.    Tricuspid Valve: There is moderate regurgitation. The right ventricular systolic pressure is mildly to moderately elevated. The estimated right ventricular systolic pressure is 52.00 mmHg.    Pulmonic Valve: There is mild regurgitation.    Pericardium: There is no pericardial effusion    Results from last 7 days   Lab Units 03/20/24  0503 03/19/24  0430 03/18/24  1047   WBC Thousand/uL 10.89* 9.68 9.21   HEMOGLOBIN g/dL 10.5* 10.6* 11.4*   HEMATOCRIT % 33.1* 33.5* 36.1   PLATELETS Thousands/uL 336 327 327   NEUTROS ABS Thousands/µL  --   --  5.46     Results from last 7 days   Lab Units 03/20/24  0503 03/19/24  0430 03/18/24  1047   SODIUM mmol/L 138 140 139   POTASSIUM mmol/L 4.4 4.1 4.1   CHLORIDE mmol/L 107 107 105   CO2 mmol/L 26 27 25   ANION GAP mmol/L 5 6 9   BUN mg/dL 16 11 18   CREATININE mg/dL 0.79 0.63 0.71   EGFR ml/min/1.73sq m 71 85 81   CALCIUM mg/dL 8.1* 8.2* 8.8     Results from last 7 days   Lab Units 03/18/24  1047   AST U/L 23   ALT U/L 27   ALK PHOS U/L 79   TOTAL PROTEIN g/dL 6.5   ALBUMIN g/dL 3.7   TOTAL BILIRUBIN mg/dL 0.91     Results from last 7 days   Lab Units 03/20/24  0503 03/19/24  0430 03/18/24  1047   GLUCOSE RANDOM mg/dL 92 89 105     Results from last 7 days   Lab Units 03/18/24  1557 03/18/24  1312 03/18/24  1047   HS TNI 0HR ng/L  --   --  19   HS TNI 2HR ng/L  --  17  --    HSTNI D2 ng/L  --   -2  --    HS TNI 4HR ng/L 16  --   --    HSTNI D4 ng/L -3  --   --      Results from last 7 days   Lab Units 03/20/24  0503 03/19/24  0430 03/18/24 2031 03/18/24  1047   PROTIME seconds  --   --   --  13.5   INR   --   --   --  0.97   PTT seconds 107* 82* 90* 28     Results from last 7 days   Lab Units 03/18/24  1047   BNP pg/mL 340*       ED Treatment:   Medication Administration from 03/18/2024 1025 to 03/18/2024 1943         Date/Time Order Dose Route Action Comments     03/18/2024 1341 EDT acetaminophen (TYLENOL) tablet 650 mg 650 mg Oral Given --     03/18/2024 1400 EDT heparin (porcine) injection 6,000 Units 6,000 Units Intravenous Given --     03/18/2024 1400 EDT heparin (porcine) 25,000 units in 0.45% NaCl 250 mL infusion (premix) 18 Units/kg/hr Intravenous New Bag --          History reviewed. No pertinent past medical history.  Present on Admission:   Atrial flutter, paroxysmal (HCC)      Admitting Diagnosis: Pulmonary embolism (HCC) [I26.99]  Hypoxia [R09.02]  Elevated brain natriuretic peptide (BNP) level [R79.89]  Right leg swelling [M79.89]  Age/Sex: 78 y.o. female  Admission Orders: 3/18/24 1506 inpatient   Scheduled Medications:  chlorhexidine, 15 mL, Mouth/Throat, Q12H SUSY  lidocaine, 1 patch, Topical, Daily  pantoprazole, 40 mg, Oral, Early Morning      Continuous IV Infusions:  heparin (porcine), 3-30 Units/kg/hr (Order-Specific), Intravenous, Titrated      PRN Meds:  acetaminophen, 975 mg, Oral, Q6H PRN - x 1 3/18 at 650 mg.   heparin (porcine), 3,000 Units, Intravenous, Q6H PRN  heparin (porcine), 6,000 Units, Intravenous, Q6H PRN  HYDROmorphone, 0.2 mg, Intravenous, Q2H PRN  oxyCODONE, 2.5 mg, Oral, Q4H PRN x 1 3/19/24    Or  oxyCODONE, 5 mg, Oral, Q4H PRN    Cardio pulmonary monitoring  Telemetry   Oxygen to keep sat > 94%  clears        Network Utilization Review Department  ATTENTION: Please call with any questions or concerns to [phone] and carefully listen to the prompts so that you  are directed to the right person. All voicemails are confidential.   For Discharge needs, contact Care Management DC Support Team at [phone] opt. 2  Send all requests for admission clinical reviews, approved or denied determinations and any other requests to dedicated fax number below belonging to the campus where the patient is receiving treatment. List of dedicated fax numbers for the Facilities:  FACILITY NAME UR FAX NUMBER   ADMISSION DENIALS (Administrative/Medical Necessity) [phone]   DISCHARGE SUPPORT TEAM (NETWORK) [phone]   PARENT CHILD HEALTH (Maternity/NICU/Pediatrics) [phone]   [facility] [phone]   [facility] [phone]   [facility] [phone]   [facility] [phone]   [facility] [phone]   [facility] [phone]   [facility] [phone]   [facility] [phone]   [facility] [phone]   [facility] [phone]   [facility] [phone]   [facility] [phone]

## 2024-03-19 NOTE — ED ATTENDING ATTESTATION
3/18/2024  IEverardo DO, saw and evaluated the patient. I have discussed the patient with the resident/non-physician practitioner and agree with the resident's/non-physician practitioner's findings, Plan of Care, and MDM as documented in the resident's/non-physician practitioner's note, except where noted. All available labs and Radiology studies were reviewed.  I was present for key portions of any procedure(s) performed by the resident/non-physician practitioner and I was immediately available to provide assistance.       At this point I agree with the current assessment done in the Emergency Department.  I have conducted an independent evaluation of this patient a history and physical is as follows: 78-year-old female status post right knee replacement with subsequent swelling and pain in the right lower extremity.  Additionally with shortness of breath but without chest pain.  See PA note for full details.    Hypoxic to 88% on room air, tachypneic, vital signs otherwise stable.  Patient with mild increased work of breathing.  Lungs clear to auscultation.  Heart tachycardic but without murmur, gallop, rub.  Abdomen soft nontender.  Right lower extremity considerably edematous, with global tenderness.  Unable to palpate distal pulses however distally well-perfused with cap refill less than 2 seconds.  Unable to palpate distal pulse secondary to amount of edema.  5 out of 5 strength in flexion and extension distally.  Incision status post knee well-healing without surrounding erythema or purulence.      78-year-old female presenting to the emergency department with DVT in the right lower extremity and subsequently identified PE.  High suspicion PE so heparin bolus and drip ordered prior to imaging results.  Imaging did return with significant clot burden PE.  Work of breathing and approxiresolved with minimal O2 nasal cannula.  CBC, CMP without other acute pathology.  Mild elevation in BNP and troponin.   Patient not a candidate for lytics at this time.  Right heart strain on bedside ultrasound.  Call placed to critical care who accepted to their care.  Critical care physician deferred PERT involvement.        ED Course  ED Course as of 03/18/24 2047   Mon Mar 18, 2024   1252 Pt presentation especially concerning for PE. Stable at this time, but initiating heparin bolus/   1253 XR without acute patho.    1317 Resting comfortably on 3-4L NC. Made CTPE priority.    1435 Spoke with rads again requesting STAT read.          Critical Care Time  CriticalCare Time    Date/Time: 3/18/2024 8:47 PM    Performed by: Everardo Echols DO  Authorized by: Everardo Echols DO    Critical care provider statement:     Critical care time (minutes):  60    Critical care time was exclusive of:  Separately billable procedures and treating other patients and teaching time    Critical care was necessary to treat or prevent imminent or life-threatening deterioration of the following conditions:  Respiratory failure    Critical care was time spent personally by me on the following activities:  Obtaining history from patient or surrogate, development of treatment plan with patient or surrogate, discussions with consultants, evaluation of patient's response to treatment, examination of patient, re-evaluation of patient's condition, review of old charts, ordering and review of radiographic studies, ordering and review of laboratory studies and ordering and performing treatments and interventions    I assumed direction of critical care for this patient from another provider in my specialty: no

## 2024-03-19 NOTE — PROGRESS NOTES
[facility]  Progress Note  Name: Stormy Hermosillo I  MRN: [id number]  Unit/Bed#: ICU 16 I Date of Admission: 3/18/2024   Date of Service: 3/19/2024 I Hospital Day: 1    Assessment/Plan   * Pulmonary embolism, bilateral (HCC)  Assessment & Plan  POA with worsening shortness of breath s/p right total knee arthroplasty on 3/12/2024. Reports taking aspirin 81 mg, twice daily since her procedure.  Venous duplex notable for evidence of acute nonocclusive DVT in the posterior tibial and peroneal veins.  Well-defined hypoechoic nonvascular cystic type structure, 5.6 cm noted in the popliteal fossa.  3/18: CTA PE: Extensive bilateral pulmonary emboli involving the main arteries and lower branches with some extending distally to the subsegmental level.  No discrete areas of parenchymal infarction. Calculated RV/LV ratio 1.14.  3/18: Venous duplex notable for evidence of acute nonocclusive DVT in the R posterior tibial and peroneal veins. Well-defined hypoechoic nonvascular cystic type structure, 5.6 cm noted in the popliteal fossa.  3/18: Echo EF 65%, G1 DD. Right ventricle is not well-visualized, cavity size is dilated. Normal TAPSE. RVSP 52 mmHg.  Etiology of PE, provoked in the setting of recent surgery.     Plan:  PESI score: Intermediate risk.  Heparin VTE/PE ggt.   Monitor hemodynamics closely -should any change occur reengage PERT team for possible thrombectomy.    History of total knee arthroplasty, right  Assessment & Plan  S/p right total knee arthroplasty on 3/12/2024.   Will consider Ortho consult.    GERD (gastroesophageal reflux disease)  Assessment & Plan  Hold home PTA Prilosec.     Hyperlipidemia  Assessment & Plan  Hold home PTA Lipitor.     Acute deep vein thrombosis (DVT) of distal vein of right lower extremity (HCC)  Assessment & Plan  3/18: Venous duplex notable for evidence of acute nonocclusive DVT in the R posterior tibial and peroneal veins. Well-defined hypoechoic  nonvascular cystic type structure, 5.6 cm noted in the popliteal fossa.             Disposition: Stepdown Level 1    ICU Core Measures     A: Assess, Prevent, and Manage Pain Has pain been assessed? Yes  Need for changes to pain regimen? No   B: Both SAT/SAT  N/A   C: Choice of Sedation RASS Goal: 0 Alert and Calm  Need for changes to sedation or analgesia regimen? No   D: Delirium CAM-ICU:    E: Early Mobility  Plan for early mobility? Yes   F: Family Engagement Plan for family engagement today? Yes         Prophylaxis:  VTE VTE covered by:  heparin (porcine), Intravenous, 18 Units/kg/hr at 03/18/24 1400  heparin (porcine), Intravenous  heparin (porcine), Intravenous       Stress Ulcer  not orderedcovered byomeprazole (PriLOSEC) 20 mg delayed release capsule [[id number]] (Long-Term Med)         Significant 24hr Events     24hr events: Overnight, patient noted to have paroxysmal atrial flutter lasting 1 to 2 minutes and resolved on its own.  Telemetry reviewed, no atrial flutter events noted other than sinus tachycardia and PVCs.  Patient does have history of PVC's. She reports having pain on the left mid ribs with deep breathing that started this morning around 4 am.      Subjective   Review of Systems   Constitutional:  Positive for chills. Negative for fever.   HENT:  Negative for sore throat.    Respiratory:  Positive for shortness of breath. Negative for cough.    Cardiovascular:  Negative for chest pain.   Gastrointestinal:  Negative for abdominal pain, constipation, diarrhea, nausea and vomiting.   Neurological:  Negative for headaches.      Objective                            Vitals I/O      Most Recent Min/Max in 24hrs   Temp 98.8 °F (37.1 °C) Temp  Min: 97.6 °F (36.4 °C)  Max: 98.9 °F (37.2 °C)   Pulse 83 Pulse  Min: 83  Max: 164   Resp 19 Resp  Min: 19  Max: 38   /73 BP  Min: 123/73  Max: 191/98   O2 Sat 90 % SpO2  Min: 85 %  Max: 96 %      Intake/Output Summary (Last 24 hours) at 3/19/2024  0726  Last data filed at 3/19/2024 0401  Gross per 24 hour   Intake 549.24 ml   Output 700 ml   Net -150.76 ml       Diet Clear Liquid    Invasive Monitoring           Physical Exam   Physical Exam  Eyes:      Extraocular Movements: Extraocular movements intact.      Conjunctiva/sclera: Conjunctivae normal.   Skin:     General: Skin is warm.      Capillary Refill: Capillary refill takes less than 2 seconds.      Findings: Rash and wound present.      Comments: Healing surgical incisions on the right anterior knee, without erythema or drainage. Healing wound on the anterior knee. Right lower extremity with erythematous, shiny, smooth swollen up to the knees. Ecchymosis in the medial and lateral aspect of the ankle.    HENT:      Head: Normocephalic and atraumatic.      Mouth/Throat:      Mouth: Mucous membranes are moist.   Cardiovascular:      Rate and Rhythm: Normal rate and regular rhythm.   Musculoskeletal:      Right lower le+ Edema present.      Left lower leg: No edema.   Abdominal:      Palpations: Abdomen is soft.      Tenderness: There is no abdominal tenderness.   Constitutional:       General: She is in acute distress (pain).      Appearance: She is not ill-appearing.   Pulmonary:      Effort: Pulmonary effort is normal. No respiratory distress.      Breath sounds: Normal breath sounds.   Neurological:      Mental Status: She is alert and oriented to person, place and time.            Diagnostic Studies      EKG: None  Imaging: None      Medications:  Scheduled PRN   chlorhexidine, 15 mL, Q12H SUSY  lidocaine, 1 patch, Daily      acetaminophen, 975 mg, Q6H PRN  heparin (porcine), 3,000 Units, Q6H PRN  heparin (porcine), 6,000 Units, Q6H PRN  HYDROmorphone, 0.2 mg, Q2H PRN  oxyCODONE, 2.5 mg, Q4H PRN   Or  oxyCODONE, 5 mg, Q4H PRN       Continuous    heparin (porcine), 3-30 Units/kg/hr (Order-Specific), Last Rate: 18 Units/kg/hr (24 1400)         Labs:    CBC    Recent Labs     24  1047  03/19/24  0430   WBC 9.21 9.68   HGB 11.4* 10.6*   HCT 36.1 33.5*    327     BMP    Recent Labs     03/18/24  1047 03/19/24  0430   SODIUM 139 140   K 4.1 4.1    107   CO2 25 27   AGAP 9 6   BUN 18 11   CREATININE 0.71 0.63   CALCIUM 8.8 8.2*       Coags    Recent Labs     03/18/24  1047 03/18/24  2031 03/19/24  0430   INR 0.97  --   --    PTT 28 90* 82*        Additional Electrolytes  No recent results       Blood Gas    No recent results  No recent results LFTs  Recent Labs     03/18/24  1047   ALT 27   AST 23   ALKPHOS 79   ALB 3.7   TBILI 0.91       Infectious  No recent results  Glucose  Recent Labs     03/18/24  1047 03/19/24  0430   GLUC 105 89               Kleber Brown, DO

## 2024-03-19 NOTE — PLAN OF CARE
Problem: PHYSICAL THERAPY ADULT  Goal: Performs mobility at highest level of function for planned discharge setting.  See evaluation for individualized goals.  Description: Treatment/Interventions: Functional transfer training, LE strengthening/ROM, Elevations, Therapeutic exercise, Cognitive reorientation, Patient/family training, Equipment eval/education, Bed mobility, Gait training, Spoke to nursing, Spoke to case management, OT          See flowsheet documentation for full assessment, interventions and recommendations.  Note: Prognosis: Fair  Problem List: Decreased strength, Decreased endurance, Decreased mobility, Impaired balance, Impaired judgement, Decreased safety awareness, Obesity, Decreased skin integrity, Orthopedic restrictions, Pain  Assessment: Pt is a 78 y.o. female seen for PT evaluation s/p admit to [facility] on 3/18/2024. Pt was admitted with a primary dx of: pulmonary embolism, bilateral.  PT now consulted for assessment of mobility and d/c needs. Pt with OOB to chair orders.  Pts current comorbidities and personal factors effecting treatment include: BMI, GERD, HLD, recent R TKR. Pts current clinical presentation is Unstable/Unpredictable (high complexity) due to Ongoing medical management for primary dx, Increased reliance on more restrictive AD compared to baseline, Decreased activity tolerance compared to baseline, Fall risk, Increased assistance needed from caregiver at current time, Ongoing telemetry monitoring, Increased O2 via NC from pts baseline, s/p surgical intervention. Prior to admission, pt was independent with use of SBQC. Upon evaluation, pt currently is requiring Supervision for bed mobility; Supervision for transfers and CGA for ambulation 5 ft w/ NBQC. Pt presents at PT eval functioning below baseline and currently w/ overall mobility deficits 2* to: BLE weakness, impaired balance, decreased endurance, gait deviations, pain, decreased activity tolerance compared  to baseline, decreased functional mobility tolerance compared to baseline, decreased safety awareness, impaired judgement, fall risk, orthopedic restrictions, decreased skin integrity. Pt currently at a fall risk 2* to impairments listed above.  Pt will continue to benefit from skilled acute PT interventions to address stated impairments; to maximize functional mobility; for ongoing pt/ family training; and DME needs. At conclusion of PT session chair alarm engaged, all needs in reach, RN notified of session findings/recommendations, and pt returned back in recliner chair with phone and call bell within reach. Pt denies any further questions at this time. Recommend Level III (Minimum Resource Intensity)  upon hospital D/C.        Rehab Resource Intensity Level, PT: III (Minimum Resource Intensity)    See flowsheet documentation for full assessment.

## 2024-03-19 NOTE — PLAN OF CARE
Problem: PAIN - ADULT  Goal: Verbalizes/displays adequate comfort level or baseline comfort level  Description: Interventions:  - Encourage patient to monitor pain and request assistance  - Assess pain using appropriate pain scale  - Administer analgesics based on type and severity of pain and evaluate response  - Implement non-pharmacological measures as appropriate and evaluate response  - Consider cultural and social influences on pain and pain management  - Notify physician/advanced practitioner if interventions unsuccessful or patient reports new pain  Outcome: Progressing     Problem: INFECTION - ADULT  Goal: Absence or prevention of progression during hospitalization  Description: INTERVENTIONS:  - Assess and monitor for signs and symptoms of infection  - Monitor lab/diagnostic results  - Monitor all insertion sites, i.e. indwelling lines, tubes, and drains  - Monitor endotracheal if appropriate and nasal secretions for changes in amount and color  - Jackson appropriate cooling/warming therapies per order  - Administer medications as ordered  - Instruct and encourage patient and family to use good hand hygiene technique  - Identify and instruct in appropriate isolation precautions for identified infection/condition  Outcome: Progressing  Goal: Absence of fever/infection during neutropenic period  Description: INTERVENTIONS:  - Monitor WBC    Outcome: Progressing     Problem: SAFETY ADULT  Goal: Patient will remain free of falls  Description: INTERVENTIONS:  - Educate patient/family on patient safety including physical limitations  - Instruct patient to call for assistance with activity   - Consult OT/PT to assist with strengthening/mobility   - Keep Call bell within reach  - Keep bed low and locked with side rails adjusted as appropriate  - Keep care items and personal belongings within reach  - Initiate and maintain comfort rounds  - Make Fall Risk Sign visible to staff  - Offer Toileting every 2 Hours,  in advance of need  - Initiate/Maintain bed alarm  - Obtain necessary fall risk management equipment:   - Apply yellow socks and bracelet for high fall risk patients  - Consider moving patient to room near nurses station  Outcome: Progressing  Goal: Maintain or return to baseline ADL function  Description: INTERVENTIONS:  -  Assess patient's ability to carry out ADLs; assess patient's baseline for ADL function and identify physical deficits which impact ability to perform ADLs (bathing, care of mouth/teeth, toileting, grooming, dressing, etc.)  - Assess/evaluate cause of self-care deficits   - Assess range of motion  - Assess patient's mobility; develop plan if impaired  - Assess patient's need for assistive devices and provide as appropriate  - Encourage maximum independence but intervene and supervise when necessary  - Involve family in performance of ADLs  - Assess for home care needs following discharge   - Consider OT consult to assist with ADL evaluation and planning for discharge  - Provide patient education as appropriate  Outcome: Progressing  Goal: Maintains/Returns to pre admission functional level  Description: INTERVENTIONS:  - Perform AM-PAC 6 Click Basic Mobility/ Daily Activity assessment daily.  - Set and communicate daily mobility goal to care team and patient/family/caregiver.   - Collaborate with rehabilitation services on mobility goals if consulted  - Perform Range of Motion 8 times a day.  - Reposition patient every 2 hours.  - Dangle patient  times a day  - Stand patient  times a day  - Ambulate patient  times a day  - Out of bed to chair  times a day   - Out of bed for meals  times a day  - Out of bed for toileting  - Record patient progress and toleration of activity level   Outcome: Progressing     Problem: DISCHARGE PLANNING  Goal: Discharge to home or other facility with appropriate resources  Description: INTERVENTIONS:  - Identify barriers to discharge w/patient and caregiver  -  Arrange for needed discharge resources and transportation as appropriate  - Identify discharge learning needs (meds, wound care, etc.)  - Arrange for interpretive services to assist at discharge as needed  - Refer to Case Management Department for coordinating discharge planning if the patient needs post-hospital services based on physician/advanced practitioner order or complex needs related to functional status, cognitive ability, or social support system  Outcome: Progressing     Problem: Knowledge Deficit  Goal: Patient/family/caregiver demonstrates understanding of disease process, treatment plan, medications, and discharge instructions  Description: Complete learning assessment and assess knowledge base.  Interventions:  - Provide teaching at level of understanding  - Provide teaching via preferred learning methods  Outcome: Progressing

## 2024-03-19 NOTE — PROGRESS NOTES
Critical Care Attending Note     78 year old woman with HLP and OA, presented 1 week after right TKR for increasing dyspnea. Found to have submassive VTE and RLE DVT. Started on UFH gtt in ED.  PERT alert called and plan for systemic AC for now and monitoring.      24hr events - had transient episodes of afib/flutter on telemetry, resolved without interventions, noted left sided pleuritic pain this am, no hemoptysis, no SSCP    VS AF, HR 80-90's, MAPS 's, SpO2 90-93% 4LNC, I/Os -150cc  Exam  GEN obese older woman, in bed, awake, nontoxic, discomfort with pleurisy  HEENT MMM, no thrush, no scleral icterus  NECK no accessory muscle use, JVD not elevated  CV reg, single s1/2, no m/r  Pulm diminished BS secondary to pleurisy, no wheeze or rales, no pleural rubs  ABD +BS soft NTND, no rebound  EXT warm, brisk cap refill, 1-2+ pretibial edema on right, right knee incision CDI - improved appearance, no purulence    Laboratory and Diagnostics  Results from last 7 days   Lab Units 03/19/24  0430 03/18/24  1047   WBC Thousand/uL 9.68 9.21   HEMOGLOBIN g/dL 10.6* 11.4*   HEMATOCRIT % 33.5* 36.1   PLATELETS Thousands/uL 327 327   NEUTROS PCT %  --  59   MONOS PCT %  --  12   EOS PCT %  --  2     Results from last 7 days   Lab Units 03/19/24  0430 03/18/24  1047 03/13/24  0419   SODIUM mmol/L 140 139 141   POTASSIUM mmol/L 4.1 4.1 4.2   CHLORIDE mmol/L 107 105 107   CO2 mmol/L 27 25 27   ANION GAP mmol/L 6 9 7   BUN mg/dL 11 18 17   CREATININE mg/dL 0.63 0.71 0.69   CALCIUM mg/dL 8.2* 8.8 8.5   GLUCOSE RANDOM mg/dL 89 105 97   ALT U/L  --  27  --    AST U/L  --  23  --    ALK PHOS U/L  --  79  --    ALBUMIN g/dL  --  3.7  --    TOTAL BILIRUBIN mg/dL  --  0.91  --           Results from last 7 days   Lab Units 03/19/24  0430 03/18/24 2031 03/18/24  1047   INR   --   --  0.97   PTT seconds 82* 90* 28      , HS Trop 19     MICRO  none     RADIOGRAPHS - images personally reviewed  CT angio 3/18 - bilateral PEs with  thin strand saddle PE, RV/LV 1.14, no effusions, basilar atelectasis     TTE 3/2024 EF 65%, grade I diastolic dysfunction, RV dilated poor visualization, RVSP 52, normal TAPSE     LE US 3/18 - nonocclusive thrombus post tibial and peroneal veins, neg for LLE DVT    Tele personally reviewed - short periods of afib with RVR - spont converts to SR, currently SR 80-90's     Assessment   Acute hypoxic respiratory failure  Provoke submassive saddle PE and RLE DVT  Mild RV dysfunction  Pleurisy secondary to #1  Recent right total knee replacement  Transient Afib episodes     PLAN  PERT team discussion 3/18, held catheter directed thrombectomy - PESI 99 - class III, patient in agreement  Follow HD status closely, continue telemetry, hold on BB for now  Continue UFH gtt for now - possibly transition to DOAC in next 24hrs  Should clinical decline occur, re-engage PERT team for possible thrombectomy  Add single dose toradol 15mg x 1 for pleuritic pain, consider cautious NSAID use for next 24-48hrs  ADAT  Add back PPI  Remain Level I SDU status for now    Geraldo Alves, DO

## 2024-03-19 NOTE — CASE MANAGEMENT
Case Management Assessment & Discharge Planning Note    Patient name Stormy Hermosillo  Location ICU 16/ICU 16 MRN [id number]  : 1945 Date 3/19/2024       Current Admission Date: 3/18/2024  Current Admission Diagnosis:Pulmonary embolism, bilateral (HCC)   Patient Active Problem List    Diagnosis Date Noted    Atrial flutter, paroxysmal (HCC) 2024    History of total knee arthroplasty, right 2024    Pulmonary embolism, bilateral (HCC) 2024    GERD (gastroesophageal reflux disease) 2024    Hyperlipidemia 2024    Acute deep vein thrombosis (DVT) of distal vein of right lower extremity (HCC) 2024    Unilateral primary osteoarthritis, right knee 2024    Right hand pain 2023    Primary osteoarthritis of right hand 2023    Localized primary osteoarthritis of carpometacarpal (CMC) joint of right wrist 2023    Symptomatic varicose veins of both lower extremities 2018    Venous insufficiency of both lower extremities 2018      LOS (days): 1  Geometric Mean LOS (GMLOS) (days): 4  Days to GMLOS:3     OBJECTIVE:    Risk of Unplanned Readmission Score: 7.62         Current admission status: Inpatient       Preferred Pharmacy:   [id number] - [place] [address]  [address] [facility]  [place] [address]  Phone: [phone] Fax: [phone]    Primary Care Provider: Mateo Mahoney DO    Primary Insurance: [employer] RE[employer]  Secondary Insurance:     ASSESSMENT:  Active Health Care Proxies    There are no active Health Care Proxies on file.       Patient Information  Admitted from:: Home  Mental Status: Alert  During Assessment patient was accompanied by: Spouse (Jose Roberto)  Assessment information provided by:: Patient  Primary Caregiver: Self  Support Systems: Spouse/significant other  County of Residence: [place]  What city do you live in?: [place]  Home entry access options. Select all that apply.: Stairs  Number of steps to enter  home.: 1  Type of Current Residence: [facility]  Living Arrangements: Lives w/ Spouse/significant other    Activities of Daily Living Prior to Admission  Functional Status: Independent  Completes ADLs independently?: Yes  Ambulates independently?: Yes  Does patient use assisted devices?: No  Does patient currently own DME?: Yes  What DME does the patient currently own?: Walker, Straight Cane  Does patient have a history of Outpatient Therapy (PT/OT)?: Yes  Does the patient have a history of Short-Term Rehab?: No  Does patient have a history of HHC?: Yes  Does patient currently have HHC?: No    Patient Information Continued  Income Source: Pension/FCI  Does patient have prescription coverage?: Yes  Does patient receive dialysis treatments?: No  Does patient have a history of substance abuse?: No  Does patient have a history of Mental Health Diagnosis?: No    Means of Transportation  Means of Transport to Appts:: Drives Self      Social Determinants of Health (SDOH)      Flowsheet Row Most Recent Value   Housing Stability    In the last 12 months, was there a time when you were not able to pay the mortgage or rent on time? N   In the last 12 months, was there a time when you did not have a steady place to sleep or slept in a shelter (including now)? N   Transportation Needs    In the past 12 months, has lack of transportation kept you from medical appointments or from getting medications? no   In the past 12 months, has lack of transportation kept you from meetings, work, or from getting things needed for daily living? No   Food Insecurity    Within the past 12 months, you worried that your food would run out before you got the money to buy more. Never true   Within the past 12 months, the food you bought just didn't last and you didn't have money to get more. Never true   Utilities    In the past 12 months has the electric, gas, oil, or water company threatened to shut off services in your home? No             DISCHARGE DETAILS:    Discharge planning discussed with:: Pt and her , Jose Roberto    Contacts  Patient Contacts: , Jose Roberto  Relationship to Patient:: Family  Contact Method: In Person  Reason/Outcome: Continuity of Care, Emergency Contact, Referral, Discharge Planning    Other Referral/Resources/Interventions Provided:  Interventions: Other (Specify)  Referral Comments: CM met with pt and her , Jose Roberto, at bedside. Introduced self/role with dcp. Pt reports after her knee surgery a week ago she has been going to OP therapy with [facility]. Pt reports that her  is home to help her and assisting with transportation.

## 2024-03-20 ENCOUNTER — APPOINTMENT (OUTPATIENT)
Dept: PHYSICAL THERAPY | Facility: CLINIC | Age: 79
End: 2024-03-20
Payer: COMMERCIAL

## 2024-03-20 LAB
ANION GAP SERPL CALCULATED.3IONS-SCNC: 5 MMOL/L (ref 4–13)
APTT PPP: 107 SECONDS (ref 23–37)
APTT PPP: 66 SECONDS (ref 23–37)
BUN SERPL-MCNC: 16 MG/DL (ref 5–25)
CALCIUM SERPL-MCNC: 8.1 MG/DL (ref 8.4–10.2)
CHLORIDE SERPL-SCNC: 107 MMOL/L (ref 96–108)
CO2 SERPL-SCNC: 26 MMOL/L (ref 21–32)
CREAT SERPL-MCNC: 0.79 MG/DL (ref 0.6–1.3)
ERYTHROCYTE [DISTWIDTH] IN BLOOD BY AUTOMATED COUNT: 14.8 % (ref 11.6–15.1)
GFR SERPL CREATININE-BSD FRML MDRD: 71 ML/MIN/1.73SQ M
GLUCOSE SERPL-MCNC: 92 MG/DL (ref 65–140)
HCT VFR BLD AUTO: 33.1 % (ref 34.8–46.1)
HGB BLD-MCNC: 10.5 G/DL (ref 11.5–15.4)
MCH RBC QN AUTO: 29.3 PG (ref 26.8–34.3)
MCHC RBC AUTO-ENTMCNC: 31.7 G/DL (ref 31.4–37.4)
MCV RBC AUTO: 93 FL (ref 82–98)
PLATELET # BLD AUTO: 336 THOUSANDS/UL (ref 149–390)
PMV BLD AUTO: 9.5 FL (ref 8.9–12.7)
POTASSIUM SERPL-SCNC: 4.4 MMOL/L (ref 3.5–5.3)
RBC # BLD AUTO: 3.58 MILLION/UL (ref 3.81–5.12)
SODIUM SERPL-SCNC: 138 MMOL/L (ref 135–147)
WBC # BLD AUTO: 10.89 THOUSAND/UL (ref 4.31–10.16)

## 2024-03-20 PROCEDURE — 99222 1ST HOSP IP/OBS MODERATE 55: CPT | Performed by: ORTHOPAEDIC SURGERY

## 2024-03-20 PROCEDURE — 99232 SBSQ HOSP IP/OBS MODERATE 35: CPT | Performed by: INTERNAL MEDICINE

## 2024-03-20 PROCEDURE — 97110 THERAPEUTIC EXERCISES: CPT

## 2024-03-20 PROCEDURE — 97167 OT EVAL HIGH COMPLEX 60 MIN: CPT

## 2024-03-20 PROCEDURE — 85730 THROMBOPLASTIN TIME PARTIAL: CPT

## 2024-03-20 PROCEDURE — 85027 COMPLETE CBC AUTOMATED: CPT

## 2024-03-20 PROCEDURE — 80048 BASIC METABOLIC PNL TOTAL CA: CPT

## 2024-03-20 PROCEDURE — 97116 GAIT TRAINING THERAPY: CPT

## 2024-03-20 RX ADMIN — APIXABAN 10 MG: 5 TABLET, FILM COATED ORAL at 13:45

## 2024-03-20 RX ADMIN — CHLORHEXIDINE GLUCONATE 15 ML: 1.2 SOLUTION ORAL at 08:35

## 2024-03-20 RX ADMIN — ACETAMINOPHEN 975 MG: 325 TABLET, FILM COATED ORAL at 13:46

## 2024-03-20 RX ADMIN — PANTOPRAZOLE SODIUM 40 MG: 40 TABLET, DELAYED RELEASE ORAL at 05:49

## 2024-03-20 RX ADMIN — HEPARIN SODIUM 18 UNITS/KG/HR: 10000 INJECTION, SOLUTION INTRAVENOUS at 02:10

## 2024-03-20 RX ADMIN — ACETAMINOPHEN 975 MG: 325 TABLET, FILM COATED ORAL at 21:14

## 2024-03-20 RX ADMIN — APIXABAN 10 MG: 5 TABLET, FILM COATED ORAL at 18:29

## 2024-03-20 NOTE — CASE MANAGEMENT
Case Management Discharge Planning Note    Patient name Stormy Hermosillo  Location ICU 16/ICU 16 MRN [id number]  : 1945 Date 3/20/2024       Current Admission Date: 3/18/2024  Current Admission Diagnosis:Pulmonary embolism, bilateral (HCC)   Patient Active Problem List    Diagnosis Date Noted    Atrial flutter, paroxysmal (HCC) 2024    History of total knee arthroplasty, right 2024    Pulmonary embolism, bilateral (HCC) 2024    GERD (gastroesophageal reflux disease) 2024    Hyperlipidemia 2024    Acute deep vein thrombosis (DVT) of distal vein of right lower extremity (HCC) 2024    Unilateral primary osteoarthritis, right knee 2024    Right hand pain 2023    Primary osteoarthritis of right hand 2023    Localized primary osteoarthritis of carpometacarpal (CMC) joint of right wrist 2023    Symptomatic varicose veins of both lower extremities 2018    Venous insufficiency of both lower extremities 2018      LOS (days): 2  Geometric Mean LOS (GMLOS) (days): 4  Days to GMLOS:2     OBJECTIVE:  Risk of Unplanned Readmission Score: 7.57         Current admission status: Inpatient   Preferred Pharmacy:   [id number] - [place] [address]  [place] [address]  Phone: [phone] Fax: [phone]    Primary Care Provider: Mateo Mahoney DO    Primary Insurance: [employer] [facility] RE[employer]  Secondary Insurance:     DISCHARGE DETAILS:        Other Referral/Resources/Interventions Provided:  Interventions: Prescription Price Check  Referral Comments: CM consulted to check cost of Eliquis. CM contacted [facility]. Cost is $43.96. Resident informed of same.    CM met with pt and reviewed cost and provided coupon for the first 30 days free. Pt and her  did confirm pt will resume her OP PT at [facility].

## 2024-03-20 NOTE — PHYSICAL THERAPY NOTE
Physical Therapy Treatment Note    Patient's Name: Stormy Hermosillo  : 1945         03/20/24 5589   Note Type   Note Type Treatment   Pain Assessment   Pain Assessment Tool 0-10   Pain Score 5   Pain Location/Orientation Orientation: Right;Location: Knee   Effect of Pain on Daily Activities limits mobility   Hospital Pain Intervention(s) Repositioned;Ambulation/increased activity   Restrictions/Precautions   Weight Bearing Precautions Per Order Yes   RLE Weight Bearing Per Order WBAT  (R tka)   Other Precautions Chair Alarm;Bed Alarm;Fall Risk;Telemetry;Multiple lines   General   Chart Reviewed Yes   Response to Previous Treatment Patient reporting fatigue but able to participate.   Family/Caregiver Present No   Cognition   Orientation Level Oriented X4   Following Commands Follows one step commands with increased time or repetition   Comments pt ID by wristband, name and    Subjective   Subjective agreeable to PT treatment   Bed Mobility   Additional Comments pt OOB in recliner pre/post   Transfers   Sit to Stand 5  Supervision   Additional items Increased time required;Verbal cues;Armrests   Stand to Sit 5  Supervision   Additional items Increased time required;Verbal cues   Additional Comments x4 STS performed throuhgout session, consistent supervision   Ambulation/Elevation   Gait pattern Decreased foot clearance;Decreased R stance;Short stride;Excessively slow   Gait Assistance 4  Minimal assist   Additional items Assist x 1   Assistive Device Rolling walker  (deferred RW for longer distances)   Distance 25'x1, 100'x2   Ambulation/Elevation Additional Comments post ambulation SP O2 95% on RA, decreased TKE   Balance   Static Sitting Fair +   Dynamic Sitting Fair   Static Standing Fair   Dynamic Standing Fair -  (RW)   Ambulatory Poor +  (RW)   Activity Tolerance   Activity Tolerance Patient limited by fatigue   Medical Staff Made Aware  spoke to OT, attending Dr. Caban   Nurse Made Aware RN richar   Exercises   Quad Sets Supine;10 reps   Heelslides Sitting;20 reps   Glute Sets Sitting;10 reps   Knee PROM Flexion   (5')   Knee AROM Long Arc Quad Sitting;20 reps   Assessment   Prognosis Fair   Problem List Decreased strength;Decreased endurance;Impaired balance;Decreased range of motion;Decreased mobility;Impaired judgement;Decreased cognition;Decreased safety awareness;Obesity;Decreased skin integrity;Orthopedic restrictions;Pain   Assessment pt demonstrates progression of all aspects of mobility this session. demonstrates singificant improvement in ambulatory distances as compared to IE. Pt no longer requiring supplemental O2 and currently ambulates 100'x2 on RA with SP O2 of 95%. Pt tolerates ther ex fair, requires vc to increase ROM with AROM exercise. Overall pt demonstrates good progression toward pt and PT goals. Continue to reccomend  level III disposition at discharge.   Goals   Patient Goals to go home   STG Expiration Date 03/29/24   Short Term Goal #1 In 10 days pt will be able to: 1. Demonstrate ability to perform all aspects of bed mobility independently to improve functional safety. 2. Perform functional transfers independently to facilitate safe return to previous living environment. 3. Ambulate 150 ft with LRAD independently with stable vitals to improve safety with household distances and reduce fall risk. 4. Improve LE strength grades by 1 to increase ease of functional mobility with transfers and gait. 5. Pt will demonstrate improved balance by one grade in order to decrease risk of falls. 6. Climb at least 1 steps with 1 HR independently to simulate entrance to home.   PT Treatment Day 1   Plan   Treatment/Interventions Functional transfer training;LE strengthening/ROM;Elevations;Therapeutic exercise;Cognitive reorientation;Patient/family training;Bed mobility;Equipment eval/education;Gait training;Spoke to nursing;Spoke to  case management;OT   Progress Improving as expected   PT Frequency 2-3x/wk   Discharge Recommendation   Rehab Resource Intensity Level, PT III (Minimum Resource Intensity)   AM-PAC Basic Mobility Inpatient   Turning in Flat Bed Without Bedrails 3   Lying on Back to Sitting on Edge of Flat Bed Without Bedrails 3   Moving Bed to Chair 3   Standing Up From Chair Using Arms 3   Walk in Room 3   Climb 3-5 Stairs With Railing 3   Basic Mobility Inpatient Raw Score 18   Basic Mobility Standardized Score 41.05   [facility] Highest Level Of Mobility   -HLM Goal 6: Walk 10 steps or more   -HLM Achieved 7: Walk 25 feet or more   Education   Education Provided Home exercise program;Mobility training   Patient Explanation/teachback used   End of Consult   Patient Position at End of Consult Bedside chair;Bed/Chair alarm activated;All needs within reach   The patient's AM-PAC Basic Mobility Inpatient Short Form Raw Score is 18. A Raw score of greater than 16 suggests the patient may benefit from discharge to home. Please also refer to the recommendation of the Physical Therapist for safe discharge planning.    Grant Starkey, PT

## 2024-03-20 NOTE — ASSESSMENT & PLAN NOTE
[id number] score 5  Intermittent episodes of atrial flutter noted on telemetry.  Currently on heparin VTE/PE GGT-will consider transition to Eliquis today.

## 2024-03-20 NOTE — PLAN OF CARE
Problem: PHYSICAL THERAPY ADULT  Goal: Performs mobility at highest level of function for planned discharge setting.  See evaluation for individualized goals.  Description: Treatment/Interventions: Functional transfer training, LE strengthening/ROM, Elevations, Therapeutic exercise, Cognitive reorientation, Patient/family training, Equipment eval/education, Bed mobility, Gait training, Spoke to nursing, Spoke to case management, OT          See flowsheet documentation for full assessment, interventions and recommendations.  Note: Prognosis: Fair  Problem List: Decreased strength, Decreased endurance, Impaired balance, Decreased range of motion, Decreased mobility, Impaired judgement, Decreased cognition, Decreased safety awareness, Obesity, Decreased skin integrity, Orthopedic restrictions, Pain  Assessment: pt demonstrates progression of all aspects of mobility this session. demonstrates singificant improvement in ambulatory distances as compared to IE. Pt no longer requiring supplemental O2 and currently ambulates 100'x2 on RA with SP O2 of 95%. Pt tolerates ther ex fair, requires vc to increase ROM with AROM exercise. Overall pt demonstrates good progression toward pt and PT goals. Continue to reccomend  level III disposition at discharge.        Rehab Resource Intensity Level, PT: III (Minimum Resource Intensity)    See flowsheet documentation for full assessment.

## 2024-03-20 NOTE — PROGRESS NOTES
[facility]  Progress Note  Name: Stormy Hermosillo I  MRN: [id number]  Unit/Bed#: ICU 16 I Date of Admission: 3/18/2024   Date of Service: 3/20/2024 I Hospital Day: 2    Assessment/Plan   * Pulmonary embolism, bilateral (HCC)  Assessment & Plan  POA with worsening shortness of breath s/p right total knee arthroplasty on 3/12/2024. Reports taking aspirin 81 mg, twice daily since her procedure.  Venous duplex notable for evidence of acute nonocclusive DVT in the posterior tibial and peroneal veins.  Well-defined hypoechoic nonvascular cystic type structure, 5.6 cm noted in the popliteal fossa.  3/18: CTA PE: Extensive bilateral pulmonary emboli involving the main arteries and lower branches with some extending distally to the subsegmental level.  No discrete areas of parenchymal infarction. Calculated RV/LV ratio 1.14.  3/18: Venous duplex notable for evidence of acute nonocclusive DVT in the R posterior tibial and peroneal veins. Well-defined hypoechoic nonvascular cystic type structure, 5.6 cm noted in the popliteal fossa.  3/18: Echo EF 65%, G1 DD. Right ventricle is not well-visualized, cavity size is dilated. Normal TAPSE. RVSP 52 mmHg.  Etiology of PE, provoked in the setting of recent surgery.     Plan:  PESI score: Intermediate risk.  Heparin VTE/PE ggt -will consider transitioning to Eliquis today.  Monitor hemodynamics closely -should any change occur reengage PERT team for possible thrombectomy.    History of total knee arthroplasty, right  Assessment & Plan  S/p right total knee arthroplasty on 3/12/2024.   Will consider Ortho consult.    GERD (gastroesophageal reflux disease)  Assessment & Plan  Continue pantoprazole 40 mg daily in substitution for omeprazole.    Hyperlipidemia  Assessment & Plan  Resume home PTA Lipitor 10 mg.    Acute deep vein thrombosis (DVT) of distal vein of right lower extremity (HCC)  Assessment & Plan  3/18: Venous duplex notable for evidence of acute  nonocclusive DVT in the R posterior tibial and peroneal veins. Well-defined hypoechoic nonvascular cystic type structure, 5.6 cm noted in the popliteal fossa.    Atrial flutter, paroxysmal (HCC)  Assessment & Plan  [id number] score 5  Intermittent episodes of atrial flutter noted on telemetry.  Currently on heparin VTE/PE GGT-will consider transition to Eliquis today.             Disposition: Stepdown Level 1    ICU Core Measures     A: Assess, Prevent, and Manage Pain Has pain been assessed? Yes  Need for changes to pain regimen? No   B: Both SAT/SAT  N/A   C: Choice of Sedation RASS Goal: 0 Alert and Calm  Need for changes to sedation or analgesia regimen? No   D: Delirium CAM-ICU:    E: Early Mobility  Plan for early mobility? Yes   F: Family Engagement Plan for family engagement today? Yes         Prophylaxis:  VTE VTE covered by:  heparin (porcine), Intravenous, 16 Units/kg/hr at 03/20/24 0606  heparin (porcine), Intravenous  heparin (porcine), Intravenous       Stress Ulcer  covered byomeprazole (PriLOSEC) 20 mg delayed release capsule [[id number]] (Long-Term Med), pantoprazole (PROTONIX) EC tablet 40 mg [[id number]]         Significant 24hr Events     24hr events: Similar episodes of atrial flutter noted on telemetry, very similar to prior night.  Patient seen bedside this morning, no new complaints.     Subjective   Review of Systems   Constitutional:  Negative for chills and fever.   HENT:  Negative for sore throat.    Respiratory:  Negative for cough and shortness of breath.    Cardiovascular:  Negative for chest pain.   Gastrointestinal:  Negative for abdominal pain, constipation, diarrhea, nausea and vomiting.      Objective                            Vitals I/O      Most Recent Min/Max in 24hrs   Temp 98.9 °F (37.2 °C) Temp  Min: 98.3 °F (36.8 °C)  Max: 98.9 °F (37.2 °C)   Pulse 85 Pulse  Min: 76  Max: 95   Resp (!) 27 Resp  Min: 19  Max: 42   /77 BP  Min: 127/83  Max: 164/77   O2 Sat 94 % SpO2   Min: 89 %  Max: 96 %      Intake/Output Summary (Last 24 hours) at 3/20/2024 0817  Last data filed at 3/20/2024 0601  Gross per 24 hour   Intake 657.23 ml   Output 550 ml   Net 107.23 ml       Diet Regular; Regular House    Invasive Monitoring           Physical Exam   Physical Exam  Eyes:      Extraocular Movements: Extraocular movements intact.      Conjunctiva/sclera: Conjunctivae normal.   Skin:     General: Skin is warm.      Capillary Refill: Capillary refill takes less than 2 seconds.      Findings: Rash and wound present.      Comments: Healing surgical incisions on the right anterior knee, without erythema or drainage. Healing blister on the anterior knee. Right lower extremity with erythematous, shiny, smooth swollen up to the knees. Ecchymosis in the medial and lateral aspect of the ankle.    HENT:      Head: Normocephalic and atraumatic.      Mouth/Throat:      Mouth: Mucous membranes are moist.   Cardiovascular:      Rate and Rhythm: Normal rate and regular rhythm.   Musculoskeletal:      Right lower le+ Edema present.      Left lower leg: No edema.   Abdominal:      Palpations: Abdomen is soft.      Tenderness: There is no abdominal tenderness.   Constitutional:       General: She is not in acute distress.     Appearance: She is not ill-appearing.   Pulmonary:      Effort: Pulmonary effort is normal. No respiratory distress.      Breath sounds: Normal breath sounds.   Neurological:      Mental Status: She is alert and oriented to person, place and time.            Diagnostic Studies      EKG: None  Imaging: None      Medications:  Scheduled PRN   chlorhexidine, 15 mL, Q12H SUSY  lidocaine, 1 patch, Daily  pantoprazole, 40 mg, Early Morning      acetaminophen, 975 mg, Q6H PRN  heparin (porcine), 3,000 Units, Q6H PRN  heparin (porcine), 6,000 Units, Q6H PRN  HYDROmorphone, 0.2 mg, Q2H PRN  oxyCODONE, 2.5 mg, Q4H PRN   Or  oxyCODONE, 5 mg, Q4H PRN       Continuous    heparin (porcine), 3-30 Units/kg/hr  (Order-Specific), Last Rate: 16 Units/kg/hr (03/20/24 0606)         Labs:    CBC    Recent Labs     03/19/24  0430 03/20/24  0503   WBC 9.68 10.89*   HGB 10.6* 10.5*   HCT 33.5* 33.1*    336     BMP    Recent Labs     03/19/24  0430 03/20/24  0503   SODIUM 140 138   K 4.1 4.4    107   CO2 27 26   AGAP 6 5   BUN 11 16   CREATININE 0.63 0.79   CALCIUM 8.2* 8.1*       Coags    Recent Labs     03/18/24  1047 03/18/24  2031 03/19/24  0430 03/20/24  0503   INR 0.97  --   --   --    PTT 28   < > 82* 107*    < > = values in this interval not displayed.        Additional Electrolytes  No recent results       Blood Gas    No recent results  No recent results LFTs  Recent Labs     03/18/24  1047   ALT 27   AST 23   ALKPHOS 79   ALB 3.7   TBILI 0.91       Infectious  No recent results  Glucose  Recent Labs     03/18/24  1047 03/19/24  0430 03/20/24  0503   GLUC 105 89 92               Kleber Brown, DO

## 2024-03-20 NOTE — PLAN OF CARE
Problem: PAIN - ADULT  Goal: Verbalizes/displays adequate comfort level or baseline comfort level  Description: Interventions:  - Encourage patient to monitor pain and request assistance  - Assess pain using appropriate pain scale  - Administer analgesics based on type and severity of pain and evaluate response  - Implement non-pharmacological measures as appropriate and evaluate response  - Consider cultural and social influences on pain and pain management  - Notify physician/advanced practitioner if interventions unsuccessful or patient reports new pain  Outcome: Progressing     Problem: INFECTION - ADULT  Goal: Absence or prevention of progression during hospitalization  Description: INTERVENTIONS:  - Assess and monitor for signs and symptoms of infection  - Monitor lab/diagnostic results  - Monitor all insertion sites, i.e. indwelling lines, tubes, and drains  - Monitor endotracheal if appropriate and nasal secretions for changes in amount and color  - [place] appropriate cooling/warming therapies per order  - Administer medications as ordered  - Instruct and encourage patient and family to use good hand hygiene technique  - Identify and instruct in appropriate isolation precautions for identified infection/condition  Outcome: Progressing  Goal: Absence of fever/infection during neutropenic period  Description: INTERVENTIONS:  - Monitor WBC    Outcome: Progressing     Problem: SAFETY ADULT  Goal: Patient will remain free of falls  Description: INTERVENTIONS:  - Educate patient/family on patient safety including physical limitations  - Instruct patient to call for assistance with activity   - Consult OT/PT to assist with strengthening/mobility   - Keep Call bell within reach  - Keep bed low and locked with side rails adjusted as appropriate  - Keep care items and personal belongings within reach  - Initiate and maintain comfort rounds  - Make Fall Risk Sign visible to staff  - Offer Toileting every 2 Hours,  in advance of need  - Initiate/Maintain bed alarm  - Obtain necessary fall risk management equipment:   - Apply yellow socks and bracelet for high fall risk patients  - Consider moving patient to room near nurses station  Outcome: Progressing  Goal: Maintain or return to baseline ADL function  Description: INTERVENTIONS:  -  Assess patient's ability to carry out ADLs; assess patient's baseline for ADL function and identify physical deficits which impact ability to perform ADLs (bathing, care of mouth/teeth, toileting, grooming, dressing, etc.)  - Assess/evaluate cause of self-care deficits   - Assess range of motion  - Assess patient's mobility; develop plan if impaired  - Assess patient's need for assistive devices and provide as appropriate  - Encourage maximum independence but intervene and supervise when necessary  - Involve family in performance of ADLs  - Assess for home care needs following discharge   - Consider OT consult to assist with ADL evaluation and planning for discharge  - Provide patient education as appropriate  Outcome: Progressing  Goal: Maintains/Returns to pre admission functional level  Description: INTERVENTIONS:  - Perform AM-PAC 6 Click Basic Mobility/ Daily Activity assessment daily.  - Set and communicate daily mobility goal to care team and patient/family/caregiver.   - Collaborate with rehabilitation services on mobility goals if consulted  - Perform Range of Motion 8 times a day.  - Reposition patient every 2 hours.  - Dangle patient  times a day  - Stand patient  times a day  - Ambulate patient  times a day  - Out of bed to chair  times a day   - Out of bed for meals  times a day  - Out of bed for toileting  - Record patient progress and toleration of activity level   Outcome: Progressing     Problem: DISCHARGE PLANNING  Goal: Discharge to home or other facility with appropriate resources  Description: INTERVENTIONS:  - Identify barriers to discharge w/patient and caregiver  -  Arrange for needed discharge resources and transportation as appropriate  - Identify discharge learning needs (meds, wound care, etc.)  - Arrange for interpretive services to assist at discharge as needed  - Refer to Case Management Department for coordinating discharge planning if the patient needs post-hospital services based on physician/advanced practitioner order or complex needs related to functional status, cognitive ability, or social support system  Outcome: Progressing     Problem: Knowledge Deficit  Goal: Patient/family/caregiver demonstrates understanding of disease process, treatment plan, medications, and discharge instructions  Description: Complete learning assessment and assess knowledge base.  Interventions:  - Provide teaching at level of understanding  - Provide teaching via preferred learning methods  Outcome: Progressing     Problem: Prexisting or High Potential for Compromised Skin Integrity  Goal: Skin integrity is maintained or improved  Description: INTERVENTIONS:  - Identify patients at risk for skin breakdown  - Assess and monitor skin integrity  - Assess and monitor nutrition and hydration status  - Monitor labs   - Assess for incontinence   - Turn and reposition patient  - Assist with mobility/ambulation  - Relieve pressure over bony prominences  - Avoid friction and shearing  - Provide appropriate hygiene as needed including keeping skin clean and dry  - Evaluate need for skin moisturizer/barrier cream  - Collaborate with interdisciplinary team   - Patient/family teaching  - Consider wound care consult   Outcome: Progressing

## 2024-03-20 NOTE — PROGRESS NOTES
Critical Care Interval Transfer Note:    Please refer to progress note from earlier today for full details.   78 y.o. 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 CT notable for bilateral PE and venous duplex notable for R lower extremity DVT, started on heparin drip. PESI score: Intermediate risk. Echo findings as  EF 65%, G1 DD. Right ventricle is not well-visualized, cavity size is dilated. Normal TAPSE. RVSP 52 mmHg. She was transition to Eliquis 10 mg twice daily, today.  She was also noted to have paroxysmal atrial flutter on telemetry.    Barriers to discharge:   PT/OT eval        Consults: IP CONSULT TO CASE MANAGEMENT    Recommended to review admission imaging for incidental findings and document in discharge navigator: Chart reviewed, no known incidental findings noted at this time.      Discharge Plan: Anticipate discharge in 24-48 hrs to home.    CM met with pt and her , Jose Roberto, at bedside. Introduced self/role with dcp. Pt reports after her knee surgery a week ago she has been going to OP therapy with . Pt reports that her  is home to help her and assisting with transportation. Eliquis price check $44 dollars.     Patient seen and evaluated by Critical Care today and deemed to be appropriate for transfer to Med Surg. Spoke to Dr. Boone from [facility] to accept transfer. Critical care can be contacted via [employer] with any questions or concerns.

## 2024-03-20 NOTE — CONSULTS
Orthopedics   Stormy Hermosillo 78 y.o. female MRN: [id number]  Unit/Bed#: ICU 16      Chief Complaint:   Shortness of breath and RLE pain s/p right TKA on 3/12/2024    HPI:  78 y.o. female complaining of gradually increased shortness of breath and right lower extremity pain, swelling, and color change who presented to the ED on 3/18.  Patient is s/p right TKA with [facility] on 3/12/2024.  She was admitted to ICU after finding acute nonocclusive DVT in right posterior tibial and peroneal veins as well as extensive bilateral pulmonary emboli involving the main arteries and lower branches.  Placed on Heparin ggt.  Patient admits to taking Aspirin 81 mg BID since TKA.     Review Of Systems:   Skin: RLE TKA surgical incision with extensive erythema and edema of RLE  Neuro: See HPI  Musculoskeletal: See HPI  14 point review of systems negative except as stated above     Past Medical History:   History reviewed. No pertinent past medical history.    Past Surgical History:   Past Surgical History:   Procedure Laterality Date    BACK SURGERY      BREAST SURGERY      FOOT SURGERY      HIP SURGERY Left     KNEE SURGERY Left        Family History:  Family history reviewed and non-contributory  Family History   Problem Relation Age of Onset    Heart disease Mother     Cancer Mother     Varicose Veins Mother     Heart disease Father     Heart disease Sister     Cancer Sister     Cancer Brother     Varicose Veins Maternal Grandmother        Social History:  Social History     Socioeconomic History    Marital status: /Civil Union     Spouse name: None    Number of children: None    Years of education: None    Highest education level: None   Occupational History    None   Tobacco Use    Smoking status: Never    Smokeless tobacco: Never   Vaping Use    Vaping status: Never Used   Substance and Sexual Activity    Alcohol use: Yes     Comment: rarely    Drug use: No    Sexual activity: None   Other Topics Concern    None   Social  History Narrative    None     Social Determinants of Health     Financial Resource Strain: Low Risk  (3/12/2024)    Received from [facility]    Overall Financial Resource Strain (CARDIA)     Difficulty of Paying Living Expenses: Not hard at all   Food Insecurity: No Food Insecurity (3/19/2024)    Hunger Vital Sign     Worried About Running Out of Food in the Last Year: Never true     Ran Out of Food in the Last Year: Never true   Transportation Needs: No Transportation Needs (3/19/2024)    PRAPARE - Transportation     Lack of Transportation (Medical): No     Lack of Transportation (Non-Medical): No   Physical Activity: Not on file   Stress: Not on file   Social Connections: Not on file   Intimate Partner Violence: Not At Risk (3/12/2024)    Received from [facility]    Humiliation, Afraid, Rape, and Kick questionnaire     Fear of Current or Ex-Partner: No     Emotionally Abused: No     Physically Abused: No     Sexually Abused: No   Housing Stability: Unknown (3/19/2024)    Housing Stability Vital Sign     Unable to Pay for Housing in the Last Year: No     Number of Places Lived in the Last Year: Not on file     Unstable Housing in the Last Year: No       Allergies:   Allergies   Allergen Reactions    Tramadol GI Intolerance           Labs:  0   Lab Value Date/Time    HCT 33.1 (L) 03/20/2024 0503    HCT 33.5 (L) 03/19/2024 0430    HCT 36.1 03/18/2024 1047    HGB 10.5 (L) 03/20/2024 0503    HGB 10.6 (L) 03/19/2024 0430    HGB 11.4 (L) 03/18/2024 1047    INR 0.97 03/18/2024 1047    WBC 10.89 (H) 03/20/2024 0503    WBC 9.68 03/19/2024 0430    WBC 9.21 03/18/2024 1047    ESR 7 03/14/2018 0822       Meds:    Current Facility-Administered Medications:     acetaminophen (TYLENOL) tablet 975 mg, 975 mg, Oral, Q6H PRN, Basanta Stephanie, DO, 975 mg at 03/20/24 1346    apixaban (ELIQUIS) tablet 10 mg, 10 mg, Oral, BID, 10 mg at 03/20/24 1345 **FOLLOWED BY** [START ON 3/27/2024] apixaban  "(ELIQUIS) tablet 5 mg, 5 mg, Oral, BID, Sarwat Galvan MD    chlorhexidine (PERIDEX) 0.12 % oral rinse 15 mL, 15 mL, Mouth/Throat, Q12H SUSY, MARIANNA Doherty, 15 mL at 03/20/24 0835    HYDROmorphone HCl (DILAUDID) injection 0.2 mg, 0.2 mg, Intravenous, Q2H PRN, Basanta Stephanie, DO    lidocaine (LIDODERM) 5 % patch 1 patch, 1 patch, Topical, Daily, Basanta Stephanie, DO, 1 patch at 03/19/24 0807    oxyCODONE (ROXICODONE) split tablet 2.5 mg, 2.5 mg, Oral, Q4H PRN, 2.5 mg at 03/19/24 0805 **OR** oxyCODONE (ROXICODONE) IR tablet 5 mg, 5 mg, Oral, Q4H PRN, Basanta Stephanie, DO    pantoprazole (PROTONIX) EC tablet 40 mg, 40 mg, Oral, Early Morning, Sarwat Galvan MD, 40 mg at 03/20/24 0549    Blood Culture:   No results found for: \"BLOODCX\"    Wound Culture:   No results found for: \"WOUNDCULT\"    Ins and Outs:  I/O last 24 hours:  In: 800.9 [P.O.:360; I.V.:440.9]  Out: 550 [Urine:550]          Physical Exam:   /60   Pulse 95   Temp 97.7 °F (36.5 °C)   Resp 22   Ht 5' 6\" (1.676 m)   Wt 81.6 kg (179 lb 14.3 oz)   SpO2 92%   BMI 29.04 kg/m²   Musculoskeletal: right lower extremity  Skin of RLE erythematous and edematous with visible healing TKA surgical incision.    TTP over RLE  SILT s/s/sp/dp/t.   Leg lengths equal    Tertiary: no tenderness over all other joints/long bones as except already stated.    Radiology:   3/18: VAS lower limb venous duplex study  RLE: Evidence of acute non-occlusive deep vein thrombosis noted in the paired posterior tibial and peroneal veins.   No evidence of superficial thrombophlebitis noted.  Doppler evaluation shows a normal response to augmentation maneuvers.  Popliteal, posterior tibial and anterior tibial arterial Doppler waveforms are  triphasic.  There is a well defined hypoechoic non-vascularized cystic-type structure, 5.6 cm noted in the popliteal fossa.    3/18: CTA PE study  Thin saddle embolus in the main pulmonary arteries. Sweeny central embolus in " the distal main left and right arteries with extension into multiple lobar, segmental, and subsegmental branches with involvement of all lobes.   Scattered areas of atelectasis in both lungs, greatest dependently in the posterior lower lobes.     Assessment:  78 y.o.female POD 8 s/p right total knee arthroplasty with [facility] provider presenting with multiple embolic events including DVT of right posterior tibial and peroneal veins and multiple bilateral PE.     Plan:   WBAT RLE  Monitor labs, continue DVT medications per primary team   Continue outpatient PT  Pain control per primary team  Dispo: Ortho will follow - no orthopedic intervention required at this time     Annel Guthrie PA-C

## 2024-03-20 NOTE — UTILIZATION REVIEW
NOTIFICATION OF INPATIENT ADMISSION   AUTHORIZATION REQUEST   SERVICING FACILITY:   [place] [address]  Tax ID: [id number]  NPI: [id number]   ATTENDING PROVIDER:  Attending Name and NPI#: Geraldo Alves Do [[id number]]  Address: [address] [place] [address]  Phone: [phone]     ADMISSION INFORMATION:  Place of Service: Inpatient [facility]  Place of Service Code: 21  Inpatient Admission Date/Time: 3/18/24  3:06 PM  Discharge Date/Time: No discharge date for patient encounter.  Admitting Diagnosis Code/Description:  Pulmonary embolism (HCC) [I26.99]  Hypoxia [R09.02]  Elevated brain natriuretic peptide (BNP) level [R79.89]  Right leg swelling [M79.89]     UTILIZATION REVIEW CONTACT:  Joseph Peace Utilization   Network Utilization Review Department  Phone: [phone]  Fax: [phone]  Email: [email or web address]  Contact for approvals/pending authorizations, clinical reviews, and discharge.     PHYSICIAN ADVISORY SERVICES:  Medical Necessity Denial & Zkbr-jt-Cbec Review  Phone: [phone]  Fax: [phone]  Email: [email or web address]     DISCHARGE SUPPORT TEAM:  For Patients Discharge Needs & Updates  Phone: [phone] opt. 2 Fax: [phone]  Email: [email or web address]

## 2024-03-20 NOTE — OCCUPATIONAL THERAPY NOTE
Occupational Therapy Eval Only      Patient Name: Stormy Hermosillo  Today's Date: 3/20/2024  Problem List  Principal Problem:    Pulmonary embolism, bilateral (HCC)  Active Problems:    History of total knee arthroplasty, right    GERD (gastroesophageal reflux disease)    Hyperlipidemia    Acute deep vein thrombosis (DVT) of distal vein of right lower extremity (HCC)    Atrial flutter, paroxysmal (HCC)    Past Medical History  History reviewed. No pertinent past medical history.  Past Surgical History  Past Surgical History:   Procedure Laterality Date    BACK SURGERY      BREAST SURGERY      FOOT SURGERY      HIP SURGERY Left     KNEE SURGERY Left          03/20/24 1247   OT Last Visit   OT Visit Date 03/20/24   Note Type   Note type Evaluation   Pain Assessment   Pain Assessment Tool 0-10   Pain Score 4   Pain Location/Orientation Orientation: Right;Location: Knee   Pain Onset/Description Onset: Ongoing;Descriptor: Aching   Effect of Pain on Daily Activities limits comfort, activity tolerance, speed of ADLs/mobility   Patient's Stated Pain Goal No pain   Hospital Pain Intervention(s) Repositioned;Ambulation/increased activity;Elevated;Emotional support   Restrictions/Precautions   Weight Bearing Precautions Per Order Yes   RLE Weight Bearing Per Order WBAT   LLE Weight Bearing Per Order   (s/p R TKA 3/12)   Other Precautions Chair Alarm;Bed Alarm;WBS;Multiple lines;Telemetry;Fall Risk;Pain   Home Living   Type of Home House   Home Layout One level;Performs ADLs on one level;Able to live on main level with bedroom/bathroom  (1 DENISE)   Bathroom Shower/Tub Walk-in shower   Bathroom Toilet Raised   Bathroom Equipment Grab bars in shower;Shower chair;Commode   Bathroom Accessibility Accessible via walker   Home Equipment Walker;Wheelchair-manual;Cane;Crutches;Reacher;Sock aid;Long-handled shoehorn   Prior Function   Level of [place] Independent with functional mobility;Needs assistance with IADLS;Independent  "with ADLs  (since TKA pt has been requiring light A w/ ADLS)   Lives With Spouse   Receives Help From Family   IADLs Independent with driving;Independent with meal prep;Independent with medication management  (most recently spouse has been completing IADLs but (I) at true baseline)   Falls in the last 6 months 0   Vocational Retired   Comments Pt is fully (I) at true baseline. Since TKA, pt has been requiring light A for LB ADLs. Using SPC for mobility and attending OPPT.   Lifestyle   Autonomy Pt lives w/ spouse in a 1 level house and is fully (I) PTA, SPC, (-) falls, (+)    Reciprocal Relationships Supportive spouse   Service to Others Retired   Intrinsic Gratification Pt enjoys spending time w/ her 3 dogs   General   Additional Pertinent History Pt admitted w/ SOB following recent R TKA on 3/12. CT notable for bilateral PE and venous duplex notable for R lower extremity DVT, started on heparin. PMH includes: hx of [place], HLD, venous insufficiency of BLEs   Family/Caregiver Present No   Subjective   Subjective \"I was doing good until I was SOB\"   ADL   Where Assessed Chair   Eating Assistance 7  Independent   Eating Deficit Setup;Beverage management   Grooming Assistance 6  Modified Independent   Grooming Deficit Setup;Wash/dry hands;Standing with assistive device  (standing at sink in bathroom)   UB Bathing Assistance 6  Modified Independent   LB Bathing Assistance 5  Supervision/Setup   UB Dressing Assistance 6  Modified independent   LB Dressing Assistance 5  Supervision/Setup   LB Dressing Deficit Setup;Requires assistive device for steadying;Verbal cueing;Supervision/safety;Increased time to complete;Thread RLE into underwear;Thread LLE into underwear;Pull up over hips  (sitting in recliner, VC for compensatory technique)   Toileting Assistance  5  Supervision/Setup   Bed Mobility   Supine to Sit 5  Supervision   Additional items Assist x 1;HOB elevated;Bedrails;Increased time required "   Additional Comments OOB to recliner at end of session   Transfers   Sit to Stand 5  Supervision   Additional items Assist x 1;Increased time required;Verbal cues   Stand to Sit 5  Supervision   Additional items Assist x 1;Increased time required;Verbal cues   Toilet transfer 5  Supervision   Additional items Assist x 1;Trapeze bar;Verbal cues;Commode;Armrests  (BSC over toilet)   Additional Comments Good hand placement, safety   Functional Mobility   Functional Mobility 5  Supervision   Additional Comments Pt elected to use RW today. S for short household distance to bathroom then recliner w/ RW. Denies SOB   Additional items Rolling walker   Balance   Static Sitting Fair +   Dynamic Sitting Fair   Static Standing Fair   Dynamic Standing Fair -   Activity Tolerance   Activity Tolerance Patient limited by fatigue   Medical Staff Made Aware Spoke to PT CANDIE Rios   Nurse Made Aware yes, RN Marietta agarwal to see pt and updated   RUE Assessment   RUE Assessment WFL   LUE Assessment   LUE Assessment WFL   Hand Function   Gross Motor Coordination Functional   Fine Motor Coordination Functional   Sensation   Light Touch No apparent deficits   Vision-Basic Assessment   Current Vision Wears glasses only for reading   Cognition   Overall Cognitive Status WFL   Arousal/Participation Cooperative;Alert   Attention Within functional limits   Orientation Level Oriented X4   Memory Within functional limits   Following Commands Follows multistep commands with increased time or repetition   Comments Pleasant and agreeable, good insight, safety, carryover of education.   Assessment   Limitation   (pain, fxnl mobility, act leticia, fxnl reach, and standing leticia)   Prognosis Good   Assessment Pt is a 78 y.o. female seen for OT evaluation s/p admit to [facility] on 3/18/2024 w/Pulmonary embolism, bilateral (HCC). Prior to admission, pt was living with spouse in a 1 level ranch, light A with ADLs, (A) with IADLs, (-) falls, (+)  . Personal and environmental factors affecting patient at time of evaluation include difficulty completing IADLs. Personal factors supporting patient at time of evaluation include age, (I) PLOF, supportive spouse, attitude towards recovery, accessible home environment, and FFSU. Based upon this evaluation, pt is functioning slightly below true baseline due recent R TKA impacting speed/quality of ADLs. No further acute OT needs identified at this time as pt has adequate equipment and support for IADL completion. Recommend continued active ADL participation and mobilization with hospital staff while in the hospital to increase pt’s endurance and strength upon D/C. From OT standpoint, recommend D/C to home with family support when medically cleared. D/C pt from OT caseload at this time.   Goals   Patient Goals to go home and get back to PT   Plan   OT Frequency Eval only   Discharge Recommendation   Rehab Resource Intensity Level, OT No post-acute rehabilitation needs  (continued social support for IADL completion)   Additional Comments  The patient's raw score on the AM-PAC Daily Activity Inpatient Short Form is 20. A raw score of greater than or equal to 19 suggests the patient may benefit from discharge to home. Please refer to the recommendation of the Occupational Therapist for safe discharge planning.   AM-PAC Daily Activity Inpatient   Lower Body Dressing 3   Bathing 3   Toileting 3   Upper Body Dressing 4   Grooming 3   Eating 4   Daily Activity Raw Score 20   Daily Activity Standardized Score (Calc for Raw Score >=11) 42.03   AM-PAC Applied Cognition Inpatient   Following a Speech/Presentation 4   Understanding Ordinary Conversation 4   Taking Medications 4   Remembering Where Things Are Placed or Put Away 4   Remembering List of 4-5 Errands 4   Taking Care of Complicated Tasks 4   Applied Cognition Raw Score 24   Applied Cognition Standardized Score 62.21   End of Consult   Patient Position at End of  Consult Bedside chair;Bed/Chair alarm activated;All needs within reach   Nurse Communication Nurse aware of consult     DERREK Case, OTR/L  [place] License [id number]  [place] License [id number]

## 2024-03-20 NOTE — PROGRESS NOTES
Critical Care Attending Note     78 year old woman with HLP and OA, presented 1 week after right TKR for increasing dyspnea. Found to have submassive VTE and RLE DVT. Started on UFH gtt in ED.  PERT alert called and plan for systemic AC for now and monitoring.       24hr events - feeling improved, weaned to RA, pleuritic pain resolved, slight epistaxis resolved w/o intervention    VS AF, HR 70-80's, MAPS 's, SpO2 95% RA, I/Os +160cc  Exam  GEN obese older woman walking with physical therapy, nontoxic, conversant  HEENT ATNC, MMM, no thrush  NECK no accessory muscle use  CV reg, single s1/2, no m/r  Pulm clear, no wheeze, no rales, no pleural rubs  ABD +BS soft NTND, no rebound  EXT warm, brisk cap refill, right knee incision CDI, 1-2+ LE edema    Laboratory and Diagnostics  Results from last 7 days   Lab Units 03/20/24  0503 03/19/24 0430 03/18/24  1047   WBC Thousand/uL 10.89* 9.68 9.21   HEMOGLOBIN g/dL 10.5* 10.6* 11.4*   HEMATOCRIT % 33.1* 33.5* 36.1   PLATELETS Thousands/uL 336 327 327   NEUTROS PCT %  --   --  59   MONOS PCT %  --   --  12   EOS PCT %  --   --  2     Results from last 7 days   Lab Units 03/20/24  0503 03/19/24  0430 03/18/24  1047   SODIUM mmol/L 138 140 139   POTASSIUM mmol/L 4.4 4.1 4.1   CHLORIDE mmol/L 107 107 105   CO2 mmol/L 26 27 25   ANION GAP mmol/L 5 6 9   BUN mg/dL 16 11 18   CREATININE mg/dL 0.79 0.63 0.71   CALCIUM mg/dL 8.1* 8.2* 8.8   GLUCOSE RANDOM mg/dL 92 89 105   ALT U/L  --   --  27   AST U/L  --   --  23   ALK PHOS U/L  --   --  79   ALBUMIN g/dL  --   --  3.7   TOTAL BILIRUBIN mg/dL  --   --  0.91          Results from last 7 days   Lab Units 03/20/24  0503 03/19/24  0430 03/18/24 2031 03/18/24  1047   INR   --   --   --  0.97   PTT seconds 107* 82* 90* 28      , HS Trop 19     MICRO  none     RADIOGRAPHS - images personally reviewed  CT angio 3/18 - bilateral PEs with thin strand saddle PE, RV/LV 1.14, no effusions, basilar atelectasis     TTE 3/2024 EF  65%, grade I diastolic dysfunction, RV dilated poor visualization, RVSP 52, normal TAPSE     LE US 3/18 - nonocclusive thrombus post tibial and peroneal veins, neg for LLE DVT     Assessment   Acute hypoxic respiratory failure  Provoke submassive saddle PE and RLE DVT  Mild RV dysfunction  Pleurisy secondary to #1  Recent right total knee replacement  Transient Afib episodes     PLAN  Clinically improving  Transition UFH gtt to eliquis now  Weaned off O2  Continue PT/OT  ADAT  Cont PPI  May resume BB  Stable for downgrade from ICU, MS with telemetry  Pulmonary to follow in AM  Hopeful for d/c in next 24 hours    Geraldo Alves, DO

## 2024-03-20 NOTE — ASSESSMENT & PLAN NOTE
POA with worsening shortness of breath s/p right total knee arthroplasty on 3/12/2024. Reports taking aspirin 81 mg, twice daily since her procedure.  Venous duplex notable for evidence of acute nonocclusive DVT in the posterior tibial and peroneal veins.  Well-defined hypoechoic nonvascular cystic type structure, 5.6 cm noted in the popliteal fossa.  3/18: CTA PE: Extensive bilateral pulmonary emboli involving the main arteries and lower branches with some extending distally to the subsegmental level.  No discrete areas of parenchymal infarction. Calculated RV/LV ratio 1.14.  3/18: Venous duplex notable for evidence of acute nonocclusive DVT in the R posterior tibial and peroneal veins. Well-defined hypoechoic nonvascular cystic type structure, 5.6 cm noted in the popliteal fossa.  3/18: Echo EF 65%, G1 DD. Right ventricle is not well-visualized, cavity size is dilated. Normal TAPSE. RVSP 52 mmHg.  Etiology of PE, provoked in the setting of recent surgery.     Plan:  PESI score: Intermediate risk.  Heparin VTE/PE ggt -will consider transitioning to Eliquis today.  Monitor hemodynamics closely -should any change occur reengage PERT team for possible thrombectomy.

## 2024-03-21 VITALS
WEIGHT: 179.68 LBS | OXYGEN SATURATION: 95 % | DIASTOLIC BLOOD PRESSURE: 72 MMHG | BODY MASS INDEX: 28.88 KG/M2 | TEMPERATURE: 98.1 F | SYSTOLIC BLOOD PRESSURE: 136 MMHG | HEIGHT: 66 IN | RESPIRATION RATE: 18 BRPM | HEART RATE: 81 BPM

## 2024-03-21 PROCEDURE — 99239 HOSP IP/OBS DSCHRG MGMT >30: CPT | Performed by: HOSPITALIST

## 2024-03-21 PROCEDURE — 99232 SBSQ HOSP IP/OBS MODERATE 35: CPT | Performed by: INTERNAL MEDICINE

## 2024-03-21 PROCEDURE — 99231 SBSQ HOSP IP/OBS SF/LOW 25: CPT | Performed by: STUDENT IN AN ORGANIZED HEALTH CARE EDUCATION/TRAINING PROGRAM

## 2024-03-21 RX ORDER — METOPROLOL SUCCINATE 25 MG/1
25 TABLET, EXTENDED RELEASE ORAL DAILY
Status: DISCONTINUED | OUTPATIENT
Start: 2024-03-21 | End: 2024-03-21 | Stop reason: HOSPADM

## 2024-03-21 RX ORDER — METOPROLOL SUCCINATE 25 MG/1
25 TABLET, EXTENDED RELEASE ORAL DAILY
Qty: 30 TABLET | Refills: 0 | Status: SHIPPED | OUTPATIENT
Start: 2024-03-21

## 2024-03-21 RX ADMIN — ACETAMINOPHEN 975 MG: 325 TABLET, FILM COATED ORAL at 06:43

## 2024-03-21 RX ADMIN — METOPROLOL SUCCINATE 25 MG: 25 TABLET, EXTENDED RELEASE ORAL at 11:46

## 2024-03-21 RX ADMIN — APIXABAN 10 MG: 5 TABLET, FILM COATED ORAL at 09:31

## 2024-03-21 RX ADMIN — PANTOPRAZOLE SODIUM 40 MG: 40 TABLET, DELAYED RELEASE ORAL at 06:36

## 2024-03-21 RX ADMIN — CHLORHEXIDINE GLUCONATE 15 ML: 1.2 SOLUTION ORAL at 09:31

## 2024-03-21 NOTE — ASSESSMENT & PLAN NOTE
POA with worsening shortness of breath s/p right total knee arthroplasty on 3/12/2024. Reports taking aspirin 81 mg, twice daily since her procedure.  Venous duplex notable for evidence of acute nonocclusive DVT in the posterior tibial and peroneal veins.  Well-defined hypoechoic nonvascular cystic type structure, 5.6 cm noted in the popliteal fossa.  3/18: CTA PE: Extensive bilateral pulmonary emboli involving the main arteries and lower branches with some extending distally to the subsegmental level.  No discrete areas of parenchymal infarction. Calculated RV/LV ratio 1.14.  3/18: Venous duplex notable for evidence of acute nonocclusive DVT in the R posterior tibial and peroneal veins. Well-defined hypoechoic nonvascular cystic type structure, 5.6 cm noted in the popliteal fossa.  3/18: Echo EF 65%, G1 DD. Right ventricle is not well-visualized, cavity size is dilated. Normal TAPSE. RVSP 52 mmHg.  Etiology of PE, provoked in the setting of recent surgery.     Plan:  PESI score: Intermediate risk.  Eliquis loading dose  Monitor hemodynamics closely -should any change occur reengage PERT team for possible thrombectomy.

## 2024-03-21 NOTE — ASSESSMENT & PLAN NOTE
[id number] score 5  Intermittent episodes of atrial flutter noted on telemetry.  Eliquis daily  Follow up with cardiology as an outpatient

## 2024-03-21 NOTE — ASSESSMENT & PLAN NOTE
POA with worsening shortness of breath s/p right total knee arthroplasty on 3/12/2024. Reports taking aspirin 81 mg, twice daily since her procedure.  Venous duplex notable for evidence of acute nonocclusive DVT in the posterior tibial and peroneal veins.  Well-defined hypoechoic nonvascular cystic type structure, 5.6 cm noted in the popliteal fossa.  3/18: CTA PE: Extensive bilateral pulmonary emboli involving the main arteries and lower branches with some extending distally to the subsegmental level.  No discrete areas of parenchymal infarction. Calculated RV/LV ratio 1.14.  3/18: Venous duplex notable for evidence of acute nonocclusive DVT in the R posterior tibial and peroneal veins. Well-defined hypoechoic nonvascular cystic type structure, 5.6 cm noted in the popliteal fossa.  3/18: Echo EF 65%, G1 DD. Right ventricle is not well-visualized, cavity size is dilated. Normal TAPSE. RVSP 52 mmHg.  Etiology of PE, provoked in the setting of recent surgery.     Plan:  Continue Eliquis 10 mg twice daily for 1 week, then transition to 5 mg twice daily

## 2024-03-21 NOTE — PLAN OF CARE
Problem: PAIN - ADULT  Goal: Verbalizes/displays adequate comfort level or baseline comfort level  Description: Interventions:  - Encourage patient to monitor pain and request assistance  - Assess pain using appropriate pain scale  - Administer analgesics based on type and severity of pain and evaluate response  - Implement non-pharmacological measures as appropriate and evaluate response  - Consider cultural and social influences on pain and pain management  - Notify physician/advanced practitioner if interventions unsuccessful or patient reports new pain  Outcome: Progressing     Problem: INFECTION - ADULT  Goal: Absence or prevention of progression during hospitalization  Description: INTERVENTIONS:  - Assess and monitor for signs and symptoms of infection  - Monitor lab/diagnostic results  - Monitor all insertion sites, i.e. indwelling lines, tubes, and drains  - Monitor endotracheal if appropriate and nasal secretions for changes in amount and color  - [place] appropriate cooling/warming therapies per order  - Administer medications as ordered  - Instruct and encourage patient and family to use good hand hygiene technique  - Identify and instruct in appropriate isolation precautions for identified infection/condition  Outcome: Progressing  Goal: Absence of fever/infection during neutropenic period  Description: INTERVENTIONS:  - Monitor WBC    Outcome: Progressing     Problem: SAFETY ADULT  Goal: Patient will remain free of falls  Description: INTERVENTIONS:  - Educate patient/family on patient safety including physical limitations  - Instruct patient to call for assistance with activity   - Consult OT/PT to assist with strengthening/mobility   - Keep Call bell within reach  - Keep bed low and locked with side rails adjusted as appropriate  - Keep care items and personal belongings within reach  - Initiate and maintain comfort rounds  - Make Fall Risk Sign visible to staff  - Offer Toileting every 2 Hours,  in advance of need  - Initiate/Maintain bed/chair alarm  - Obtain necessary fall risk management equipment: yellow socks  - Apply yellow socks and bracelet for high fall risk patients  - Consider moving patient to room near nurses station  Outcome: Progressing  Goal: Maintain or return to baseline ADL function  Description: INTERVENTIONS:  -  Assess patient's ability to carry out ADLs; assess patient's baseline for ADL function and identify physical deficits which impact ability to perform ADLs (bathing, care of mouth/teeth, toileting, grooming, dressing, etc.)  - Assess/evaluate cause of self-care deficits   - Assess range of motion  - Assess patient's mobility; develop plan if impaired  - Assess patient's need for assistive devices and provide as appropriate  - Encourage maximum independence but intervene and supervise when necessary  - Involve family in performance of ADLs  - Assess for home care needs following discharge   - Consider OT consult to assist with ADL evaluation and planning for discharge  - Provide patient education as appropriate  Outcome: Progressing  Goal: Maintains/Returns to pre admission functional level  Description: INTERVENTIONS:  - Perform AM-PAC 6 Click Basic Mobility/ Daily Activity assessment daily.  - Set and communicate daily mobility goal to care team and patient/family/caregiver.   - Collaborate with rehabilitation services on mobility goals if consulted  - Perform Range of Motion   - Reposition patient every 2 hours.  - Ambulate patient 3 times a day  - Out of bed to chair 3 times a day   - Out of bed for meals 3 times a day  - Out of bed for toileting  - Record patient progress and toleration of activity level   Outcome: Progressing     Problem: DISCHARGE PLANNING  Goal: Discharge to home or other facility with appropriate resources  Description: INTERVENTIONS:  - Identify barriers to discharge w/patient and caregiver  - Arrange for needed discharge resources and transportation as  appropriate  - Identify discharge learning needs (meds, wound care, etc.)  - Arrange for interpretive services to assist at discharge as needed  - Refer to Case Management Department for coordinating discharge planning if the patient needs post-hospital services based on physician/advanced practitioner order or complex needs related to functional status, cognitive ability, or social support system  Outcome: Progressing     Problem: Knowledge Deficit  Goal: Patient/family/caregiver demonstrates understanding of disease process, treatment plan, medications, and discharge instructions  Description: Complete learning assessment and assess knowledge base.  Interventions:  - Provide teaching at level of understanding  - Provide teaching via preferred learning methods  Outcome: Progressing     Problem: Prexisting or High Potential for Compromised Skin Integrity  Goal: Skin integrity is maintained or improved  Description: INTERVENTIONS:  - Identify patients at risk for skin breakdown  - Assess and monitor skin integrity  - Assess and monitor nutrition and hydration status  - Monitor labs   - Assess for incontinence   - Turn and reposition patient  - Assist with mobility/ambulation  - Relieve pressure over bony prominences  - Avoid friction and shearing  - Provide appropriate hygiene as needed including keeping skin clean and dry  - Evaluate need for skin moisturizer/barrier cream  - Collaborate with interdisciplinary team   - Patient/family teaching  - Consider wound care consult   Outcome: Progressing

## 2024-03-21 NOTE — ASSESSMENT & PLAN NOTE
S/p right total knee arthroplasty on 3/12/2024.   No surgical intervention, will follow outpatient with ortho

## 2024-03-21 NOTE — PLAN OF CARE
Problem: PAIN - ADULT  Goal: Verbalizes/displays adequate comfort level or baseline comfort level  Description: Interventions:  - Encourage patient to monitor pain and request assistance  - Assess pain using appropriate pain scale  - Administer analgesics based on type and severity of pain and evaluate response  - Implement non-pharmacological measures as appropriate and evaluate response  - Consider cultural and social influences on pain and pain management  - Notify physician/advanced practitioner if interventions unsuccessful or patient reports new pain  Outcome: Progressing     Problem: INFECTION - ADULT  Goal: Absence or prevention of progression during hospitalization  Description: INTERVENTIONS:  - Assess and monitor for signs and symptoms of infection  - Monitor lab/diagnostic results  - Monitor all insertion sites, i.e. indwelling lines, tubes, and drains  - Monitor endotracheal if appropriate and nasal secretions for changes in amount and color  - [place] appropriate cooling/warming therapies per order  - Administer medications as ordered  - Instruct and encourage patient and family to use good hand hygiene technique  - Identify and instruct in appropriate isolation precautions for identified infection/condition  Outcome: Progressing  Goal: Absence of fever/infection during neutropenic period  Description: INTERVENTIONS:  - Monitor WBC    Outcome: Progressing     Problem: SAFETY ADULT  Goal: Patient will remain free of falls  Description: INTERVENTIONS:  - Educate patient/family on patient safety including physical limitations  - Instruct patient to call for assistance with activity   - Consult OT/PT to assist with strengthening/mobility   - Keep Call bell within reach  - Keep bed low and locked with side rails adjusted as appropriate  - Keep care items and personal belongings within reach  - Initiate and maintain comfort rounds  - Make Fall Risk Sign visible to staff  - Offer Toileting every 2 Hours,  in advance of need  - Initiate/Maintain bed alarm  - Obtain necessary fall risk management equipment:   - Apply yellow socks and bracelet for high fall risk patients  - Consider moving patient to room near nurses station  Outcome: Progressing  Goal: Maintain or return to baseline ADL function  Description: INTERVENTIONS:  -  Assess patient's ability to carry out ADLs; assess patient's baseline for ADL function and identify physical deficits which impact ability to perform ADLs (bathing, care of mouth/teeth, toileting, grooming, dressing, etc.)  - Assess/evaluate cause of self-care deficits   - Assess range of motion  - Assess patient's mobility; develop plan if impaired  - Assess patient's need for assistive devices and provide as appropriate  - Encourage maximum independence but intervene and supervise when necessary  - Involve family in performance of ADLs  - Assess for home care needs following discharge   - Consider OT consult to assist with ADL evaluation and planning for discharge  - Provide patient education as appropriate  Outcome: Progressing  Goal: Maintains/Returns to pre admission functional level  Description: INTERVENTIONS:  - Perform AM-PAC 6 Click Basic Mobility/ Daily Activity assessment daily.  - Set and communicate daily mobility goal to care team and patient/family/caregiver.   - Collaborate with rehabilitation services on mobility goals if consulted  - Perform Range of Motion  times a day.  - Reposition patient every 2 hours.  - Dangle patient  times a day  - Stand patient  times a day  - Ambulate patient  times a day  - Out of bed to chair  times a day   - Out of bed for meals  times a day  - Out of bed for toileting  - Record patient progress and toleration of activity level   Outcome: Progressing     Problem: DISCHARGE PLANNING  Goal: Discharge to home or other facility with appropriate resources  Description: INTERVENTIONS:  - Identify barriers to discharge w/patient and caregiver  - Arrange  for needed discharge resources and transportation as appropriate  - Identify discharge learning needs (meds, wound care, etc.)  - Arrange for interpretive services to assist at discharge as needed  - Refer to Case Management Department for coordinating discharge planning if the patient needs post-hospital services based on physician/advanced practitioner order or complex needs related to functional status, cognitive ability, or social support system  Outcome: Progressing     Problem: Knowledge Deficit  Goal: Patient/family/caregiver demonstrates understanding of disease process, treatment plan, medications, and discharge instructions  Description: Complete learning assessment and assess knowledge base.  Interventions:  - Provide teaching at level of understanding  - Provide teaching via preferred learning methods  Outcome: Progressing     Problem: Prexisting or High Potential for Compromised Skin Integrity  Goal: Skin integrity is maintained or improved  Description: INTERVENTIONS:  - Identify patients at risk for skin breakdown  - Assess and monitor skin integrity  - Assess and monitor nutrition and hydration status  - Monitor labs   - Assess for incontinence   - Turn and reposition patient  - Assist with mobility/ambulation  - Relieve pressure over bony prominences  - Avoid friction and shearing  - Provide appropriate hygiene as needed including keeping skin clean and dry  - Evaluate need for skin moisturizer/barrier cream  - Collaborate with interdisciplinary team   - Patient/family teaching  - Consider wound care consult   Outcome: Progressing

## 2024-03-21 NOTE — PROGRESS NOTES
"Progress Note - Pulmonary   Stormy Nolascoried 78 y.o. female MRN: [id number]  Unit/Bed#: S -01 Encounter: [id number]    Assessment/Plan:    1.  Acute pulmonary sufficiency likely multifaceted as listed below        -On room air 94%, patient does not wear home O2        -Continue saturations greater than 88%        -Will need ambulatory pulse ox prior to discharge        -Patient stated she has incentive spirometry at home will continue to use likely lower lobe atelectasis    2.  Acute B/L segmental and subsegmental submassive provoked PE w/ RV dialation       -,  Tro- 17, 16       -Echo-RV strain       -Likely secondary to recent knee surgery       -Transitioned to Eliquis       -Will need minimum 3 to 6 months of therapy       -Will need repeat echo in 3 months    3.  Acute DVT        -RLE DVT        -Continue anticoagulation as above    4. H/O right total knee arthroplasty       -Patient having significantly increased right lower extremity edema       -Educated to use Ace wrap's to decrease swelling       -Ortho following      -Outpatient follow-up per discharge instructions  -Pulmonary will sign off    Chief Complaint:    \"I want to go home\"    Subjective:    Yumi was comfortably sitting in her bed.  She reports that she would like to be discharged today.  Patient does report significant increase in right lower extremity edema.  Currently denying any fevers, chills, night sweats, pleuritic chest pain, or palpitations.    Objective:    Vitals: Blood pressure 134/81, pulse 85, temperature 98.1 °F (36.7 °C), resp. rate 18, height 5' 6\" (1.676 m), weight 81.5 kg (179 lb 10.8 oz), SpO2 (!) 89%.,Body mass index is 29 kg/m².      Intake/Output Summary (Last 24 hours) at 3/21/2024 0910  Last data filed at 3/20/2024 1330  Gross per 24 hour   Intake 66 ml   Output --   Net 66 ml       Invasive Devices       Peripheral Intravenous Line  Duration             Peripheral IV 03/18/24 Dorsal (posterior);Right Hand " "2 days    Peripheral IV 03/18/24 Right Antecubital 2 days                    Physical Exam:     Physical Exam  Constitutional:       General: She is not in acute distress.     Appearance: Normal appearance. She is normal weight. She is not ill-appearing.   HENT:      Head: Normocephalic and atraumatic.      Nose: Nose normal. No congestion or rhinorrhea.      Mouth/Throat:      Mouth: Mucous membranes are dry.      Pharynx: Oropharynx is clear. No oropharyngeal exudate or posterior oropharyngeal erythema.   Cardiovascular:      Rate and Rhythm: Normal rate and regular rhythm.      Pulses: Normal pulses.      Heart sounds: Normal heart sounds. No murmur heard.     No friction rub. No gallop.   Pulmonary:      Effort: Pulmonary effort is normal. No respiratory distress.      Breath sounds: No stridor. No wheezing, rhonchi or rales.      Comments: Clear breath sounds  Chest:      Chest wall: No tenderness.   Abdominal:      General: Abdomen is flat. Bowel sounds are normal. There is no distension.      Palpations: Abdomen is soft. There is no mass.   Musculoskeletal:         General: No swelling or tenderness. Normal range of motion.      Cervical back: Normal range of motion. No rigidity or tenderness.   Skin:     General: Skin is warm and dry.      Coloration: Skin is not jaundiced or pale.   Neurological:      General: No focal deficit present.      Mental Status: She is alert and oriented to person, place, and time. Mental status is at baseline.   Psychiatric:         Mood and Affect: Mood normal.         Behavior: Behavior normal.         Labs: I have personally reviewed pertinent lab results., ABG: No results found for: \"PHART\", \"ZWU8UXV\", \"PO2ART\", \"UMO0SJD\", \"F6KVKGTT\", \"BEART\", \"SOURCE\", BNP: No results found for: \"BNP\", CBC: No results found for: \"WBC\", \"HGB\", \"HCT\", \"MCV\", \"PLT\", \"ADJUSTEDWBC\", \"RBC\", \"MCH\", \"MCHC\", \"RDW\", \"MPV\", \"NRBC\", CMP: No results found for: \"SODIUM\", \"K\", \"CL\", \"CO2\", \"ANIONGAP\", " "\"BUN\", \"CREATININE\", \"GLUCOSE\", \"CALCIUM\", \"AST\", \"ALT\", \"ALKPHOS\", \"PROT\", \"BILITOT\", \"EGFR\", PT/INR: No results found for: \"PT\", \"INR\"      Imaging and other studies: I have personally reviewed pertinent films in PACS    CTA-sensitive bilateral PEs extending subsegmental  "

## 2024-03-21 NOTE — CASE MANAGEMENT
Case Management Discharge Planning Note    Patient name Stormy Hermosillo  Location S /S -01 MRN [id number]  : 1945 Date 3/21/2024       Current Admission Date: 3/18/2024  Current Admission Diagnosis:Pulmonary embolism, bilateral (HCC)   Patient Active Problem List    Diagnosis Date Noted    Atrial flutter, paroxysmal (HCC) 2024    History of total knee arthroplasty, right 2024    Pulmonary embolism, bilateral (HCC) 2024    GERD (gastroesophageal reflux disease) 2024    Hyperlipidemia 2024    Acute deep vein thrombosis (DVT) of distal vein of right lower extremity (HCC) 2024    Unilateral primary osteoarthritis, right knee 2024    Right hand pain 2023    Primary osteoarthritis of right hand 2023    Localized primary osteoarthritis of carpometacarpal (CMC) joint of right wrist 2023    Symptomatic varicose veins of both lower extremities 2018    Venous insufficiency of both lower extremities 2018      LOS (days): 3  Geometric Mean LOS (GMLOS) (days): 4  Days to GMLOS:1     OBJECTIVE:  Risk of Unplanned Readmission Score: 10.62         Current admission status: Inpatient   Preferred Pharmacy:   [id number] - [place] - [address]  [address]  [place] [address]  Phone: [phone] Fax: [phone]    Primary Care Provider: Mateo Mahoney DO    Primary Insurance: "[employer] [facility]  Secondary Insurance:     DISCHARGE DETAILS:    Discharge planning discussed with:: Patient  Freedom of Choice: Yes  Comments - Freedom of Choice: No CM DC needs  CM contacted family/caregiver?: No- see comments  Were Treatment Team discharge recommendations reviewed with patient/caregiver?: Yes  Did patient/caregiver verbalize understanding of patient care needs?: Yes  Were patient/caregiver advised of the risks associated with not following Treatment Team discharge recommendations?: Yes    Contacts  Patient Contacts:  Patient  Relationship to Patient:: Other (Comment)  Contact Method: In Person  Reason/Outcome: Discharge Planning, Continuity of Care    Requested Home Health Care         Is the patient interested in HHC at discharge?: No    DME Referral Provided  Referral made for DME?: No    Other Referral/Resources/Interventions Provided:  Interventions: None Indicated         Treatment Team Recommendation: Home  Discharge Destination Plan:: Home  Transport at Discharge : Family                             IMM Given (Date):: 03/21/24  IMM Given to:: Patient        IMM reviewed with patient.  Patient agrees with discharge determination.       CM notified that patient was medically stable for DC home today.  No CM DC needs at DC.    CM encouraged patient to follow up with all recommended appointments after discharge. Patient advised of importance for patient and family to participate in managing patient's medical well being.

## 2024-03-21 NOTE — DISCHARGE SUMMARY
[facility]  Discharge- Stormy Hermosillo 1945, 78 y.o. female MRN: [id number]  Unit/Bed#: S -01 Encounter: [id number]  Primary Care Provider: Mateo Mahoney DO   Date and time admitted to hospital: 3/18/2024 12:26 PM    * Pulmonary embolism, bilateral (HCC)  Assessment & Plan  POA with worsening shortness of breath s/p right total knee arthroplasty on 3/12/2024. Reports taking aspirin 81 mg, twice daily since her procedure.  Venous duplex notable for evidence of acute nonocclusive DVT in the posterior tibial and peroneal veins.  Well-defined hypoechoic nonvascular cystic type structure, 5.6 cm noted in the popliteal fossa.  3/18: CTA PE: Extensive bilateral pulmonary emboli involving the main arteries and lower branches with some extending distally to the subsegmental level.  No discrete areas of parenchymal infarction. Calculated RV/LV ratio 1.14.  3/18: Venous duplex notable for evidence of acute nonocclusive DVT in the R posterior tibial and peroneal veins. Well-defined hypoechoic nonvascular cystic type structure, 5.6 cm noted in the popliteal fossa.  3/18: Echo EF 65%, G1 DD. Right ventricle is not well-visualized, cavity size is dilated. Normal TAPSE. RVSP 52 mmHg.  Etiology of PE, provoked in the setting of recent surgery.     Plan:  Continue Eliquis 10 mg twice daily for 1 week, then transition to 5 mg twice daily    Atrial flutter, paroxysmal (HCC)  Assessment & Plan  [id number] score 5  Intermittent episodes of atrial flutter noted on telemetry.  Eliquis daily  Follow up with cardiology as an outpatient       Acute deep vein thrombosis (DVT) of distal vein of right lower extremity (HCC)  Assessment & Plan  3/18: Venous duplex notable for evidence of acute nonocclusive DVT in the R posterior tibial and peroneal veins. Well-defined hypoechoic nonvascular cystic type structure, 5.6 cm noted in the popliteal fossa.  Edema and erythema in RLE, tender to touch  Continue  "Eliquis    Hyperlipidemia  Assessment & Plan  Resume home PTA Lipitor 10 mg.    GERD (gastroesophageal reflux disease)  Assessment & Plan  Resume home omeprazole    History of total knee arthroplasty, right  Assessment & Plan  S/p right total knee arthroplasty on 3/12/2024.   No surgical intervention, will follow outpatient with ortho      Medical Problems       Resolved Problems  Date Reviewed: 3/21/2024   None       Discharging Physician / Practitioner: Isaura Rutledge MD  PCP: Mateo Mahoney DO  Admission Date:   Admission Orders (From admission, onward)       Ordered        03/18/24 1506  INPATIENT ADMISSION  Once                          Discharge Date: 03/21/24    Consultations During Hospital Stay:  Ortho  Critical care     Procedures Performed:   none    Significant Findings / Test Results:   VAS lower limb venous duplex study, unilateral/limited   Final Result by Valdo Henley DO (03/18 4757)      CTA ED chest PE Study   Final Result by Mateo Carroll MD (03/18 3651)      1.  Extensive bilateral pulmonary emboli involving the main arteries and lobar branches with some extending distally to the subsegmental level. No discrete areas of parenchymal infarction.         2.  The calculated ratio of right ventricular to left ventricular diameter (RV/LV ratio) is 1.14.      There is a critical finding of acute PE with RV/LV ratio >0.9. Based on PERT algorithm recommendations:    \"  Order STAT biomarkers including troponin, NT-BNP or BNP    \"  Calculate PESI score:   o  If PESI score falls in I-III, with negative biomarkers, please order a PERT priority ECHO.   o  If PESI score falls in IV-V or with positive biomarkers, please order STAT ECHO and alert the [place] PERT via PAC at [phone].            I personally discussed this study with RADHA BRANCH on 3/18/2024 2:46 PM.            Workstation performed: [id number]         XR chest 1 view portable   ED Interpretation by Everardo Echols DO (03/18 6998) " "  NAD      Final Result by Mateo Carroll MD (03/18 8603)      1.  Extensive bilateral pulmonary emboli involving the main arteries and lobar branches with some extending distally to the subsegmental level. No discrete areas of parenchymal infarction.         2.  The calculated ratio of right ventricular to left ventricular diameter (RV/LV ratio) is 1.14.      There is a critical finding of acute PE with RV/LV ratio >0.9. Based on PERT algorithm recommendations:    \"  Order STAT biomarkers including troponin, NT-BNP or BNP    \"  Calculate PESI score:   o  If PESI score falls in I-III, with negative biomarkers, please order a PERT priority ECHO.   o  If PESI score falls in IV-V or with positive biomarkers, please order STAT ECHO and alert the [place] PERT via PAC at [phone].            I personally discussed this study with RADHA BRANCH on 3/18/2024 2:46 PM.            Workstation performed: [id number]               Incidental Findings:   none     Test Results Pending at Discharge (will require follow up):   none     Outpatient Tests Requested:  none    Complications:  none    Reason for Admission: PE and DVT    Hospital Course:   Stormy Hermosillo is a 78 y.o. female patient who originally presented to the hospital on 3/18/2024 due to worsening shortness of breath after recent right total knee arthroplasty on 3/12/2024.  Patient had a CT imaging that was notable for a bilateral PE and a venous duplex that was notable for a right lower extremity DVT.  Due to high acuity of these clots, patient was sent to the ICU for closer monitoring and where she was started on a heparin drip.  Patient had an echo that was significant for an EF of 65%, and it was noted that her right ventricle was slightly dilated.  Concern for cor pulmonale.  Patient remained stable throughout the course of her ICU stay and was able to be transitioned to oral Eliquis.  She was transitioned to medicine floors on 3/20 where she continued " "her recommended therapy of Eliquis as well as her home medications.  Vitals remained stable as well as labs, and patient reported symptomatic improvement of shortness of breath.  She denied any chest pains, abdominal pains, nausea vomiting or diarrhea.  Right lower extremity still red and swollen as well as tender to the touch, however per patient has improved.  Will continue Eliquis as an outpatient, with instructions for patient to follow-up with cardiology due to recent diagnosis of atrial flutter.      Please see above list of diagnoses and related plan for additional information.     Condition at Discharge: stable    Discharge Day Visit / Exam:   Subjective:  see today's progress note  Vitals: Blood Pressure: 134/81 (03/21/24 0706)  Pulse: 85 (03/21/24 0706)  Temperature: 98.1 °F (36.7 °C) (03/21/24 0706)  Temp Source: Oral (03/20/24 2300)  Respirations: 18 (03/21/24 0706)  Height: 5' 6\" (167.6 cm) (03/18/24 2000)  Weight - Scale: 81.5 kg (179 lb 10.8 oz) (03/21/24 0600)  SpO2: (!) 89 % (03/21/24 0706)  Exam:   Physical Exam see today's progress note    Discussion with Family:  Patient in contact with her .     Discharge instructions/Information to patient and family:   See after visit summary for information provided to patient and family.      Provisions for Follow-Up Care:  See after visit summary for information related to follow-up care and any pertinent home health orders.      Mobility at time of Discharge:   Basic Mobility Inpatient Raw Score: 18  JH-HLM Goal: 6: Walk 10 steps or more  JH-HLM Achieved: 8: Walk 250 feet ot more  HLM Goal achieved. Continue to encourage appropriate mobility.     Disposition:   Home    Planned Readmission: no     Discharge Statement:  I spent 45 minutes discharging the patient. This time was spent on the day of discharge. I had direct contact with the patient on the day of discharge. Greater than 50% of the total time was spent examining patient, answering all " patient questions, arranging and discussing plan of care with patient as well as directly providing post-discharge instructions.  Additional time then spent on discharge activities.    Discharge Medications:  See after visit summary for reconciled discharge medications provided to patient and/or family.      **Please Note: This note may have been constructed using a voice recognition system**

## 2024-03-21 NOTE — PROGRESS NOTES
"Progress Note - Orthopedics   Stormy Nolascoried 78 y.o. female MRN: [id number]  Unit/Bed#: S -01      Subjective:    78 y.o.female seen and examined at bedside. She continues to complain of right lower extremity swelling and discomfort. No discomfort at her surgical site, notes pain over the lateral and posterior calf with associated swelling.     Labs:  0   Lab Value Date/Time    HCT 33.1 (L) 03/20/2024 0503    HCT 33.5 (L) 03/19/2024 0430    HCT 36.1 03/18/2024 1047    HGB 10.5 (L) 03/20/2024 0503    HGB 10.6 (L) 03/19/2024 0430    HGB 11.4 (L) 03/18/2024 1047    INR 0.97 03/18/2024 1047    WBC 10.89 (H) 03/20/2024 0503    WBC 9.68 03/19/2024 0430    WBC 9.21 03/18/2024 1047    ESR 7 03/14/2018 0822       Meds:    Current Facility-Administered Medications:     acetaminophen (TYLENOL) tablet 975 mg, 975 mg, Oral, Q6H PRN, Basanta Stephanie, DO, 975 mg at 03/21/24 0643    apixaban (ELIQUIS) tablet 10 mg, 10 mg, Oral, BID, 10 mg at 03/21/24 0931 **FOLLOWED BY** [START ON 3/27/2024] apixaban (ELIQUIS) tablet 5 mg, 5 mg, Oral, BID, Basanta Stephanie, DO    chlorhexidine (PERIDEX) 0.12 % oral rinse 15 mL, 15 mL, Mouth/Throat, Q12H SUSY, Basanta Stephanie, DO, 15 mL at 03/21/24 0931    HYDROmorphone HCl (DILAUDID) injection 0.2 mg, 0.2 mg, Intravenous, Q2H PRN, Basanta Stephanie, DO    lidocaine (LIDODERM) 5 % patch 1 patch, 1 patch, Topical, Daily, Basanta Stephanie, DO, 1 patch at 03/19/24 0807    metoprolol succinate (TOPROL-XL) 24 hr tablet 25 mg, 25 mg, Oral, Daily, Isaura Rutledge MD, 25 mg at 03/21/24 1146    oxyCODONE (ROXICODONE) split tablet 2.5 mg, 2.5 mg, Oral, Q4H PRN, 2.5 mg at 03/19/24 0805 **OR** oxyCODONE (ROXICODONE) IR tablet 5 mg, 5 mg, Oral, Q4H PRN, Basanta Stephanie, DO    pantoprazole (PROTONIX) EC tablet 40 mg, 40 mg, Oral, Early Morning, Landryanta Stephanie, DO, 40 mg at 03/21/24 0636    Blood Culture:   No results found for: \"BLOODCX\"    Wound Culture:   No results found for: \"WOUNDCULT\"    Ins and Outs:  I/O last 24 " hours:  In: 864.9 [P.O.:775; I.V.:89.9]  Out: -           Physical:  Vitals:    03/21/24 1124   BP: 136/72   Pulse: 81   Resp:    Temp:    SpO2: 95%     Musculoskeletal: right Lower Extremity  Please see wound images in chart for full characterization.   She has an anterior incision over her right knee that is well appearing without wound dehiscence or purulence noted.   She has no pain to palpation about the knee.   She has significant diffuse swelling, erythema and warmth over the lower leg, with edema extending into the foot and ankle. Diffusely tender about the calf.   Sensation intact to saphenous, sural, tibial, superficial peroneal nerve, and deep peroneal  Motor intact to +FHL/EHL, +ankle dorsi/plantar flexion  2+ DP pulse  Digits warm and well perfused  Capillary refill < 2 seconds    Assessment:    78 y.o.female s/p recent right total knee arthroplasty done at outside facility roughly 9 days ago, currently admitted with multiple embolic events including bilateral PE and right lower extremity posterior tibial/peroneal vein DVTs.     Plan:  WBAT to the right lower extremity,   No immediate orthopedic intervention indicated.   Management of DVT/PE per primary team.   Medical management per primary team.   Dispo: Ortho signing off  Patient to follow up with primary orthopedic surgeon of record upon discharge.     Maribel Clancy PA-C

## 2024-03-21 NOTE — ASSESSMENT & PLAN NOTE
3/18: Venous duplex notable for evidence of acute nonocclusive DVT in the R posterior tibial and peroneal veins. Well-defined hypoechoic nonvascular cystic type structure, 5.6 cm noted in the popliteal fossa.  Edema and erythema in RLE, tender to touch  Continue Eliquis

## 2024-03-21 NOTE — PROGRESS NOTES
[facility]  Progress Note  Name: Stormy Hermosillo I  MRN: [id number]  Unit/Bed#: S -01 I Date of Admission: 3/18/2024   Date of Service: 3/21/2024 I Hospital Day: 3    Assessment/Plan   * Pulmonary embolism, bilateral (HCC)  Assessment & Plan  POA with worsening shortness of breath s/p right total knee arthroplasty on 3/12/2024. Reports taking aspirin 81 mg, twice daily since her procedure.  Venous duplex notable for evidence of acute nonocclusive DVT in the posterior tibial and peroneal veins.  Well-defined hypoechoic nonvascular cystic type structure, 5.6 cm noted in the popliteal fossa.  3/18: CTA PE: Extensive bilateral pulmonary emboli involving the main arteries and lower branches with some extending distally to the subsegmental level.  No discrete areas of parenchymal infarction. Calculated RV/LV ratio 1.14.  3/18: Venous duplex notable for evidence of acute nonocclusive DVT in the R posterior tibial and peroneal veins. Well-defined hypoechoic nonvascular cystic type structure, 5.6 cm noted in the popliteal fossa.  3/18: Echo EF 65%, G1 DD. Right ventricle is not well-visualized, cavity size is dilated. Normal TAPSE. RVSP 52 mmHg.  Etiology of PE, provoked in the setting of recent surgery.     Plan:  PESI score: Intermediate risk.  Eliquis loading dose  Monitor hemodynamics closely -should any change occur reengage PERT team for possible thrombectomy.    Atrial flutter, paroxysmal (HCC)  Assessment & Plan  [id number] score 5  Intermittent episodes of atrial flutter noted on telemetry.  Eliquis      Acute deep vein thrombosis (DVT) of distal vein of right lower extremity (HCC)  Assessment & Plan  3/18: Venous duplex notable for evidence of acute nonocclusive DVT in the R posterior tibial and peroneal veins. Well-defined hypoechoic nonvascular cystic type structure, 5.6 cm noted in the popliteal fossa.  Edema and erythema in RLE, tender to touch  Continue  Eliquis    Hyperlipidemia  Assessment & Plan  Resume home PTA Lipitor 10 mg.    GERD (gastroesophageal reflux disease)  Assessment & Plan  Continue pantoprazole 40 mg daily in substitution for omeprazole.    History of total knee arthroplasty, right  Assessment & Plan  S/p right total knee arthroplasty on 3/12/2024.   No surgical intervention, will follow outpatient with ortho             VTE Pharmacologic Prophylaxis: VTE Score: 12 High Risk (Score >/= 5) - Pharmacological DVT Prophylaxis Ordered: apixaban (Eliquis). Sequential Compression Devices Ordered.    Mobility:   Basic Mobility Inpatient Raw Score: 18  JH-HLM Goal: 6: Walk 10 steps or more  JH-HLM Achieved: 6: Walk 10 steps or more  JH-HLM Goal achieved. Continue to encourage appropriate mobility.    Patient Centered Rounds: I performed bedside rounds with nursing staff today.   Discussions with Specialists or Other Care Team Provider: ortho    Education and Discussions with Family / Patient:  will update .     Total Time Spent on Date of Encounter in care of patient: 25 mins. This time was spent on one or more of the following: performing physical exam; counseling and coordination of care; obtaining or reviewing history; documenting in the medical record; reviewing/ordering tests, medications or procedures; communicating with other healthcare professionals and discussing with patient's family/caregivers.    Current Length of Stay: 3 day(s)  Current Patient Status: Inpatient   Certification Statement: The patient will continue to require additional inpatient hospital stay due to PE  Discharge Plan: Anticipate discharge in 24-48 hrs to home.    Code Status: Level 1 - Full Code    Subjective:   Patient seen and examined at bedside this morning.  No acute events overnight.  States that she still having pain and tenderness in the right lower extremity.  Denies any shortness of breath, nausea, vomiting, or diarrhea.    Objective:     Vitals:   Temp  (24hrs), Av.3 °F (36.8 °C), Min:97.7 °F (36.5 °C), Max:98.8 °F (37.1 °C)    Temp:  [97.7 °F (36.5 °C)-98.8 °F (37.1 °C)] 98.1 °F (36.7 °C)  HR:  [84-95] 85  Resp:  [18-34] 18  BP: (125-157)/(57-81) 134/81  SpO2:  [87 %-95 %] 89 %  Body mass index is 29 kg/m².     Input and Output Summary (last 24 hours):     Intake/Output Summary (Last 24 hours) at 3/21/2024 0832  Last data filed at 3/20/2024 1330  Gross per 24 hour   Intake 66 ml   Output --   Net 66 ml       Physical Exam:   Physical Exam  Constitutional:       General: She is not in acute distress.     Appearance: Normal appearance.   HENT:      Head: Normocephalic and atraumatic.      Right Ear: External ear normal.      Left Ear: External ear normal.      Nose: Nose normal.      Mouth/Throat:      Pharynx: Oropharynx is clear.   Eyes:      Conjunctiva/sclera: Conjunctivae normal.   Cardiovascular:      Rate and Rhythm: Normal rate and regular rhythm.      Heart sounds: No murmur heard.  Pulmonary:      Effort: Pulmonary effort is normal. No respiratory distress.      Breath sounds: Normal breath sounds.   Abdominal:      General: There is no distension.      Palpations: Abdomen is soft.      Tenderness: There is no abdominal tenderness.   Musculoskeletal:         General: Swelling (RLE) and tenderness present.   Skin:     General: Skin is warm and dry.      Findings: Erythema (Extending from the right foot to the base of the right knee) present.      Comments: Well-healing surgical scar of the right knee   Neurological:      Mental Status: She is alert and oriented to person, place, and time.   Psychiatric:         Mood and Affect: Mood normal.          Additional Data:     Labs:  Results from last 7 days   Lab Units 24  0503 24  0430 24  1047   WBC Thousand/uL 10.89*   < > 9.21   HEMOGLOBIN g/dL 10.5*   < > 11.4*   HEMATOCRIT % 33.1*   < > 36.1   PLATELETS Thousands/uL 336   < > 327   NEUTROS PCT %  --   --  59   LYMPHS PCT %  --   --  25    MONOS PCT %  --   --  12   EOS PCT %  --   --  2    < > = values in this interval not displayed.     Results from last 7 days   Lab Units 03/20/24  0503 03/19/24  0430 03/18/24  1047   SODIUM mmol/L 138   < > 139   POTASSIUM mmol/L 4.4   < > 4.1   CHLORIDE mmol/L 107   < > 105   CO2 mmol/L 26   < > 25   BUN mg/dL 16   < > 18   CREATININE mg/dL 0.79   < > 0.71   ANION GAP mmol/L 5   < > 9   CALCIUM mg/dL 8.1*   < > 8.8   ALBUMIN g/dL  --   --  3.7   TOTAL BILIRUBIN mg/dL  --   --  0.91   ALK PHOS U/L  --   --  79   ALT U/L  --   --  27   AST U/L  --   --  23   GLUCOSE RANDOM mg/dL 92   < > 105    < > = values in this interval not displayed.     Results from last 7 days   Lab Units 03/18/24  1047   INR  0.97                   Lines/Drains:  Invasive Devices       Peripheral Intravenous Line  Duration             Peripheral IV 03/18/24 Dorsal (posterior);Right Hand 2 days    Peripheral IV 03/18/24 Right Antecubital 2 days                          Imaging: Reviewed radiology reports from this admission including: chest CT scan and ultrasound(s)    Recent Cultures (last 7 days):         Last 24 Hours Medication List:   Current Facility-Administered Medications   Medication Dose Route Frequency Provider Last Rate    acetaminophen  975 mg Oral Q6H PRN Basanta Stephanie, DO      apixaban  10 mg Oral BID Basanta Stephanie, DO      Followed by    [START ON 3/27/2024] apixaban  5 mg Oral BID Basanta Stephanie, DO      chlorhexidine  15 mL Mouth/Throat Q12H [facility] Basanta Stephanie, DO      HYDROmorphone  0.2 mg Intravenous Q2H PRN Basanta Stephanie, DO      lidocaine  1 patch Topical Daily Basanta Stephanie, DO      oxyCODONE  2.5 mg Oral Q4H PRN Basanta Stephanie, DO      Or    oxyCODONE  5 mg Oral Q4H PRN Basanta Stephanie, DO      pantoprazole  40 mg Oral Early Morning Basanta Stephanie, DO          Today, Patient Was Seen By: Isaura Rutledge MD    **Please Note: This note may have been constructed using a voice recognition system.**

## 2024-03-25 ENCOUNTER — OFFICE VISIT (OUTPATIENT)
Dept: PULMONOLOGY | Facility: CLINIC | Age: 79
End: 2024-03-25
Payer: COMMERCIAL

## 2024-03-25 VITALS
HEIGHT: 66 IN | DIASTOLIC BLOOD PRESSURE: 90 MMHG | TEMPERATURE: 98.9 F | OXYGEN SATURATION: 98 % | BODY MASS INDEX: 28.87 KG/M2 | WEIGHT: 179.6 LBS | SYSTOLIC BLOOD PRESSURE: 148 MMHG | HEART RATE: 74 BPM

## 2024-03-25 DIAGNOSIS — I82.4Z1 ACUTE DEEP VEIN THROMBOSIS (DVT) OF DISTAL VEIN OF RIGHT LOWER EXTREMITY (HCC): ICD-10-CM

## 2024-03-25 DIAGNOSIS — I26.99 PULMONARY EMBOLISM, BILATERAL (HCC): Primary | ICD-10-CM

## 2024-03-25 PROCEDURE — 99214 OFFICE O/P EST MOD 30 MIN: CPT | Performed by: INTERNAL MEDICINE

## 2024-03-25 PROCEDURE — G2211 COMPLEX E/M VISIT ADD ON: HCPCS | Performed by: INTERNAL MEDICINE

## 2024-03-25 RX ORDER — FEXOFENADINE HCL 180 MG/1
180 TABLET ORAL
COMMUNITY

## 2024-03-25 RX ORDER — MOMETASONE FUROATE 50 UG/1
2 SPRAY, METERED NASAL DAILY
COMMUNITY
Start: 2024-01-24

## 2024-03-25 RX ORDER — GABAPENTIN 100 MG/1
CAPSULE ORAL
COMMUNITY
Start: 2024-01-24

## 2024-03-25 RX ORDER — METHOCARBAMOL 500 MG/1
500 TABLET, FILM COATED ORAL 4 TIMES DAILY PRN
COMMUNITY
Start: 2024-03-11

## 2024-03-25 RX ORDER — ACETAMINOPHEN AND CODEINE PHOSPHATE 300; 30 MG/1; MG/1
TABLET ORAL
COMMUNITY
Start: 2024-03-11

## 2024-03-25 RX ORDER — CELECOXIB 200 MG/1
200 CAPSULE ORAL DAILY
COMMUNITY
Start: 2024-03-13 | End: 2024-04-12

## 2024-03-25 RX ORDER — AMLODIPINE BESYLATE 5 MG/1
5 TABLET ORAL DAILY
COMMUNITY
Start: 2024-02-05

## 2024-03-25 RX ORDER — OXYCODONE HYDROCHLORIDE 5 MG/1
TABLET ORAL
COMMUNITY
Start: 2024-03-11

## 2024-03-25 RX ORDER — GUAIFENESIN 600 MG/1
600 TABLET, EXTENDED RELEASE ORAL
COMMUNITY

## 2024-03-25 NOTE — PROGRESS NOTES
Consultation - Pulmonary Medicine   Stormy Hermosillo 78 y.o. female MRN: [id number]    Physician Requesting Consult: post hospital  Reason for Consult: PE    Stormy Hermosillo is a 78 y.o. female afib, gerd, OA, who presents for post hospital follow up for PE.    # PE, intermediate high risk  # DVT  Recently hospitlized for PE. Likely provoked in setting of knee surgery. TTE with mild RV dysfunction and imaging w b/l PE  Sob resolved, no bleeding, satting well on RA    - repeat TTE in 3 months  - continue AC (eliquis), refilled    Follow up in 4 months    Vaccines      Immunization History   Administered Date(s) Administered    COVID-19 PFIZER VACCINE 0.3 ML IM 03/08/2021, 03/29/2021, 12/08/2021    COVID-19 Pfizer vac (Iván-sucrose, gray cap) 12 yr+ IM 04/12/2022      _____________________________________    HPI:    Stormy Hermosillo is a 78 y.o. female hx afib, gerd, OA, who presents for post hospital follow up for PE.    Recetnlya dmitted for DVT and PE (intermediate high risk with RH strain) a week ago, provoked by recent knee surgery  Discharged on Eliquis    Since discharge feeling good, no shortness of breaht, no chest pain, dizziness or lightheadedness  Does have knee swelling, following with ortho  No bleeding episodes or bruising since left the hospital        Review of Systems:  Review of Systems  Aside from what is mentioned in the HPI, the review of systems otherwise negative.    Current Medications:    Current Outpatient Medications:     acetaminophen (TYLENOL) 500 mg tablet, Take 500 mg by mouth every 6 (six) hours, Disp: , Rfl:     acetaminophen-codeine (TYLENOL with CODEINE #3) 300-30 MG per tablet, take 1 tablet by mouth every 6 hours if needed for MILD TO MODERATE PAIN, Disp: , Rfl:     apixaban (Eliquis) 5 mg, Take 2 tablets (10 mg total) by mouth 2 (two) times a day for 7 days, THEN 1 tablet (5 mg total) 2 (two) times a day for 23 days., Disp: 74 tablet, Rfl: 0    aspirin 81 MG tablet,  Take 81 mg by mouth, Disp: , Rfl:     atorvastatin (LIPITOR) 10 mg tablet, , Disp: , Rfl: 0    fexofenadine (ALLEGRA) 180 MG tablet, Take 180 mg by mouth, Disp: , Rfl:     guaiFENesin (MUCINEX) 600 mg 12 hr tablet, Take 600 mg by mouth, Disp: , Rfl:     metoprolol succinate (TOPROL-XL) 25 mg 24 hr tablet, Take 1 tablet (25 mg total) by mouth daily, Disp: 30 tablet, Rfl: 0    mometasone (NASONEX) 50 mcg/act nasal spray, 2 sprays daily, Disp: , Rfl:     Multiple Vitamins-Minerals (MULTIVITAMIN ADULT PO), Take 1 tablet by mouth, Disp: , Rfl:     omeprazole (PriLOSEC) 20 mg delayed release capsule, Take 20 mg by mouth 2 (two) times a day, Disp: , Rfl:     amLODIPine (NORVASC) 5 mg tablet, Take 5 mg by mouth daily (Patient not taking: Reported on 3/25/2024), Disp: , Rfl:     celecoxib (CeleBREX) 200 mg capsule, Take 200 mg by mouth daily (Patient not taking: Reported on 3/25/2024), Disp: , Rfl:     Elastic Bandages & Supports (MEDICAL COMPRESSION STOCKINGS) MISC, by Does not apply route daily Knee High 20-30mmHg, Disp: 2 each, Rfl: 4    gabapentin (NEURONTIN) 100 mg capsule, take 1 capsule by mouth nightly (Patient not taking: Reported on 3/25/2024), Disp: , Rfl:     methocarbamol (ROBAXIN) 500 mg tablet, Take 500 mg by mouth 4 (four) times a day as needed, Disp: , Rfl:     mupirocin (BACTROBAN) 2 % ointment, apply A SMALL AMOUNT TO BOTH NOSTRILS twice a day for 5 days PRIOR TO SURGERY (Patient not taking: Reported on 3/25/2024), Disp: , Rfl:     oxyCODONE (ROXICODONE) 5 immediate release tablet, take 1 tablet by mouth every 6 hours if needed for MODERATE TO SEVERE PAIN (Patient not taking: Reported on 3/25/2024), Disp: , Rfl:     Current Facility-Administered Medications:     bupivacaine (MARCAINE) 0.25 % injection 0.5 mL, 0.5 mL, Injection, , Bharath Uribe DPM, 0.5 mL at 12/14/22 9745    Historical Information   History reviewed. No pertinent past medical history.  Past Surgical History:   Procedure Laterality Date  "   BACK SURGERY      BREAST SURGERY      FOOT SURGERY      HIP SURGERY Left     KNEE SURGERY Left      Social History   Social History     Tobacco Use   Smoking Status Never   Smokeless Tobacco Never       Family History:   Family History   Problem Relation Age of Onset    Heart disease Mother     Cancer Mother     Varicose Veins Mother     Heart disease Father     Heart disease Sister     Cancer Sister     Cancer Brother     Varicose Veins Maternal Grandmother          PhysicalExamination:  Vitals:   /90 (BP Location: Left arm, Patient Position: Sitting, Cuff Size: Standard)   Pulse 74   Temp 98.9 °F (37.2 °C) (Tympanic)   Ht 5' 6\" (1.676 m)   Wt 81.5 kg (179 lb 9.6 oz)   SpO2 98%   BMI 28.99 kg/m²     Appearance -- NAD, speaking full sentences  HEENT -- anicteric sclera, clear OP, MMM  Neck -- no JVD  Heart -- RRR, no murmurs  Lungs -- CTAB  Abdomen -- soft, NTND, +bs  Extremities -- WWP, no LE edema  Skin -- no rash  Neuro -- A&Ox3, wnl  Psych -- no obvious depression or hallucination        Diagnostic Data:  Labs:  I personally reviewed the most recent laboratory data pertinent to today's visit    Lab Results   Component Value Date    WBC 10.89 (H) 03/20/2024    HGB 10.5 (L) 03/20/2024    HCT 33.1 (L) 03/20/2024    MCV 93 03/20/2024     03/20/2024     Lab Results   Component Value Date    CALCIUM 8.1 (L) 03/20/2024    K 4.4 03/20/2024    CO2 26 03/20/2024     03/20/2024    BUN 16 03/20/2024    CREATININE 0.79 03/20/2024     No results found for: \"IGE\"  Lab Results   Component Value Date    ALT 27 03/18/2024    AST 23 03/18/2024    ALKPHOS 79 03/18/2024       PFT results:  The most recent pulmonary function tests were reviewed.  None    Imaging:  I personally reviewed the images on the PAC system pertinent to today's visit  CTA Chest 3/18/24:  Extensive bilateral pulmonary emboli involving the main arteries and lobar branches with some extending distally to the subsegmental level. No " discrete areas of parenchymal infarction.  2.  The calculated ratio of right ventricular to left ventricular diameter (RV/LV ratio) is 1.14.    Other studies:  TTE 3/18/24: Left ventricular cavity size is normal. Wall thickness is mildly increased. The left ventricular ejection fraction is 65%. Systolic function is normal. Diastolic function is mildly abnormal, consistent with grade I (abnormal) relaxation.    Right Ventricle: Right ventricle is not well visualized. Right ventricular cavity size is dilated. Visually estimated systolic function is mildly reduced.  There is free wall hypokinesis.  However there is normal tricuspid annular plane systolic excursion (TAPSE) > 1.7 cm.    Aortic Valve: There is mild regurgitation. There is aortic valve sclerosis.    Tricuspid Valve: There is moderate regurgitation. The right ventricular systolic pressure is mildly to moderately elevated. The estimated right ventricular systolic pressure is 52.00 mmHg.    Pulmonic Valve: There is mild regurgitation.    Pericardium: There is no pericardial effusion.        Teresita Gil MD  [facility]

## 2024-03-27 ENCOUNTER — OFFICE VISIT (OUTPATIENT)
Dept: PHYSICAL THERAPY | Facility: CLINIC | Age: 79
End: 2024-03-27
Payer: COMMERCIAL

## 2024-03-27 VITALS — DIASTOLIC BLOOD PRESSURE: 71 MMHG | HEART RATE: 72 BPM | SYSTOLIC BLOOD PRESSURE: 113 MMHG

## 2024-03-27 DIAGNOSIS — M17.11 UNILATERAL PRIMARY OSTEOARTHRITIS, RIGHT KNEE: ICD-10-CM

## 2024-03-27 DIAGNOSIS — Z96.651 STATUS POST TOTAL RIGHT KNEE REPLACEMENT: Primary | ICD-10-CM

## 2024-03-27 DIAGNOSIS — M25.561 CHRONIC PAIN OF RIGHT KNEE: ICD-10-CM

## 2024-03-27 DIAGNOSIS — G89.29 CHRONIC PAIN OF RIGHT KNEE: ICD-10-CM

## 2024-03-27 PROCEDURE — 97140 MANUAL THERAPY 1/> REGIONS: CPT | Performed by: PHYSICAL THERAPIST

## 2024-03-27 PROCEDURE — 97110 THERAPEUTIC EXERCISES: CPT | Performed by: PHYSICAL THERAPIST

## 2024-03-27 PROCEDURE — 97112 NEUROMUSCULAR REEDUCATION: CPT | Performed by: PHYSICAL THERAPIST

## 2024-03-27 NOTE — PROGRESS NOTES
Progress Note     Today's date: 3/27/2024  Patient name: Stormy Hermosillo  : 1945  MRN: [id number]  Referring provider: Rodolfo Govea MD  Dx:   Encounter Diagnosis     ICD-10-CM    1. Status post total right knee replacement  Z96.651       2. Unilateral primary osteoarthritis, right knee  M17.11       3. Chronic pain of right knee  M25.561     G89.29           Start Time: 1035  Stop Time: 1115  Total time in clinic (min): 40 minutes    Subjective: The patient presents for the first follow-up appointment, was previously hospitalized for a DVT/PE concern. Currently the patient reports that post-op symptoms improved and that she was compliant some of the time with the initial HEP        Objective: See treatment diary below    Objective     Observations     Additional Observation Details  The surgical site was examined, appropriate closure of the wound and scar formation. No abnormal drainage, effusion, discoloration, or odor was found. Evidence of 2-3 blisters along the line of the bandage that have since burst and are currently covered in scab.    [facility] DVT CRITERIA:  DVT Risk  (+1) Active cancer within last 6 months negative  (+1) Paralysis, paresis or recent plaster immobilization of legs  negative  (+1) Recent Bedridden for >3 days or major surgery within last 12 weeks  positive  (+1) Localized tenderness along deep venous system  positive  (+1) Entire leg swelling  negative  (+1) Calf swelling > 3cm compared with asymptomatic leg (10cm below tibial tuberosity)  negative  (+1) Pitting Edema  negative  (+1) Collateral supervicial veins (non-varicose)  negative  (+1) Previously documented DVT  positive    (-2)  Alternative diagnosis more likely (muscle tear, cellulitis, etc)  positive    Score: 0  DVT considered likely in patients with score of 2 or more, unlikely if less than 2    [place] PE CRITERIA:  PE Risk  (+3) Clinical symptoms of DVT negative  (+3) Other diagnosis less likely than pulmonary embolism   negative  (+1.5) Heart rate >100  positive  (+1.5) Immobilization (=3 days) or surgery in the previous four weeks  positive  (+1.5) Previous DVT/PE  positive  (+1) Hemoptysis  negative  (+1) Malignancy  negative    Score: 3  PE is considered likely in patients with score of 4 or more, unlikely if less than 4              Passive Range of Motion     Right Knee   Flexion: 100 degrees   Extension: -3 degrees     Swelling     Left Knee Girth Measurement (cm)   10 cm above joint line: 46 cm  10 cm below joint line: 39 cm    Right Knee Girth Measurement (cm)   10 cm above joint line: 54 cm  10 cm below joint line: 40.5 cm        Assessment: The patient tolerated manual and active treatment well today. The PT reviewed IHEP and introduced initial manual and ther-ex program during today's treatment. The patient demonstrated good response to today's treatment.        Goals  Short Term Goals: to be achieved by 4 weeks  1) Patient to be independent with basic HEP  2) Decrease pain to 4/10 at its worst  3) Increase knee ROM by 5-10 degrees in all affected planes  4) Increase LE strength by 1/2 MMT grade in all affected planes    Long Term Goals: to be achieved by discharge  1) FOTO equal to or greater than projected goal.  2) Ambulation to improve to maximal level of function  3) Stair negotiation will improve to reciprocal  4) Sit to stand transfers will improve to maximal level of function      Plan  Planned therapy interventions: manual therapy, neuromuscular re-education, patient education, therapeutic exercise, therapeutic activities, IADL retraining and home exercise program  Frequency: 2x week  Duration in weeks: 8  Treatment plan discussed with: patient       Precautions: standard precautions      Diagnosis S/p TKA on 3/12/24   Last Re-Evaluation    Date of Service 2/14 (pre-op) 2/21 3/15  (post-op) 3/27          Visit Count 1 2 3 4 5 6 7 8 9 10 11   Manuals              Manual stretching all planes    SRB         "  Patellar mobs all planes    SRB          Tibiofemoral mobs all planes    SRB                        Neuro Re-Ed              Quad set HEP 3 sec x15, HEP  3 sec x15          QS + SLR HEP             Glute set bridge HEP                                                                     Ther Ex              Active warmup for flexibility and edema control    NuStep x9 min           LAQ HEP 3x10, 5\" hold Review            Heel slides   HEP 1x10 w/ strap          Squats HEP 3x10 Review            Knee flex/ext              Leg press              Hamstring stretch HEP 3x30\" Modified w/ strap, 10 sec x5, HEP                                        Ther Activity              Stair training                                                                                    Gait Training              Assistive device training              Level surface ambulation                                          Active Range of Motion              Right knee ext-flex              Passive Range of Motion              Right knee ext-flex    -3 - 100                        Assessment IE  IE, FOTO Wound check, DVT screen          Education HEP, pre op physical therapy compared to post op, objective findings Total joint home prep checklist provided and reviewed                          "

## 2024-03-27 NOTE — LETTER
2024    Rodolfo Govea MD  [address] [place] [id number]    Patient: Stormy Hermosillo   YOB: 1945   Date of Visit: 3/27/2024     Encounter Diagnosis     ICD-10-CM    1. Status post total right knee replacement  Z96.651       2. Unilateral primary osteoarthritis, right knee  M17.11       3. Chronic pain of right knee  M25.561     G89.29           Dear Dr. Govea:    Thank you for your recent referral of Stormy Hermosillo. Please review the attached evaluation summary from Stormy's recent visit.     Please verify that you agree with the plan of care by signing the attached order.     If you have any questions or concerns, please do not hesitate to call.     I sincerely appreciate the opportunity to share in the care of one of your patients and hope to have another opportunity to work with you in the near future.       Sincerely,    Aj Lemus PT      Referring Provider:      I certify that I have read the below Plan of Care and certify the need for these services furnished under this plan of treatment while under my care.                    Rodolfo Govea MD  [address] [place] [address]  Via Fax: [phone]          Progress Note     Today's date: 3/27/2024  Patient name: Stormy Hermosillo  : 1945  MRN: [id number]  Referring provider: Rodolfo Govea MD  Dx:   Encounter Diagnosis     ICD-10-CM    1. Status post total right knee replacement  Z96.651       2. Unilateral primary osteoarthritis, right knee  M17.11       3. Chronic pain of right knee  M25.561     G89.29           Start Time: 1035  Stop Time: 1115  Total time in clinic (min): 40 minutes    Subjective: The patient presents for the first follow-up appointment, was previously hospitalized for a DVT/PE concern. Currently the patient reports that post-op symptoms improved and that she was compliant some of the time with the initial HEP        Objective: See treatment diary  below    Objective     Observations     Additional Observation Details  The surgical site was examined, appropriate closure of the wound and scar formation. No abnormal drainage, effusion, discoloration, or odor was found. Evidence of 2-3 blisters along the line of the bandage that have since burst and are currently covered in scab.    [facility] DVT CRITERIA:  DVT Risk  (+1) Active cancer within last 6 months negative  (+1) Paralysis, paresis or recent plaster immobilization of legs  negative  (+1) Recent Bedridden for >3 days or major surgery within last 12 weeks  positive  (+1) Localized tenderness along deep venous system  positive  (+1) Entire leg swelling  negative  (+1) Calf swelling > 3cm compared with asymptomatic leg (10cm below tibial tuberosity)  negative  (+1) Pitting Edema  negative  (+1) Collateral supervicial veins (non-varicose)  negative  (+1) Previously documented DVT  positive    (-2)  Alternative diagnosis more likely (muscle tear, cellulitis, etc)  positive    Score: 0  DVT considered likely in patients with score of 2 or more, unlikely if less than 2    [place] PE CRITERIA:  PE Risk  (+3) Clinical symptoms of DVT negative  (+3) Other diagnosis less likely than pulmonary embolism  negative  (+1.5) Heart rate >100  positive  (+1.5) Immobilization (=3 days) or surgery in the previous four weeks  positive  (+1.5) Previous DVT/PE  positive  (+1) Hemoptysis  negative  (+1) Malignancy  negative    Score: 3  PE is considered likely in patients with score of 4 or more, unlikely if less than 4              Passive Range of Motion     Right Knee   Flexion: 100 degrees   Extension: -3 degrees     Swelling     Left Knee Girth Measurement (cm)   10 cm above joint line: 46 cm  10 cm below joint line: 39 cm    Right Knee Girth Measurement (cm)   10 cm above joint line: 54 cm  10 cm below joint line: 40.5 cm        Assessment: The patient tolerated manual and active treatment well today. The PT reviewed IHEP and  "introduced initial manual and ther-ex program during today's treatment. The patient demonstrated good response to today's treatment.        Goals  Short Term Goals: to be achieved by 4 weeks  1) Patient to be independent with basic HEP  2) Decrease pain to 4/10 at its worst  3) Increase knee ROM by 5-10 degrees in all affected planes  4) Increase LE strength by 1/2 MMT grade in all affected planes    Long Term Goals: to be achieved by discharge  1) FOTO equal to or greater than projected goal.  2) Ambulation to improve to maximal level of function  3) Stair negotiation will improve to reciprocal  4) Sit to stand transfers will improve to maximal level of function      Plan  Planned therapy interventions: manual therapy, neuromuscular re-education, patient education, therapeutic exercise, therapeutic activities, IADL retraining and home exercise program  Frequency: 2x week  Duration in weeks: 8  Treatment plan discussed with: patient       Precautions: standard precautions      Diagnosis S/p TKA on 3/12/24   Last Re-Evaluation    Date of Service 2/14 (pre-op) 2/21 3/15  (post-op) 3/27          Visit Count 1 2 3 4 5 6 7 8 9 10 11   Manuals              Manual stretching all planes    SRB          Patellar mobs all planes    SRB          Tibiofemoral mobs all planes    SRB                        Neuro Re-Ed              Quad set HEP 3 sec x15, HEP  3 sec x15          QS + SLR HEP             Glute set bridge HEP                                                                     Ther Ex              Active warmup for flexibility and edema control    NuStep x9 min           LAQ HEP 3x10, 5\" hold Review            Heel slides   HEP 1x10 w/ strap          Squats HEP 3x10 Review            Knee flex/ext              Leg press              Hamstring stretch HEP 3x30\" Modified w/ strap, 10 sec x5, HEP                                        Ther Activity              Stair training                                              "                                       Gait Training              Assistive device training              Level surface ambulation                                          Active Range of Motion              Right knee ext-flex              Passive Range of Motion              Right knee ext-flex    -3 - 100                        Assessment IE  IE, FOTO Wound check, DVT screen          Education HEP, pre op physical therapy compared to post op, objective findings Total joint home prep checklist provided and reviewed

## 2024-03-29 ENCOUNTER — OFFICE VISIT (OUTPATIENT)
Dept: PHYSICAL THERAPY | Facility: CLINIC | Age: 79
End: 2024-03-29
Payer: COMMERCIAL

## 2024-03-29 DIAGNOSIS — M25.561 CHRONIC PAIN OF RIGHT KNEE: ICD-10-CM

## 2024-03-29 DIAGNOSIS — Z96.651 STATUS POST TOTAL RIGHT KNEE REPLACEMENT: Primary | ICD-10-CM

## 2024-03-29 DIAGNOSIS — M17.11 UNILATERAL PRIMARY OSTEOARTHRITIS, RIGHT KNEE: ICD-10-CM

## 2024-03-29 DIAGNOSIS — G89.29 CHRONIC PAIN OF RIGHT KNEE: ICD-10-CM

## 2024-03-29 PROCEDURE — 97140 MANUAL THERAPY 1/> REGIONS: CPT | Performed by: PHYSICAL THERAPIST

## 2024-03-29 PROCEDURE — 97112 NEUROMUSCULAR REEDUCATION: CPT | Performed by: PHYSICAL THERAPIST

## 2024-03-29 PROCEDURE — 97110 THERAPEUTIC EXERCISES: CPT | Performed by: PHYSICAL THERAPIST

## 2024-03-29 NOTE — PROGRESS NOTES
"Daily Note     Today's date: 3/29/2024  Patient name: Stormy Hermosillo  : 1945  MRN: [id number]  Referring provider: Rodolfo Govea MD  Dx:   Encounter Diagnosis     ICD-10-CM    1. Status post total right knee replacement  Z96.651       2. Unilateral primary osteoarthritis, right knee  M17.11       3. Chronic pain of right knee  M25.561     G89.29           Start Time: 1003  Stop Time: 1045  Total time in clinic (min): 42 minutes    Subjective: The patient reports no new complaints today. The patient reports improvement in symptoms since previous session.        Objective: See treatment diary below      Assessment: The PT initiated therex program during today's session. The patient's program was modified to include introductory exercises  to promote recovery after TKA and subsequent hospitalization secondary to PE. The patient tolerated manual and active treatment fair today. The patient would benefit from further skilled PT services.        Plan: Continue per plan of care.      Precautions: standard precautions      Diagnosis S/p TKA on 3/12/24   Last Re-Evaluation    Date of Service  (pre-op) 2/21 3/15  (post-op) 3/27 3/29         Visit Count 1 2 3 4 5 6 7 8 9 10 11   Manuals              Manual stretching all planes    SRB SRB         Patellar mobs all planes    SRB SRB         Tibiofemoral mobs all planes    SRB SRB                       Neuro Re-Ed              Quad set HEP 3 sec x15, HEP  3 sec x15          QS + SLR HEP    W/ PT assist 3x10         Glute set bridge HEP    GS only 2x10                                                                 Ther Ex              Active warmup for flexibility and edema control    NuStep x9 min  Recumb bike semi-circles x6 min         LAQ HEP 3x10, 5\" hold Review            Heel slides   HEP 1x10 w/ strap          Squats HEP 3x10 Review            Knee flex/ext              Leg press              Hamstring stretch HEP 3x30\" Modified w/ strap, 10 sec x5, HEP   " "         Ashley     2x10 on R         Lunge     6\" step, lunge for knee flex ROM 3x10, HEP                                                   Ther Activity              Stair training                                                                                    Gait Training              Assistive device training              Level surface ambulation                                          Active Range of Motion              Right knee ext-flex              Passive Range of Motion              Right knee ext-flex    -3 - 100                        Assessment IE  IE, FOTO Wound check, DVT screen          Education HEP, pre op physical therapy compared to post op, objective findings Total joint home prep checklist provided and reviewed                            "

## 2024-04-01 ENCOUNTER — OFFICE VISIT (OUTPATIENT)
Dept: PHYSICAL THERAPY | Facility: CLINIC | Age: 79
End: 2024-04-01
Payer: COMMERCIAL

## 2024-04-01 DIAGNOSIS — Z96.651 STATUS POST TOTAL RIGHT KNEE REPLACEMENT: Primary | ICD-10-CM

## 2024-04-01 DIAGNOSIS — M17.11 UNILATERAL PRIMARY OSTEOARTHRITIS, RIGHT KNEE: ICD-10-CM

## 2024-04-01 DIAGNOSIS — G89.29 CHRONIC PAIN OF RIGHT KNEE: ICD-10-CM

## 2024-04-01 DIAGNOSIS — M25.561 CHRONIC PAIN OF RIGHT KNEE: ICD-10-CM

## 2024-04-01 PROCEDURE — 97140 MANUAL THERAPY 1/> REGIONS: CPT | Performed by: PHYSICAL THERAPIST

## 2024-04-01 PROCEDURE — 97530 THERAPEUTIC ACTIVITIES: CPT | Performed by: PHYSICAL THERAPIST

## 2024-04-01 PROCEDURE — 97110 THERAPEUTIC EXERCISES: CPT | Performed by: PHYSICAL THERAPIST

## 2024-04-01 NOTE — PROGRESS NOTES
"Daily Note     Today's date: 2024  Patient name: Stormy Hermosillo  : 1945  MRN: [id number]  Referring provider: Rodolfo Govea MD  Dx:   Encounter Diagnosis     ICD-10-CM    1. Status post total right knee replacement  Z96.651       2. Unilateral primary osteoarthritis, right knee  M17.11       3. Chronic pain of right knee  M25.561     G89.29           Start Time: 1401  Stop Time: 1445  Total time in clinic (min): 44 minutes    Subjective: The patient reports no new complaints today. The patient reports improvement in symptoms since previous session.        Objective: See treatment diary below      Assessment: The PT  emphasized motion  during today's session. The patient's program was modified to include manual stretching into both flexion and extension  to promote improved tibiofemoral mobility. The patient tolerated manual and active treatment well today. The patient would benefit from further skilled PT services.        Plan: Continue per plan of care.      Precautions: standard precautions      Diagnosis S/p TKA on 3/12/24   Last Re-Evaluation    Date of Service  (pre-op) 2/21 3/15  (post-op) 3/27 3/29 4/1        Visit Count 1 2 3 4 5 6 7 8 9 10 11   Manuals              Manual stretching all planes    SRB SRB SRB        Patellar mobs all planes    SRB SRB SRB        Tibiofemoral mobs all planes    SRB SRB SRB                      Neuro Re-Ed              Quad set HEP 3 sec x15, HEP  3 sec x15          QS + SLR HEP    W/ PT assist 3x10         Glute set bridge HEP    GS only 2x10                                                                 Ther Ex              Active warmup for flexibility and edema control    NuStep x9 min  Recumb bike semi-circles x6 min Recumb bike semi-circles x6 min        LAQ HEP 3x10, 5\" hold Review            Heel slides   HEP 1x10 w/ strap  1x15 w/ strap        Gastroc stretch      5 sec x10 w/ strap        Squats HEP 3x10 Review    STS from chair w/ BD foam, UE " "assist 2x5, HEP        Knee flex/ext              Leg press              Hamstring stretch HEP 3x30\" Modified w/ strap, 10 sec x5, HEP            Clamshells     2x10 on R         Lunge     6\" step, lunge for knee flex ROM 3x10, HEP                                                   Ther Activity              Stair training              Sidestepping      No resistance @ bar x3 full laps                                                                Gait Training              Assistive device training              Level surface ambulation                                          Active Range of Motion              Right knee ext-flex              Passive Range of Motion              Right knee ext-flex    -3 - 100                        Assessment IE  IE, FOTO Wound check, DVT screen          Education HEP, pre op physical therapy compared to post op, objective findings Total joint home prep checklist provided and reviewed                              "

## 2024-04-04 ENCOUNTER — OFFICE VISIT (OUTPATIENT)
Dept: PHYSICAL THERAPY | Facility: CLINIC | Age: 79
End: 2024-04-04
Payer: COMMERCIAL

## 2024-04-04 DIAGNOSIS — M25.561 CHRONIC PAIN OF RIGHT KNEE: ICD-10-CM

## 2024-04-04 DIAGNOSIS — G89.29 CHRONIC PAIN OF RIGHT KNEE: ICD-10-CM

## 2024-04-04 DIAGNOSIS — Z96.651 STATUS POST TOTAL RIGHT KNEE REPLACEMENT: Primary | ICD-10-CM

## 2024-04-04 DIAGNOSIS — M17.11 UNILATERAL PRIMARY OSTEOARTHRITIS, RIGHT KNEE: ICD-10-CM

## 2024-04-04 PROCEDURE — 97110 THERAPEUTIC EXERCISES: CPT | Performed by: PHYSICAL THERAPIST

## 2024-04-04 PROCEDURE — 97140 MANUAL THERAPY 1/> REGIONS: CPT | Performed by: PHYSICAL THERAPIST

## 2024-04-04 NOTE — PROGRESS NOTES
"Daily Note     Today's date: 2024  Patient name: Stormy Hermosillo  : 1945  MRN: [id number]  Referring provider: Rodolfo Govea MD  Dx:   Encounter Diagnosis     ICD-10-CM    1. Status post total right knee replacement  Z96.651       2. Unilateral primary osteoarthritis, right knee  M17.11       3. Chronic pain of right knee  M25.561     G89.29           Start Time: 1045  Stop Time: 1130  Total time in clinic (min): 45 minutes    Subjective: The patient presents today with a report of increased soreness and swelling. The patient reports worsening in symptoms since previous session.        Objective: See treatment diary below      Assessment: The PT continued to emphasize range of motion during today's session.  Manual treatment for tibiofemoral and patellofemoral mobility was emphasized during treatment today to promote improved motion with minimal pain. The patient tolerated manual and active treatment well today. The patient would benefit from further skilled PT services.        Plan: Continue per plan of care.      Precautions: standard precautions      Diagnosis S/p TKA on 3/12/24   Last Re-Evaluation    Date of Service  (pre-op) 2/21 3/15  (post-op) 3/27 3/29 4/1 4/4       Visit Count 1 2 3 4 5 6 7 8 9 10 11   Manuals              Manual stretching all planes    SRB SRB SRB SRB       Patellar mobs all planes    SRB SRB SRB SRB       Tibiofemoral mobs all planes    SRB SRB SRB SRB                     Neuro Re-Ed              Quad set HEP 3 sec x15, HEP  3 sec x15          QS + SLR HEP    W/ PT assist 3x10         Glute set bridge HEP    GS only 2x10                                                                 Ther Ex              Active warmup for flexibility and edema control    NuStep x9 min  Recumb bike semi-circles x6 min Recumb bike semi-circles x6 min Recumb bike semi-circles x8 min       LAQ HEP 3x10, 5\" hold Review            Heel slides   HEP 1x10 w/ strap  1x15 w/ strap      " "  Gastroc stretch      5 sec x10 w/ strap        Squats HEP 3x10 Review    STS from chair w/ BD foam, UE assist 2x5, HEP        Knee flex/ext              Leg press              Hamstring stretch HEP 3x30\" Modified w/ strap, 10 sec x5, HEP            Clamshells     2x10 on R         Lunge     6\" step, lunge for knee flex ROM 3x10, HEP                                                   Ther Activity              Stair training              Sidestepping      No resistance @ bar x3 full laps                                                                Gait Training              Assistive device training              Level surface ambulation                                          Active Range of Motion              Right knee ext-flex              Passive Range of Motion              Right knee ext-flex    -3 - 100   -2 - 115                     Assessment IE  IE, FOTO Wound check, DVT screen          Education HEP, pre op physical therapy compared to post op, objective findings Total joint home prep checklist provided and reviewed                                "

## 2024-04-08 ENCOUNTER — OFFICE VISIT (OUTPATIENT)
Dept: PHYSICAL THERAPY | Facility: CLINIC | Age: 79
End: 2024-04-08
Payer: COMMERCIAL

## 2024-04-08 DIAGNOSIS — Z96.651 STATUS POST TOTAL RIGHT KNEE REPLACEMENT: Primary | ICD-10-CM

## 2024-04-08 DIAGNOSIS — M25.561 CHRONIC PAIN OF RIGHT KNEE: ICD-10-CM

## 2024-04-08 DIAGNOSIS — G89.29 CHRONIC PAIN OF RIGHT KNEE: ICD-10-CM

## 2024-04-08 DIAGNOSIS — M17.11 UNILATERAL PRIMARY OSTEOARTHRITIS, RIGHT KNEE: ICD-10-CM

## 2024-04-08 PROCEDURE — 97110 THERAPEUTIC EXERCISES: CPT | Performed by: PHYSICAL THERAPIST

## 2024-04-08 PROCEDURE — 97140 MANUAL THERAPY 1/> REGIONS: CPT | Performed by: PHYSICAL THERAPIST

## 2024-04-08 NOTE — PROGRESS NOTES
"Daily Note     Today's date: 2024  Patient name: Stormy Hermosillo  : 1945  MRN: [id number]  Referring provider: Rodolfo Govea MD  Dx:   Encounter Diagnosis     ICD-10-CM    1. Status post total right knee replacement  Z96.651       2. Unilateral primary osteoarthritis, right knee  M17.11       3. Chronic pain of right knee  M25.561     G89.29           Start Time: 0815  Stop Time: 0900  Total time in clinic (min): 45 minutes    Subjective: The patient reports no new complaints today. The patient reports improvement in symptoms since previous session.        Objective: See treatment diary below      Assessment: The PT continued to promote mobility and symptom control during today's session.  PRE training was resumed as a component of the patient's program to promote LE ability. The patient tolerated manual and active treatment well today. The patient would benefit from further skilled PT services.        Plan: Continue per plan of care.      Precautions: standard precautions      Diagnosis S/p TKA on 3/12/24   Last Re-Evaluation    Date of Service  (pre-op) 2/21 3/15  (post-op) 3/27 3/29 4/1 4/4 4/8      Visit Count 1 2 3 4 5 6 7 8 9 10 11   Manuals              Manual stretching all planes    SRB SRB SRB SRB SRB      Patellar mobs all planes    SRB SRB SRB SRB SRB      Tibiofemoral mobs all planes    SRB SRB SRB SRB SRB                    Neuro Re-Ed              Quad set HEP 3 sec x15, HEP  3 sec x15          QS + SLR HEP    W/ PT assist 3x10         Glute set bridge HEP    GS only 2x10                                                                 Ther Ex              Active warmup for flexibility and edema control    NuStep x9 min  Recumb bike semi-circles x6 min Recumb bike semi-circles x6 min Recumb bike semi-circles x8 min NuStep x8 min      LAQ HEP 3x10, 5\" hold Review            Heel slides   HEP 1x10 w/ strap  1x15 w/ strap        Gastroc stretch      5 sec x10 w/ strap        Squats HEP " "3x10 Review    STS from chair w/ BD foam, UE assist 2x5, HEP        Knee flex/ext        11# 2x10 ea      Leg press              Hamstring stretch HEP 3x30\" Modified w/ strap, 10 sec x5, HEP            Clamshells     2x10 on R         Lunge     6\" step, lunge for knee flex ROM 3x10, HEP                                                   Ther Activity              Stair training              Sidestepping      No resistance @ bar x3 full laps                                                                Gait Training              Assistive device training              Level surface ambulation                                          Active Range of Motion              Right knee ext-flex              Passive Range of Motion              Right knee ext-flex    -3 - 100   -2 - 115 -1 - 122                    Assessment IE  IE, FOTO Wound check, DVT screen          Education HEP, pre op physical therapy compared to post op, objective findings Total joint home prep checklist provided and reviewed                                  "

## 2024-04-11 ENCOUNTER — OFFICE VISIT (OUTPATIENT)
Dept: PHYSICAL THERAPY | Facility: CLINIC | Age: 79
End: 2024-04-11
Payer: COMMERCIAL

## 2024-04-11 DIAGNOSIS — M17.11 UNILATERAL PRIMARY OSTEOARTHRITIS, RIGHT KNEE: ICD-10-CM

## 2024-04-11 DIAGNOSIS — G89.29 CHRONIC PAIN OF RIGHT KNEE: ICD-10-CM

## 2024-04-11 DIAGNOSIS — M25.561 CHRONIC PAIN OF RIGHT KNEE: ICD-10-CM

## 2024-04-11 DIAGNOSIS — Z96.651 STATUS POST TOTAL RIGHT KNEE REPLACEMENT: Primary | ICD-10-CM

## 2024-04-11 PROCEDURE — 97140 MANUAL THERAPY 1/> REGIONS: CPT | Performed by: PHYSICAL THERAPIST

## 2024-04-11 PROCEDURE — 97110 THERAPEUTIC EXERCISES: CPT | Performed by: PHYSICAL THERAPIST

## 2024-04-11 NOTE — PROGRESS NOTES
"Daily Note     Today's date: 2024  Patient name: Stormy Hermosillo  : 1945  MRN: [id number]  Referring provider: Rodolfo Govea MD  Dx:   Encounter Diagnosis     ICD-10-CM    1. Status post total right knee replacement  Z96.651       2. Unilateral primary osteoarthritis, right knee  M17.11       3. Chronic pain of right knee  M25.561     G89.29           Start Time: 0848  Stop Time: 930  Total time in clinic (min): 42 minutes    Subjective: The patient reports no new complaints today. The patient reports improvement in symptoms since previous session.        Objective: See treatment diary below      Assessment: The PT modified care as needed during today's session. The patient's program was maintained with continued performance of mobility treatments  to promote increased range of motion. The patient tolerated manual and active treatment well today. The patient would benefit from further skilled PT services.        Plan: Continue per plan of care.      Precautions: standard precautions      Diagnosis S/p TKA on 3/12/24   Last Re-Evaluation    Date of Service  (pre-op) 2/21 3/15  (post-op) 3/27 3/29 4/1 4/4 4/8 4/11     Visit Count 1 2 3 4 5 6 7 8 9 10 11   Manuals              Manual stretching all planes    SRB SRB SRB SRB SRB SRB     Patellar mobs all planes    SRB SRB SRB SRB SRB SRB     Tibiofemoral mobs all planes    SRB SRB SRB SRB SRB SRB                   Neuro Re-Ed              Quad set HEP 3 sec x15, HEP  3 sec x15          QS + SLR HEP    W/ PT assist 3x10         Glute set bridge HEP    GS only 2x10                                                                 Ther Ex              Active warmup for flexibility and edema control    NuStep x9 min  Recumb bike semi-circles x6 min Recumb bike semi-circles x6 min Recumb bike semi-circles x8 min NuStep x8 min Recumb bike semi-circles x8 min     LAQ HEP 3x10, 5\" hold Review            Heel slides   HEP 1x10 w/ strap  1x15 w/ strap      " "  Gastroc stretch      5 sec x10 w/ strap        Squats HEP 3x10 Review    STS from chair w/ BD foam, UE assist 2x5, HEP        Knee flex/ext        11# 2x10 ea 11# 2x10 ea     Leg press              Hamstring stretch HEP 3x30\" Modified w/ strap, 10 sec x5, HEP            Clamshells     2x10 on R         Lunge     6\" step, lunge for knee flex ROM 3x10, HEP         Gastroc stretch         10 sec x10                                 Ther Activity              Stair training              Sidestepping      No resistance @ bar x3 full laps                                                                Gait Training              Assistive device training              Level surface ambulation                                          Active Range of Motion              Right knee ext-flex              Passive Range of Motion              Right knee ext-flex    -3 - 100   -2 - 115 -1 - 122 0-120                   Assessment IE  IE, FOTO Wound check, DVT screen     FOTO     Education HEP, pre op physical therapy compared to post op, objective findings Total joint home prep checklist provided and reviewed                                    "

## 2024-04-15 ENCOUNTER — OFFICE VISIT (OUTPATIENT)
Dept: PHYSICAL THERAPY | Facility: CLINIC | Age: 79
End: 2024-04-15
Payer: COMMERCIAL

## 2024-04-15 DIAGNOSIS — Z96.651 STATUS POST TOTAL RIGHT KNEE REPLACEMENT: Primary | ICD-10-CM

## 2024-04-15 DIAGNOSIS — G89.29 CHRONIC PAIN OF RIGHT KNEE: ICD-10-CM

## 2024-04-15 DIAGNOSIS — M25.561 CHRONIC PAIN OF RIGHT KNEE: ICD-10-CM

## 2024-04-15 DIAGNOSIS — M17.11 UNILATERAL PRIMARY OSTEOARTHRITIS, RIGHT KNEE: ICD-10-CM

## 2024-04-15 PROCEDURE — 97140 MANUAL THERAPY 1/> REGIONS: CPT | Performed by: PHYSICAL THERAPIST

## 2024-04-15 PROCEDURE — 97530 THERAPEUTIC ACTIVITIES: CPT | Performed by: PHYSICAL THERAPIST

## 2024-04-15 NOTE — PROGRESS NOTES
"Daily Note     Today's date: 4/15/2024  Patient name: Stormy Hermosillo  : 1945  MRN: [id number]  Referring provider: Rodolfo Govea MD  Dx:   Encounter Diagnosis     ICD-10-CM    1. Status post total right knee replacement  Z96.651       2. Unilateral primary osteoarthritis, right knee  M17.11       3. Chronic pain of right knee  M25.561     G89.29           Start Time: 0951  Stop Time: 1030  Total time in clinic (min): 39 minutes    Subjective: The patient reports no new complaints today. The patient reports improvement in symptoms since previous session.        Objective: See treatment diary below      Assessment: The PT continued to advance as tolerated during today's session.  Closed chain loading was emphasized during treatment today to promote functional improvement and safety. The patient tolerated manual and active treatment well today. The patient would benefit from further skilled PT services.        Plan: Continue per plan of care.      Precautions: standard precautions      Diagnosis S/p TKA on 3/12/24   Last Re-Evaluation    Date of Service  (pre-op) 2/21 3/15  (post-op) 3/27 3/29 4/1 4/4 4/8 4/11 4/15    Visit Count 1 2 3 4 5 6 7 8 9 10 11   Manuals              Manual stretching all planes    SRB SRB SRB SRB SRB SRB SRB    Patellar mobs all planes    SRB SRB SRB SRB SRB SRB SRB    Tibiofemoral mobs all planes    SRB SRB SRB SRB SRB SRB SRB                  Neuro Re-Ed              Quad set HEP 3 sec x15, HEP  3 sec x15          QS + SLR HEP    W/ PT assist 3x10         Glute set bridge HEP    GS only 2x10         TKE          5# @ uday 3x10                                               Ther Ex              Active warmup for flexibility and edema control    NuStep x9 min  Recumb bike semi-circles x6 min Recumb bike semi-circles x6 min Recumb bike semi-circles x8 min NuStep x8 min Recumb bike semi-circles x8 min Recumb bike semi-circles x8 min    LAQ HEP 3x10, 5\" hold Review          " "  Heel slides   HEP 1x10 w/ strap  1x15 w/ strap        Gastroc stretch      5 sec x10 w/ strap        Squats HEP 3x10 Review    STS from chair w/ BD foam, UE assist 2x5, HEP    STS from hi-lo, progressed depth, 3x10    Knee flex/ext        11# 2x10 ea 11# 2x10 ea     Leg press              Hamstring stretch HEP 3x30\" Modified w/ strap, 10 sec x5, HEP            Clamshells     2x10 on R         Lunge     6\" step, lunge for knee flex ROM 3x10, HEP         Gastroc stretch         10 sec x10                                 Ther Activity              Stair training          Up & down 6\" side x2, 4\" side lateral step-up 2x15 ea side    Sidestepping      No resistance @ bar x3 full laps                                                                Gait Training              Assistive device training              Level surface ambulation                                          Active Range of Motion              Right knee ext-flex              Passive Range of Motion              Right knee ext-flex    -3 - 100   -2 - 115 -1 - 122 0-120                   Assessment IE  IE, FOTO Wound check, DVT screen     FOTO     Education HEP, pre op physical therapy compared to post op, objective findings Total joint home prep checklist provided and reviewed                                      "

## 2024-04-18 ENCOUNTER — OFFICE VISIT (OUTPATIENT)
Dept: PHYSICAL THERAPY | Facility: CLINIC | Age: 79
End: 2024-04-18
Payer: COMMERCIAL

## 2024-04-18 DIAGNOSIS — Z96.651 STATUS POST TOTAL RIGHT KNEE REPLACEMENT: Primary | ICD-10-CM

## 2024-04-18 DIAGNOSIS — G89.29 CHRONIC PAIN OF RIGHT KNEE: ICD-10-CM

## 2024-04-18 DIAGNOSIS — M17.11 UNILATERAL PRIMARY OSTEOARTHRITIS, RIGHT KNEE: ICD-10-CM

## 2024-04-18 DIAGNOSIS — M25.561 CHRONIC PAIN OF RIGHT KNEE: ICD-10-CM

## 2024-04-18 PROCEDURE — 97140 MANUAL THERAPY 1/> REGIONS: CPT | Performed by: PHYSICAL THERAPIST

## 2024-04-18 PROCEDURE — 97112 NEUROMUSCULAR REEDUCATION: CPT | Performed by: PHYSICAL THERAPIST

## 2024-04-18 NOTE — PROGRESS NOTES
Progress Note & Discharge    Today's date: 2024  Patient name: Stormy Hermosillo  : 1945  MRN: [id number]  Referring provider: Rodolfo Govea MD  Dx:   Encounter Diagnosis     ICD-10-CM    1. Status post total right knee replacement  Z96.651       2. Unilateral primary osteoarthritis, right knee  M17.11       3. Chronic pain of right knee  M25.561     G89.29           Start Time: 0945  Stop Time: 1012  Total time in clinic (min): 27 minutes    Subjective: The patient presents for re-evaluation today. The patient reports improvement in symptoms since beginning skilled physical therapy. The patient reports 4/10 pain at it's worst over the past 24 hours, and reports 90% improvement in overall condition since beginning formal PT care. The patient's chief remaining concern is the residual stiffness after periods of inactivity.        Objective: See treatment diary below      Tenderness     Right Knee   Tenderness in the tibial tubercle.     Active Range of Motion   Left Knee   Flexion: 124 degrees   Extension: 0 degrees     Right Knee   Flexion: 120 degrees   Extension: 0 degrees     Strength/Myotome Testing     Left Knee   Flexion: 5  Extension: 5  Quadriceps contraction: good    Right Knee   Flexion: 5  Extension: 5-  Quadriceps contraction: good    Swelling     Left Knee Girth Measurement (cm)   10 cm below joint line: 39.5 cm    Right Knee Girth Measurement (cm)   10 cm below joint line: 41.5 cm    Ambulation   Weight-Bearing Status   Assistive device used: none    Ambulation: Level Surfaces   Ambulation without assistive device: independent    Observational Gait   Slightly decreased right stance time.     Functional Assessment        Comments  Sit to stand: extends right knee during sit to stand and weight bears more heavily on left LE. Increased UE use.      Assessment: Stormy Hermosillo is a pleasant 78 y.o. female who has been receiving PT intervention following a right TKA. The patient has  demonstrated decreased pain, increased strength, increased ROM, increased joint mobility, improved body mechanics, improved gait mechanics , and increased activity tolerance since beginning treatment.      Primary remaining impairments include:    1)  Residual stiffness with inactivity        Goals  Short Term Goals: to be achieved by 4 weeks  1) Patient to be independent with basic HEP  2) Decrease pain to 4/10 at its worst  3) Increase knee ROM by 5-10 degrees in all affected planes  4) Increase LE strength by 1/2 MMT grade in all affected planes    Long Term Goals: to be achieved by discharge  1) FOTO equal to or greater than projected goal.  2) Ambulation to improve to maximal level of function  3) Stair negotiation will improve to reciprocal  4) Sit to stand transfers will improve to maximal level of function      Plan: The patient has met or exceeded her short & long term goals and is a candidate for discharge from formal PT care to an independent home program.       Precautions: standard precautions      Diagnosis S/p TKA on 3/12/24   Last Re-Evaluation    Date of Service 2/14 (pre-op) 2/21 3/15  (post-op) 3/27 3/29 4/1 4/4 4/8 4/11 4/15 4/18   Visit Count 1 2 3 4 5 6 7 8 9 10 11   Manuals              Manual stretching all planes    SRB SRB SRB SRB SRB SRB SRB    Patellar mobs all planes    SRB SRB SRB SRB SRB SRB SRB    Tibiofemoral mobs all planes    SRB SRB SRB SRB SRB SRB SRB                  Neuro Re-Ed              Quad set HEP 3 sec x15, HEP  3 sec x15          QS + SLR HEP    W/ PT assist 3x10         Glute set bridge HEP    GS only 2x10         TKE          5# @ uday 3x10                                               Ther Ex              Active warmup for flexibility and edema control    NuStep x9 min  Recumb bike semi-circles x6 min Recumb bike semi-circles x6 min Recumb bike semi-circles x8 min NuStep x8 min Recumb bike semi-circles x8 min Recumb bike semi-circles x8 min Recumb bike full circles  "x5 min   LAQ HEP 3x10, 5\" hold Review            Heel slides   HEP 1x10 w/ strap  1x15 w/ strap        Gastroc stretch      5 sec x10 w/ strap        Squats HEP 3x10 Review    STS from chair w/ BD foam, UE assist 2x5, HEP    STS from hi-lo, progressed depth, 3x10    Knee flex/ext        11# 2x10 ea 11# 2x10 ea     Leg press              Hamstring stretch HEP 3x30\" Modified w/ strap, 10 sec x5, HEP            Clamshells     2x10 on R         Lunge     6\" step, lunge for knee flex ROM 3x10, HEP         Gastroc stretch         10 sec x10                                 Ther Activity              Stair training          Up & down 6\" side x2, 4\" side lateral step-up 2x15 ea side    Sidestepping      No resistance @ bar x3 full laps                                                                Gait Training              Assistive device training              Level surface ambulation                                          Active Range of Motion              Right knee ext-flex              Passive Range of Motion              Right knee ext-flex    -3 - 100   -2 - 115 -1 - 122 0-120  0-120                 Assessment IE  IE, FOTO Wound check, DVT screen     FOTO  RE, FOTO   Education HEP, pre op physical therapy compared to post op, objective findings Total joint home prep checklist provided and reviewed         Exam [facility] [place] planning                               "

## 2024-05-06 ENCOUNTER — CONSULT (OUTPATIENT)
Dept: CARDIOLOGY CLINIC | Facility: CLINIC | Age: 79
End: 2024-05-06
Payer: COMMERCIAL

## 2024-05-06 VITALS
WEIGHT: 178.8 LBS | DIASTOLIC BLOOD PRESSURE: 68 MMHG | BODY MASS INDEX: 28.86 KG/M2 | OXYGEN SATURATION: 97 % | HEART RATE: 85 BPM | SYSTOLIC BLOOD PRESSURE: 124 MMHG

## 2024-05-06 DIAGNOSIS — I48.92 ATRIAL FLUTTER, PAROXYSMAL (HCC): ICD-10-CM

## 2024-05-06 DIAGNOSIS — I26.99 PULMONARY EMBOLISM, BILATERAL (HCC): Primary | ICD-10-CM

## 2024-05-06 DIAGNOSIS — E78.2 MIXED HYPERLIPIDEMIA: ICD-10-CM

## 2024-05-06 DIAGNOSIS — I49.3 PVC'S (PREMATURE VENTRICULAR CONTRACTIONS): ICD-10-CM

## 2024-05-06 PROCEDURE — 93000 ELECTROCARDIOGRAM COMPLETE: CPT | Performed by: INTERNAL MEDICINE

## 2024-05-06 PROCEDURE — 99204 OFFICE O/P NEW MOD 45 MIN: CPT | Performed by: INTERNAL MEDICINE

## 2024-05-06 NOTE — PROGRESS NOTES
Cardiology   Stormy Hermosillo 78 y.o. female MRN: [id number]        Impression:  Paroxysmal atrial flutter - likely provoked by pulmonary emboli.  Now in NSR on anticoagulation.   Pulmonary emboli - on anticoagulation.  PVCs - benign  Dyslipidemia - on statin    Recommendations:  Continue current medications.  Await echo in approx 2 months.   Will check 1 week Zio monitor for evidence of sub-clinical atrial flutter.   Follow up in 3 months. Will make decision re: long term anticoagulation.      HPI: Stormy Hermosillo is a 78 y.o. year old female with dyslipidemia, PVCs, and recent bilateral PE in setting of R TKA 3/24 c/o episodic atrial flutter while in hospital, who presents for evaluation.  At time of PE, patient had echo which demonstrated normal LV function, mildly dilated RV with mildly reduced RV systolic function, mod TR with mod PHTN.  Currently asymptomatic.  No chest pain, shortness of breath, or palpitations.         Review of Systems   Constitutional: Negative.    HENT: Negative.     Eyes: Negative.    Respiratory:  Negative for chest tightness and shortness of breath.    Cardiovascular:  Negative for chest pain, palpitations and leg swelling.   Gastrointestinal: Negative.    Endocrine: Negative.    Genitourinary: Negative.    Musculoskeletal: Negative.    Skin: Negative.    Allergic/Immunologic: Negative.    Neurological: Negative.    Hematological: Negative.    Psychiatric/Behavioral: Negative.     All other systems reviewed and are negative.        Past Medical History:   Diagnosis Date    Atrial flutter (HCC)      Past Surgical History:   Procedure Laterality Date    BACK SURGERY      BREAST SURGERY      FOOT SURGERY      HIP SURGERY Left     KNEE SURGERY Left      Social History     Substance and Sexual Activity   Alcohol Use Yes    Comment: rarely     Social History     Substance and Sexual Activity   Drug Use No     Social History     Tobacco Use   Smoking Status Never   Smokeless Tobacco  Never     Family History   Problem Relation Age of Onset    Heart disease Mother     Cancer Mother     Varicose Veins Mother     Heart disease Father     Heart disease Sister     Cancer Sister     Cancer Brother     Varicose Veins Maternal Grandmother        Allergies:  Allergies   Allergen Reactions    Tramadol GI Intolerance       Medications:     Current Outpatient Medications:     acetaminophen (TYLENOL) 500 mg tablet, Take 500 mg by mouth every 6 (six) hours, Disp: , Rfl:     amLODIPine (NORVASC) 5 mg tablet, Take 5 mg by mouth daily, Disp: , Rfl:     apixaban (Eliquis) 5 mg, Take 1 tablet (5 mg total) by mouth 2 (two) times a day, Disp: 180 tablet, Rfl: 0    aspirin 81 MG tablet, Take 81 mg by mouth 2 (two) times a day, Disp: , Rfl:     atorvastatin (LIPITOR) 10 mg tablet, Take 10 mg by mouth daily, Disp: , Rfl: 0    Calcium Carbonate-Vitamin D (CALCIUM 600+D PO), Take by mouth, Disp: , Rfl:     guaiFENesin (MUCINEX) 600 mg 12 hr tablet, Take 600 mg by mouth, Disp: , Rfl:     metoprolol succinate (TOPROL-XL) 25 mg 24 hr tablet, Take 1 tablet (25 mg total) by mouth daily, Disp: 30 tablet, Rfl: 0    mometasone (NASONEX) 50 mcg/act nasal spray, 2 sprays daily, Disp: , Rfl:     Multiple Vitamins-Minerals (MULTIVITAMIN ADULT PO), Take 1 tablet by mouth, Disp: , Rfl:     omeprazole (PriLOSEC) 20 mg delayed release capsule, Take 20 mg by mouth 2 (two) times a day, Disp: , Rfl:     acetaminophen-codeine (TYLENOL with CODEINE #3) 300-30 MG per tablet, take 1 tablet by mouth every 6 hours if needed for MILD TO MODERATE PAIN (Patient not taking: Reported on 5/6/2024), Disp: , Rfl:     Elastic Bandages & Supports (MEDICAL COMPRESSION STOCKINGS) MISC, by Does not apply route daily Knee High 20-30mmHg, Disp: 2 each, Rfl: 4    fexofenadine (ALLEGRA) 180 MG tablet, Take 180 mg by mouth (Patient not taking: Reported on 5/6/2024), Disp: , Rfl:     gabapentin (NEURONTIN) 100 mg capsule, take 1 capsule by mouth nightly (Patient  not taking: Reported on 3/25/2024), Disp: , Rfl:     methocarbamol (ROBAXIN) 500 mg tablet, Take 500 mg by mouth 4 (four) times a day as needed (Patient not taking: Reported on 5/6/2024), Disp: , Rfl:     mupirocin (BACTROBAN) 2 % ointment, apply A SMALL AMOUNT TO BOTH NOSTRILS twice a day for 5 days PRIOR TO SURGERY (Patient not taking: Reported on 3/25/2024), Disp: , Rfl:     oxyCODONE (ROXICODONE) 5 immediate release tablet, take 1 tablet by mouth every 6 hours if needed for MODERATE TO SEVERE PAIN (Patient not taking: Reported on 3/25/2024), Disp: , Rfl:     Current Facility-Administered Medications:     bupivacaine (MARCAINE) 0.25 % injection 0.5 mL, 0.5 mL, Injection, , Bharath Uribe, DPM, 0.5 mL at 12/14/22 1559  bupivacaine, 0.5 mL,         Wt Readings from Last 3 Encounters:   05/06/24 81.1 kg (178 lb 12.8 oz)   03/25/24 81.5 kg (179 lb 9.6 oz)   03/21/24 81.5 kg (179 lb 10.8 oz)     Temp Readings from Last 3 Encounters:   03/25/24 98.9 °F (37.2 °C) (Tympanic)   03/21/24 98.1 °F (36.7 °C)   08/04/23 98.3 °F (36.8 °C) (Oral)     BP Readings from Last 3 Encounters:   05/06/24 124/68   03/27/24 113/71   03/25/24 148/90     Pulse Readings from Last 3 Encounters:   05/06/24 85   03/27/24 72   03/25/24 74         Physical Exam  HENT:      Head: Atraumatic.      Mouth/Throat:      Mouth: Mucous membranes are moist.   Eyes:      Extraocular Movements: Extraocular movements intact.   Cardiovascular:      Rate and Rhythm: Normal rate and regular rhythm.      Heart sounds: Normal heart sounds.   Pulmonary:      Effort: Pulmonary effort is normal.      Breath sounds: Normal breath sounds.   Abdominal:      General: Abdomen is flat.   Musculoskeletal:         General: Normal range of motion.      Cervical back: Normal range of motion.   Skin:     General: Skin is warm.   Neurological:      General: No focal deficit present.      Mental Status: She is alert and oriented to person, place, and time.   Psychiatric:        "  Mood and Affect: Mood normal.         Behavior: Behavior normal.           Laboratory Studies:  CMP:  Lab Results   Component Value Date    K 4.4 03/20/2024     03/20/2024    CO2 26 03/20/2024    BUN 16 03/20/2024    CREATININE 0.79 03/20/2024    AST 23 03/18/2024    ALT 27 03/18/2024    EGFR 71 03/20/2024       Lipid Profile:   No results found for: \"CHOL\"  Lab Results   Component Value Date    HDL 64 01/02/2024     Lab Results   Component Value Date    LDLCALC 66 01/02/2024     Lab Results   Component Value Date    TRIG 90 01/02/2024       Cardiac testing:   EKG reviewed personally: NSR 78 PVCs          "

## 2024-05-24 ENCOUNTER — CLINICAL SUPPORT (OUTPATIENT)
Dept: CARDIOLOGY CLINIC | Facility: CLINIC | Age: 79
End: 2024-05-24
Payer: COMMERCIAL

## 2024-05-24 DIAGNOSIS — I48.92 ATRIAL FLUTTER, PAROXYSMAL (HCC): ICD-10-CM

## 2024-05-24 PROCEDURE — 93244 EXT ECG>48HR<7D REV&INTERPJ: CPT

## 2024-06-19 ENCOUNTER — APPOINTMENT (EMERGENCY)
Dept: CT IMAGING | Facility: HOSPITAL | Age: 79
End: 2024-06-19
Payer: COMMERCIAL

## 2024-06-19 ENCOUNTER — HOSPITAL ENCOUNTER (EMERGENCY)
Facility: HOSPITAL | Age: 79
Discharge: HOME | End: 2024-06-19
Attending: EMERGENCY MEDICINE
Payer: COMMERCIAL

## 2024-06-19 ENCOUNTER — APPOINTMENT (EMERGENCY)
Dept: RADIOLOGY | Facility: HOSPITAL | Age: 79
End: 2024-06-19
Payer: COMMERCIAL

## 2024-06-19 VITALS
OXYGEN SATURATION: 95 % | TEMPERATURE: 98 F | HEART RATE: 82 BPM | RESPIRATION RATE: 16 BRPM | SYSTOLIC BLOOD PRESSURE: 146 MMHG | DIASTOLIC BLOOD PRESSURE: 87 MMHG
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
## 2024-06-19 DIAGNOSIS — S52.502B TYPE I OR II OPEN FRACTURE OF DISTAL END OF LEFT RADIUS, UNSPECIFIED FRACTURE MORPHOLOGY, INITIAL ENCOUNTER: Primary | ICD-10-CM

## 2024-06-19 DIAGNOSIS — S52.502A DISTAL RADIUS FRACTURE, LEFT: ICD-10-CM

## 2024-06-19 DIAGNOSIS — S61.512A TEAR OF SKIN OF LEFT WRIST, INITIAL ENCOUNTER: ICD-10-CM

## 2024-06-19 LAB
ABO GROUP BLD: NORMAL
ALBUMIN SERPL BCP-MCNC: 4.1 G/DL (ref 3.5–5)
ALP SERPL-CCNC: 77 U/L (ref 34–104)
ALT SERPL W P-5'-P-CCNC: 15 U/L (ref 7–52)
ANION GAP SERPL CALCULATED.3IONS-SCNC: 7 MMOL/L (ref 4–13)
AST SERPL W P-5'-P-CCNC: 17 U/L (ref 13–39)
BASOPHILS # BLD AUTO: 0.06 THOUSANDS/ÂΜL (ref 0–0.1)
BASOPHILS NFR BLD AUTO: 1 % (ref 0–1)
BILIRUB SERPL-MCNC: 0.29 MG/DL (ref 0.2–1)
BLD GP AB SCN SERPL QL: NEGATIVE
BUN SERPL-MCNC: 20 MG/DL (ref 5–25)
CALCIUM SERPL-MCNC: 9.4 MG/DL (ref 8.4–10.2)
CHLORIDE SERPL-SCNC: 105 MMOL/L (ref 96–108)
CO2 SERPL-SCNC: 29 MMOL/L (ref 21–32)
CREAT SERPL-MCNC: 0.81 MG/DL (ref 0.6–1.3)
EOSINOPHIL # BLD AUTO: 0.11 THOUSAND/ÂΜL (ref 0–0.61)
EOSINOPHIL NFR BLD AUTO: 1 % (ref 0–6)
ERYTHROCYTE [DISTWIDTH] IN BLOOD BY AUTOMATED COUNT: 13.6 % (ref 11.6–15.1)
GFR SERPL CREATININE-BSD FRML MDRD: 69 ML/MIN/1.73SQ M
GLUCOSE SERPL-MCNC: 99 MG/DL (ref 65–140)
HCT VFR BLD AUTO: 44.8 % (ref 34.8–46.1)
HGB BLD-MCNC: 14.2 G/DL (ref 11.5–15.4)
IMM GRANULOCYTES # BLD AUTO: 0.02 THOUSAND/UL (ref 0–0.2)
IMM GRANULOCYTES NFR BLD AUTO: 0 % (ref 0–2)
LYMPHOCYTES # BLD AUTO: 2.02 THOUSANDS/ÂΜL (ref 0.6–4.47)
LYMPHOCYTES NFR BLD AUTO: 23 % (ref 14–44)
MCH RBC QN AUTO: 28.7 PG (ref 26.8–34.3)
MCHC RBC AUTO-ENTMCNC: 31.7 G/DL (ref 31.4–37.4)
MCV RBC AUTO: 91 FL (ref 82–98)
MONOCYTES # BLD AUTO: 0.79 THOUSAND/ÂΜL (ref 0.17–1.22)
MONOCYTES NFR BLD AUTO: 9 % (ref 4–12)
NEUTROPHILS # BLD AUTO: 5.69 THOUSANDS/ÂΜL (ref 1.85–7.62)
NEUTS SEG NFR BLD AUTO: 66 % (ref 43–75)
NRBC BLD AUTO-RTO: 0 /100 WBCS
PLATELET # BLD AUTO: 329 THOUSANDS/UL (ref 149–390)
PMV BLD AUTO: 10.1 FL (ref 8.9–12.7)
POTASSIUM SERPL-SCNC: 4.4 MMOL/L (ref 3.5–5.3)
PROT SERPL-MCNC: 6.9 G/DL (ref 6.4–8.4)
RBC # BLD AUTO: 4.94 MILLION/UL (ref 3.81–5.12)
RH BLD: POSITIVE
SODIUM SERPL-SCNC: 141 MMOL/L (ref 135–147)
SPECIMEN EXPIRATION DATE: NORMAL
WBC # BLD AUTO: 8.69 THOUSAND/UL (ref 4.31–10.16)

## 2024-06-19 PROCEDURE — 85025 COMPLETE CBC W/AUTO DIFF WBC: CPT

## 2024-06-19 PROCEDURE — 99285 EMERGENCY DEPT VISIT HI MDM: CPT | Performed by: EMERGENCY MEDICINE

## 2024-06-19 PROCEDURE — 80053 COMPREHEN METABOLIC PANEL: CPT

## 2024-06-19 PROCEDURE — 90715 TDAP VACCINE 7 YRS/> IM: CPT

## 2024-06-19 PROCEDURE — 93005 ELECTROCARDIOGRAM TRACING: CPT

## 2024-06-19 PROCEDURE — 86850 RBC ANTIBODY SCREEN: CPT

## 2024-06-19 PROCEDURE — 86901 BLOOD TYPING SEROLOGIC RH(D): CPT

## 2024-06-19 PROCEDURE — 86900 BLOOD TYPING SEROLOGIC ABO: CPT

## 2024-06-19 PROCEDURE — 29125 APPL SHORT ARM SPLINT STATIC: CPT | Performed by: STUDENT IN AN ORGANIZED HEALTH CARE EDUCATION/TRAINING PROGRAM

## 2024-06-19 PROCEDURE — 73110 X-RAY EXAM OF WRIST: CPT

## 2024-06-19 PROCEDURE — 96365 THER/PROPH/DIAG IV INF INIT: CPT

## 2024-06-19 PROCEDURE — 36415 COLL VENOUS BLD VENIPUNCTURE: CPT

## 2024-06-19 PROCEDURE — 73200 CT UPPER EXTREMITY W/O DYE: CPT

## 2024-06-19 PROCEDURE — 99284 EMERGENCY DEPT VISIT MOD MDM: CPT

## 2024-06-19 PROCEDURE — 99214 OFFICE O/P EST MOD 30 MIN: CPT | Performed by: STUDENT IN AN ORGANIZED HEALTH CARE EDUCATION/TRAINING PROGRAM

## 2024-06-19 PROCEDURE — 90471 IMMUNIZATION ADMIN: CPT

## 2024-06-19 RX ORDER — ACETAMINOPHEN 325 MG/1
650 TABLET ORAL ONCE
Status: COMPLETED | OUTPATIENT
Start: 2024-06-19 | End: 2024-06-19

## 2024-06-19 RX ORDER — OXYCODONE HYDROCHLORIDE 5 MG/1
5 TABLET ORAL EVERY 6 HOURS PRN
Qty: 20 TABLET | Refills: 0 | Status: ON HOLD | OUTPATIENT
Start: 2024-06-19 | End: 2024-06-26

## 2024-06-19 RX ORDER — IBUPROFEN 600 MG/1
600 TABLET ORAL ONCE
Status: COMPLETED | OUTPATIENT
Start: 2024-06-19 | End: 2024-06-19

## 2024-06-19 RX ORDER — GINSENG 100 MG
1 CAPSULE ORAL ONCE
Status: COMPLETED | OUTPATIENT
Start: 2024-06-19 | End: 2024-06-19

## 2024-06-19 RX ORDER — CEFAZOLIN SODIUM 2 G/50ML
2000 SOLUTION INTRAVENOUS ONCE
Status: COMPLETED | OUTPATIENT
Start: 2024-06-19 | End: 2024-06-19

## 2024-06-19 RX ORDER — OXYCODONE HYDROCHLORIDE 5 MG/1
5 TABLET ORAL ONCE
Status: COMPLETED | OUTPATIENT
Start: 2024-06-19 | End: 2024-06-19

## 2024-06-19 RX ORDER — LIDOCAINE HYDROCHLORIDE 20 MG/ML
10 INJECTION, SOLUTION EPIDURAL; INFILTRATION; INTRACAUDAL; PERINEURAL ONCE
Status: COMPLETED | OUTPATIENT
Start: 2024-06-19 | End: 2024-06-19

## 2024-06-19 RX ADMIN — ACETAMINOPHEN 650 MG: 325 TABLET, FILM COATED ORAL at 15:54

## 2024-06-19 RX ADMIN — TETANUS TOXOID, REDUCED DIPHTHERIA TOXOID AND ACELLULAR PERTUSSIS VACCINE, ADSORBED 0.5 ML: 5; 2.5; 8; 8; 2.5 SUSPENSION INTRAMUSCULAR at 17:33

## 2024-06-19 RX ADMIN — CEFAZOLIN SODIUM 2000 MG: 2 SOLUTION INTRAVENOUS at 16:12

## 2024-06-19 RX ADMIN — IBUPROFEN 600 MG: 600 TABLET, FILM COATED ORAL at 15:55

## 2024-06-19 RX ADMIN — BACITRACIN 1 SMALL APPLICATION: 500 OINTMENT TOPICAL at 15:57

## 2024-06-19 RX ADMIN — OXYCODONE HYDROCHLORIDE 5 MG: 5 TABLET ORAL at 15:55

## 2024-06-19 RX ADMIN — LIDOCAINE HYDROCHLORIDE 10 ML: 20 INJECTION, SOLUTION EPIDURAL; INFILTRATION; INTRACAUDAL at 15:57

## 2024-06-19 NOTE — ED PROVIDER NOTES
"History  Chief Complaint   Patient presents with    Wrist Injury     Pt fell and caught herself w/ L hand. States she broke the L wrist before. Bruising and swelling noted in the wrist. Slight deformity. Pulses intact.     Yumi is a 78 year old female presenting with a left wrist injury after mechanical fall from standing.  Patient states she tripped on the curb and fell backwards, she attempted to catch herself with her left hand resulting in a injury to her left wrist.  There is an obvious deformity, significant tenderness at the site.  She is able to range all of her digits and denies any numbness distally.  She had a prior fracture in the same wrist.  Did not take any medication for discomfort prior to arrival.  Patient has history of atrial flutter, also prior history of \"blood clots\", she takes Eliquis daily.      History provided by:  Patient and medical records   used: No        Prior to Admission Medications   Prescriptions Last Dose Informant Patient Reported? Taking?   Calcium Carbonate-Vitamin D (CALCIUM 600+D PO)  Self Yes No   Sig: Take by mouth   Elastic Bandages & Supports (MEDICAL COMPRESSION STOCKINGS) MISC  Self No No   Sig: by Does not apply route daily Knee High 20-30mmHg   Multiple Vitamins-Minerals (MULTIVITAMIN ADULT PO)  Self Yes No   Sig: Take 1 tablet by mouth   acetaminophen (TYLENOL) 500 mg tablet  Self Yes No   Sig: Take 500 mg by mouth every 6 (six) hours   acetaminophen-codeine (TYLENOL with CODEINE #3) 300-30 MG per tablet  Self Yes No   Sig: take 1 tablet by mouth every 6 hours if needed for MILD TO MODERATE PAIN   Patient not taking: Reported on 5/6/2024   amLODIPine (NORVASC) 5 mg tablet  Self Yes No   Sig: Take 5 mg by mouth daily   apixaban (Eliquis) 5 mg  Self No No   Sig: Take 1 tablet (5 mg total) by mouth 2 (two) times a day   aspirin 81 MG tablet  Self Yes No   Sig: Take 81 mg by mouth 2 (two) times a day   atorvastatin (LIPITOR) 10 mg tablet  Self Yes " No   Sig: Take 10 mg by mouth daily   fexofenadine (ALLEGRA) 180 MG tablet  Self Yes No   Sig: Take 180 mg by mouth   Patient not taking: Reported on 2024   gabapentin (NEURONTIN) 100 mg capsule  Self Yes No   Sig: take 1 capsule by mouth nightly   Patient not taking: Reported on 3/25/2024   guaiFENesin (MUCINEX) 600 mg 12 hr tablet  Self Yes No   Sig: Take 600 mg by mouth   methocarbamol (ROBAXIN) 500 mg tablet  Self Yes No   Sig: Take 500 mg by mouth 4 (four) times a day as needed   Patient not taking: Reported on 2024   metoprolol succinate (TOPROL-XL) 25 mg 24 hr tablet  Self No No   Sig: Take 1 tablet (25 mg total) by mouth daily   mometasone (NASONEX) 50 mcg/act nasal spray  Self Yes No   Si sprays daily   mupirocin (BACTROBAN) 2 % ointment  Self Yes No   Sig: apply A SMALL AMOUNT TO BOTH NOSTRILS twice a day for 5 days PRIOR TO SURGERY   Patient not taking: Reported on 3/25/2024   omeprazole (PriLOSEC) 20 mg delayed release capsule  Self Yes No   Sig: Take 20 mg by mouth 2 (two) times a day   oxyCODONE (ROXICODONE) 5 immediate release tablet  Self Yes No   Sig: take 1 tablet by mouth every 6 hours if needed for MODERATE TO SEVERE PAIN   Patient not taking: Reported on 3/25/2024      Facility-Administered Medications Last Administration Doses Remaining   bupivacaine (MARCAINE) 0.25 % injection 0.5 mL 2022  3:59 PM           Past Medical History:   Diagnosis Date    Atrial flutter (HCC)        Past Surgical History:   Procedure Laterality Date    BACK SURGERY      BREAST SURGERY      FOOT SURGERY      HIP SURGERY Left     KNEE SURGERY Left        Family History   Problem Relation Age of Onset    Heart disease Mother     Cancer Mother     Varicose Veins Mother     Heart disease Father     Heart disease Sister     Cancer Sister     Cancer Brother     Varicose Veins Maternal Grandmother      I have reviewed and agree with the history as documented.    E-Cigarette/Vaping    E-Cigarette Use Never  User      E-Cigarette/Vaping Substances    Nicotine No     THC No     CBD No     Flavoring No     Other No     Unknown No      Social History     Tobacco Use    Smoking status: Never    Smokeless tobacco: Never   Vaping Use    Vaping status: Never Used   Substance Use Topics    Alcohol use: Yes     Comment: rarely    Drug use: No        Review of Systems   Constitutional:  Negative for chills and fever.   HENT:  Negative for ear pain and sore throat.    Eyes:  Negative for pain and visual disturbance.   Respiratory:  Negative for cough and shortness of breath.    Cardiovascular:  Negative for chest pain and palpitations.   Gastrointestinal:  Negative for abdominal pain and vomiting.   Genitourinary:  Negative for dysuria and hematuria.   Musculoskeletal:  Positive for arthralgias. Negative for back pain.        Left wrist pain   Skin:  Negative for color change and rash.   Neurological:  Negative for seizures and syncope.   All other systems reviewed and are negative.      Physical Exam  ED Triage Vitals   Temperature Pulse Respirations Blood Pressure SpO2   06/19/24 1456 06/19/24 1456 06/19/24 1456 06/19/24 1456 06/19/24 1456   98 °F (36.7 °C) 82 16 146/87 95 %      Temp Source Heart Rate Source Patient Position - Orthostatic VS BP Location FiO2 (%)   06/19/24 1456 06/19/24 1456 -- -- --   Oral Monitor         Pain Score       06/19/24 1554       10 - Worst Possible Pain             Orthostatic Vital Signs  Vitals:    06/19/24 1456   BP: 146/87   Pulse: 82       Physical Exam  Vitals and nursing note reviewed.   Constitutional:       General: She is not in acute distress.  HENT:      Head: Normocephalic and atraumatic.      Mouth/Throat:      Mouth: Mucous membranes are moist.      Pharynx: Oropharynx is clear.   Eyes:      General: No scleral icterus.     Conjunctiva/sclera: Conjunctivae normal.   Cardiovascular:      Rate and Rhythm: Normal rate and regular rhythm.      Heart sounds: Normal heart sounds.    Pulmonary:      Effort: Pulmonary effort is normal. No respiratory distress.      Breath sounds: Normal breath sounds.   Abdominal:      General: Abdomen is flat. There is no distension.      Tenderness: There is no abdominal tenderness.   Musculoskeletal:         General: No signs of injury.      Right shoulder: Normal.      Left shoulder: Normal.      Right elbow: Normal.      Left elbow: Normal.      Right forearm: Normal.      Left forearm: Normal.      Right wrist: Normal.      Left wrist: Swelling, deformity, tenderness and bony tenderness present. Decreased range of motion. Normal pulse.      Right hand: Normal.      Left hand: Normal.      Cervical back: Neck supple. No rigidity.      Comments: Patient has obvious deformity of the left wrist with swelling and bruising, there appears to be a superficial skin tear on the left wrist, does not appear to be a significant laceration that is consistent with an open fracture, she has full range of motion of all of her digits, sensation is also intact distally, pulses intact.   Skin:     General: Skin is warm.      Coloration: Skin is not jaundiced.      Findings: No erythema or rash.   Neurological:      General: No focal deficit present.      Mental Status: She is alert. Mental status is at baseline.   Psychiatric:         Mood and Affect: Mood normal.         Behavior: Behavior normal.               ED Medications  Medications   bacitracin topical ointment 1 small application (1 small application Topical Given by Other 6/19/24 5657)   lidocaine (PF) (XYLOCAINE-MPF) 2 % injection 10 mL (10 mL Infiltration Given by Other 6/19/24 2817)   oxyCODONE (ROXICODONE) IR tablet 5 mg (5 mg Oral Given 6/19/24 1555)   acetaminophen (TYLENOL) tablet 650 mg (650 mg Oral Given 6/19/24 1554)   ibuprofen (MOTRIN) tablet 600 mg (600 mg Oral Given 6/19/24 1555)   ceFAZolin (ANCEF) IVPB (premix in dextrose) 2,000 mg 50 mL (0 mg Intravenous Stopped 6/19/24 8940)    tetanus-diphtheria-acellular pertussis (BOOSTRIX) IM injection 0.5 mL (0.5 mL Intramuscular Given 6/19/24 1733)       Diagnostic Studies  Results Reviewed       Procedure Component Value Units Date/Time    Comprehensive metabolic panel [[id number]] Collected: 06/19/24 1610    Lab Status: Final result Specimen: Blood from Arm, Right Updated: 06/19/24 1645     Sodium 141 mmol/L      Potassium 4.4 mmol/L      Chloride 105 mmol/L      CO2 29 mmol/L      ANION GAP 7 mmol/L      BUN 20 mg/dL      Creatinine 0.81 mg/dL      Glucose 99 mg/dL      Calcium 9.4 mg/dL      AST 17 U/L      ALT 15 U/L      Alkaline Phosphatase 77 U/L      Total Protein 6.9 g/dL      Albumin 4.1 g/dL      Total Bilirubin 0.29 mg/dL      eGFR 69 ml/min/1.73sq m     Narrative:      National Kid[employer] Disease Foundation guidelines for Chronic Kidney Disease (CKD):     Stage 1 with normal or high GFR (GFR > 90 mL/min/1.73 square meters)    Stage 2 Mild CKD (GFR = 60-89 mL/min/1.73 square meters)    Stage 3A Moderate CKD (GFR = 45-59 mL/min/1.73 square meters)    Stage 3B Moderate CKD (GFR = 30-44 mL/min/1.73 square meters)    Stage 4 Severe CKD (GFR = 15-29 mL/min/1.73 square meters)    Stage 5 End Stage CKD (GFR <15 mL/min/1.73 square meters)  Note: GFR calculation is accurate only with a steady state creatinine    CBC and differential [[id number]] Collected: 06/19/24 1610    Lab Status: Final result Specimen: Blood from Arm, Right Updated: 06/19/24 1622     WBC 8.69 Thousand/uL      RBC 4.94 Million/uL      Hemoglobin 14.2 g/dL      Hematocrit 44.8 %      MCV 91 fL      MCH 28.7 pg      MCHC 31.7 g/dL      RDW 13.6 %      MPV 10.1 fL      Platelets 329 Thousands/uL      nRBC 0 /100 WBCs      Segmented % 66 %      Immature Grans % 0 %      Lymphocytes % 23 %      Monocytes % 9 %      Eosinophils Relative 1 %      Basophils Relative 1 %      Absolute Neutrophils 5.69 Thousands/µL      Absolute Immature Grans 0.02 Thousand/uL      Absolute Lymphocytes  2.02 Thousands/µL      Absolute Monocytes 0.79 Thousand/µL      Eosinophils Absolute 0.11 Thousand/µL      Basophils Absolute 0.06 Thousands/µL                    XR wrist 3+ views LEFT   ED Interpretation by Kristopher Connolly DO (06/19 1540)   Distally displaced/angulated radius fracture and distal ulnar dislocation      CT upper extremity wo contrast left    (Results Pending)         Procedures  ECG 12 Lead Documentation Only    Date/Time: 6/19/2024 5:53 PM    Performed by: Kristopher Connolly DO  Authorized by: Kristopher Connolly DO    Indications / Diagnosis:  Preop planning  ECG reviewed by me, the ED Provider: yes    Patient location:  ED  Previous ECG:     Previous ECG:  Compared to current    Comparison ECG info:  Normal sinus rhythm today    Similarity:  Changes noted  Interpretation:     Interpretation: normal    Rate:     ECG rate:  72    ECG rate assessment: normal    Rhythm:     Rhythm: sinus rhythm    Ectopy:     Ectopy: none    QRS:     QRS axis:  Normal    QRS intervals:  Normal  Conduction:     Conduction: normal    ST segments:     ST segments:  Normal  T waves:     T waves: normal          ED Course  ED Course as of 06/19/24 1848   Wed Jun 19, 2024   1638 On-call hand surgery team just evaluated the patient at the bedside, awaiting further recommendations   1800 Patient was splinted by hand surgery, asked that we get preop labs as they will be taking her to the OR in several days, also asked that we obtain a CT scan for operation planning, patient then will be able to be discharged home                                       Medical Decision Making  Yumi is a 78 year old female presenting with a left wrist injury after mechanical fall from standing.  Patient tripped and fell backwards onto her outstretched left hand, she notes deformity at the site, prior fracture of the left wrist.    On exam, patient had an obvious deformity of the left wrist, there appeared to be a skin tear  at the site of the deformity on the ulnar side.  No active bleeding.  Neurovascularly intact distally.    X-ray of the left wrist confirmed displaced radius fracture with ulnar displacement/dislocation.  Given the skin tear in the region, did treat prophylactically for open fracture with cefazolin.  Patient also given a tetanus shot.    I contacted the hand surgeon was on-call, Dr. Smith, who evaluated patient at the bedside.  Patient's pain was well-controlled here in the ED, prior to their evaluation did offer reduction by hematoma block and pain control versus procedural sedation, patient agreed to the former and did not want sedation.  When hand surgery evaluated the patient, they did not believe the fracture was a true open fracture and that the wound on her wrist was a superficial skin tear and that she does not require further antibiotics.  They applied a splint at the site, plan to take her to the OR next week, prior to discharge asked that we obtain an ECG as well as preoperative labs and CT scan for preoperative planning.    Patient stable for discharge with follow-up with hand surgery.    Problems Addressed:  Closed fracture of distal end of left radius, unspecified fracture morphology, initial encounter: acute illness or injury  Fall, initial encounter: acute illness or injury  Tear of skin of left wrist, initial encounter: acute illness or injury    Amount and/or Complexity of Data Reviewed  Labs: ordered.  Radiology: ordered and independent interpretation performed.    Risk  OTC drugs.  Prescription drug management.          Disposition  Final diagnoses:   Closed fracture of distal end of left radius, unspecified fracture morphology, initial encounter - Closed fracture with skin tear of left wrist   Tear of skin of left wrist, initial encounter   Fall, initial encounter   Distal radius fracture, left     Time reflects when diagnosis was documented in both MDM as applicable and the Disposition  within this note       Time User Action Codes Description Comment    6/19/2024  4:14 PM Kristopher Connolly Add [A41.9] Sepsis (HCC)     6/19/2024  4:15 PM Kristopher Connolly Remove [A41.9] Sepsis (HCC)     6/19/2024  4:41 PM Kristopher Connolly Add [S52.502B] Type I or II open fracture of distal end of left radius, unspecified fracture morphology, initial encounter     6/19/2024  5:25 PM Kristopher Connolly Modify [S52.502B] Closed fracture of distal end of left radius, unspecified fracture morphology, initial encounter     6/19/2024  5:27 PM Kristopher Connolly Add [S52.502A] Closed fracture of distal end of left radius, unspecified fracture morphology, initial encounter     6/19/2024  5:28 PM Kristopher Connolly Remove [S52.502A] Closed fracture of distal end of left radius, unspecified fracture morphology, initial encounter     6/19/2024  5:28 PM Kristopher Connolly Add [S61.512A] Tear of skin of left wrist, initial encounter     6/19/2024  5:33 PM Kristopher Connolly Add [W19.XXXA] Fall, initial encounter     6/19/2024  5:53 PM Kristopher Connolly Modify [S52.502B] Closed fracture of distal end of left radius, unspecified fracture morphology, initial encounter Closed fracture with skin tear of left wrist    6/19/2024  6:13 PM Татьяна Camacho Add [S52.502A] Distal radius fracture, left           ED Disposition       ED Disposition   Discharge    Condition   Stable    Date/Time   Wed Jun 19, 2024  5:28 PM    Comment   Stormy Hermosillo discharge to home/self care.                   Follow-up Information       Follow up With Specialties Details Why Contact Info    Rick Smith MD Orthopedic Surgery, Hand Surgery Go to   [address] [facility]  [address] [place] [address]  [phone]              Patient's Medications   Discharge Prescriptions    OXYCODONE (ROXICODONE) 5 IMMEDIATE RELEASE TABLET    Take 1 tablet (5 mg total) by mouth every 6 (six) hours as needed for moderate pain or severe pain for up to 20  doses Max Daily Amount: 20 mg       Start Date: 6/19/2024 End Date: --       Order Dose: 5 mg       Quantity: 20 tablet    Refills: 0     No discharge procedures on file.    PDMP Review         Value Time User    PDMP Reviewed  Yes 6/19/2024  6:10 PM Татьяна Camacho MD             ED Provider  Attending physically available and evaluated Stormy A Jaimee. I managed the patient along with the ED Attending.    Electronically Signed by           Kristopher Connolly DO  06/19/24 0877

## 2024-06-19 NOTE — DISCHARGE INSTRUCTIONS
Schedule a follow-up appointment with the hand surgeon, take Tylenol or Motrin as needed for discomfort, return to ED for any new or worsening symptoms.

## 2024-06-19 NOTE — CONSULTS
Orthopedics   Stormy Hermosillo 78 y.o. female MRN: [id number]  Unit/Bed#: ED-21      Chief Complaint:   left wrist pain    HPI:   78 y.o. right hand dominant female s/p fall backwards onto outstretched left hand complaining of left wrist pain. Patient reports immediate onset of left wrist pain and deformity. Denies head strike or LOC.  Pain is sharp in character in left wrist, however notes pain is currently well controlled after getting pain medication. Endorses mild numbness to finger tips. Denies any other injury or area of pain/discomfort. Small open abrasion noted ulnar aspect of dorsal wrist, no bony protrusion. Patient recently underwent right total knee arthroplasty on 3/12/2024 with subsequent PE, she is currently on eliquis. Does report previous left distal radius fracture in 2019 for which was treated non-operatively. No other complaints at this time. Patient enjoys gardening and wishes to be able to resume her usual activities as soon as she can.    Patient endorses an active lifestyle and likes to walk, garden, take care of dogs, and play with grandchildren.      Review Of Systems:   Skin: see physical exam  Neuro: See HPI  Musculoskeletal: See HPI  14 point review of systems negative except as stated above     Past Medical History:   Past Medical History:   Diagnosis Date    Atrial flutter (HCC)        Past Surgical History:   Past Surgical History:   Procedure Laterality Date    BACK SURGERY      BREAST SURGERY      FOOT SURGERY      HIP SURGERY Left     KNEE SURGERY Left        Family History:  Family history reviewed and non-contributory  Family History   Problem Relation Age of Onset    Heart disease Mother     Cancer Mother     Varicose Veins Mother     Heart disease Father     Heart disease Sister     Cancer Sister     Cancer Brother     Varicose Veins Maternal Grandmother        Social History:  Social History     Socioeconomic History    Marital status: /Civil Union     Spouse name:  None    Number of children: None    Years of education: None    Highest education level: None   Occupational History    None   Tobacco Use    Smoking status: Never    Smokeless tobacco: Never   Vaping Use    Vaping status: Never Used   Substance and Sexual Activity    Alcohol use: Yes     Comment: rarely    Drug use: No    Sexual activity: None   Other Topics Concern    None   Social History Narrative    None     Social Determinants of Health     Financial Resource Strain: Low Risk  (3/12/2024)    [facility]    Overall Financial Resource Strain (CARDIA)     Difficulty of Paying Living Expenses: Not hard at all   Food Insecurity: No Food Insecurity (3/19/2024)    Hunger Vital Sign     Worried About Running Out of Food in the Last Year: Never true     Ran Out of Food in the Last Year: Never true   Transportation Needs: No Transportation Needs (3/19/2024)    PRAPARE - Transportation     Lack of Transportation (Medical): No     Lack of Transportation (Non-Medical): No   Physical Activity: Not on file   Stress: Not on file   Social Connections: Unknown (6/18/2024)    Received from [employer]    Social [employer]     How often do you feel lonely or isolated from those around you? (Adult - for ages 18 years and over): Not on file   Intimate Partner Violence: Not At Risk (3/12/2024)    [facility]    Humiliation, Afraid, Rape, and Kick questionnaire     Fear of Current or Ex-Partner: No     Emotionally Abused: No     Physically Abused: No     Sexually Abused: No   Housing Stability: Unknown (3/19/2024)    Housing Stability Vital Sign     Unable to Pay for Housing in the Last Year: No     Number of Times Moved in the Last Year: Not on file     Homeless in the Last Year: No       Allergies:   Allergies   Allergen Reactions    Tramadol GI Intolerance         Labs:  0   Lab Value Date/Time    HCT 44.8  06/19/2024 1610    HCT 33.1 (L) 03/20/2024 0503    HCT 33.5 (L) 03/19/2024 0430    HCT 33.9 (L) 03/13/2024 0419    HGB 14.2 06/19/2024 1610    HGB 10.5 (L) 03/20/2024 0503    HGB 10.6 (L) 03/19/2024 0430    HGB 11.6 03/13/2024 0419    INR 0.97 03/18/2024 1047    WBC 8.69 06/19/2024 1610    WBC 10.89 (H) 03/20/2024 0503    WBC 9.68 03/19/2024 0430    ESR 7 03/14/2018 0822       Meds:    Current Facility-Administered Medications:     bupivacaine (MARCAINE) 0.25 % injection 0.5 mL, 0.5 mL, Injection, , Bharath Uribe, DPM, 0.5 mL at 12/14/22 1559    tetanus-diphtheria-acellular pertussis (BOOSTRIX) IM injection 0.5 mL, 0.5 mL, Intramuscular, Once, Kristopher Connolly, DO    Current Outpatient Medications:     acetaminophen (TYLENOL) 500 mg tablet, Take 500 mg by mouth every 6 (six) hours, Disp: , Rfl:     acetaminophen-codeine (TYLENOL with CODEINE #3) 300-30 MG per tablet, take 1 tablet by mouth every 6 hours if needed for MILD TO MODERATE PAIN (Patient not taking: Reported on 5/6/2024), Disp: , Rfl:     amLODIPine (NORVASC) 5 mg tablet, Take 5 mg by mouth daily, Disp: , Rfl:     apixaban (Eliquis) 5 mg, Take 1 tablet (5 mg total) by mouth 2 (two) times a day, Disp: 180 tablet, Rfl: 0    aspirin 81 MG tablet, Take 81 mg by mouth 2 (two) times a day, Disp: , Rfl:     atorvastatin (LIPITOR) 10 mg tablet, Take 10 mg by mouth daily, Disp: , Rfl: 0    Calcium Carbonate-Vitamin D (CALCIUM 600+D PO), Take by mouth, Disp: , Rfl:     Elastic Bandages & Supports (MEDICAL COMPRESSION STOCKINGS) MISC, by Does not apply route daily Knee High 20-30mmHg, Disp: 2 each, Rfl: 4    fexofenadine (ALLEGRA) 180 MG tablet, Take 180 mg by mouth (Patient not taking: Reported on 5/6/2024), Disp: , Rfl:     gabapentin (NEURONTIN) 100 mg capsule, take 1 capsule by mouth nightly (Patient not taking: Reported on 3/25/2024), Disp: , Rfl:     guaiFENesin (MUCINEX) 600 mg 12 hr tablet, Take 600 mg by mouth, Disp: , Rfl:     methocarbamol  "(ROBAXIN) 500 mg tablet, Take 500 mg by mouth 4 (four) times a day as needed (Patient not taking: Reported on 5/6/2024), Disp: , Rfl:     metoprolol succinate (TOPROL-XL) 25 mg 24 hr tablet, Take 1 tablet (25 mg total) by mouth daily, Disp: 30 tablet, Rfl: 0    mometasone (NASONEX) 50 mcg/act nasal spray, 2 sprays daily, Disp: , Rfl:     Multiple Vitamins-Minerals (MULTIVITAMIN ADULT PO), Take 1 tablet by mouth, Disp: , Rfl:     mupirocin (BACTROBAN) 2 % ointment, apply A SMALL AMOUNT TO BOTH NOSTRILS twice a day for 5 days PRIOR TO SURGERY (Patient not taking: Reported on 3/25/2024), Disp: , Rfl:     omeprazole (PriLOSEC) 20 mg delayed release capsule, Take 20 mg by mouth 2 (two) times a day, Disp: , Rfl:     oxyCODONE (ROXICODONE) 5 immediate release tablet, take 1 tablet by mouth every 6 hours if needed for MODERATE TO SEVERE PAIN (Patient not taking: Reported on 3/25/2024), Disp: , Rfl:     Blood Culture:   No results found for: \"BLOODCX\"    Wound Culture:   No results found for: \"WOUNDCULT\"    Ins and Outs:  No intake/output data recorded.        Physical Exam:   /87   Pulse 82   Temp 98 °F (36.7 °C) (Oral)   Resp 16   SpO2 95%   Gen: No acute distress, resting comfortably in bed  HEENT: Eyes clear, moist mucus membranes, hearing intact  Respiratory: No audible wheezing or stridor  Cardiovascular: Well Perfused peripherally, 2+ distal pulse  Abdomen: nondistended, no peritoneal signs  Musculoskeletal: left upper extremity  Small 1 cm x 1 cm superficial abrasion noted dorsal ulnar aspect of wrist. No deeper structures noted. Skin otherwise intact.  Ecchymosis noted over wrist  Obvious left wrist deformity  Moderate edema to affected area   Tender to palpation over distal left forearm  Sensation intact to median, radial, and ulnar distributions  Motor intact to ain/pin/m/r/u. Able to actively wiggle fingers. APB firing. 2 point discrimination grossly intact to radial and ulnar aspect of all " digits  Finger tips warm and well perfused, 2+ radial and ulnar pulse      Tertiary: all other noninjured extremities were palpated and ranged looking for secondary injuries. Patient had no pain with range of motion of her other major joints. No tenderness over all other joints/long bones as except already stated.    Radiology:   I personally reviewed the films.  X-rays AP/Lateral  left wrist shows left intra-articular distal radius fracture with significant shortening and loss of radial height.      Procedure: left sugar tong splint    Pt was placed in a well padded sugar tong splint with 3 inch webril, 3 inch fiberglass, and 3 inch ACE wrap which spanned from the distal palmar crease ending dorsally at the metacarpal heads. Also placed xeroform over small abrasion on dorsal ulnar aspect wrist. Pt tolerated the procedure well and was neurovascularly intact both pre and post procedure.       Assessment:  78 y.o.female S/P fall with left distal radius fracture    Plan:   Non weight bearing left upper extremity in sugartong splint  Maintain splint  Ice and elevation as needed to minimize swelling  Will plan to obtain CT scan left wrist prior to d/c from ED for surgical planning  Had lengthy discussion with patient at bedside regarding operative versus non-operative intervention  Plan for OR next week for ORIF left distal radius and possible left endoscopic versus open carpal tunnel release  Will have patient follow up on out-patient basis  Informed consent obtained at bedside  Instructed to return to ED if new numbness or tingling in fingers, pain that is out of control despite oral pain meds, or if fingers turn pale and cold  Analgesics for pain per primary team    Discussions:  Distal Radius Fracture Fixation: Both operative and non operative management of the injury was explained to the patient. With non-displaced or minimally displaced fractures, conservative treatment such as casting or splinting often results in  a functional recovery.  Typically, these fractures are immobilized in either a cast or splint depending on the pattern.  Radiographs are typically taken at intervals throughout the fracture healing to ensure that reduction or alignment is not lost.  If the fracture loses its alignment, surgical intervention may be required to stabilize it.  Medical conditions such as diabetes, osteoporosis, vitamin D deficiency, and a history of or exposure to smoking may delay or prevent fracture healing. Options between cast/splint immobilization and surgical treatment were offered and the risks and benefits of both were discussed. The decision was made to undergo surgical fixation. The surgical procedure was explained with a verbal understanding expressed by the patient. Postoperatively, the patient is to begin with occupational therapy to have a removable splint applied. This splint may be removed for showering, bathing, dining, sedative activities (eg watching tv, reading etc.) and daily therapy exercises. Radiographs are typically taken at intervals throughout the fracture healing ensure maintenance of reduction and alignment.  If the fracture loses its alignment, revision surgery may be required.  Medical conditions such as diabetes, osteoporosis, vitamin D deficiency, and a history of or exposure to smoking may delay or prevent fracture healing.  The risks and benefits of the procedure were explained to the patient, which include, but are not limited to: Bleeding, infection, recurrence, pain, scar, malunion, nonunion, damage to tendons, damage to nerves, and damage to blood vessels, and complications related to anesthesia, failure to give desire result, need for more surgery.  These risks, along with alternative conservative treatment options, and postoperative protocols were voiced back and understood by the patient.  All questions were answered to the patient's satisfaction.  The patient agrees to comply with a standard  postoperative protocol, and is willing to proceed.  Education was provided via written and auditory forms.  There were no barriers to learning.  Written handouts regarding wound care, incision and scar care, and general preoperative information was provided to the patient.  Prior to surgery, the patient may be requested to stop all anti-inflammatory medications.  Prophylactic aspirin, Plavix, and Coumadin may be allowed to be continued.  Medications including vitamin E., ginkgo, and fish oil are requested to be stopped approximately one week prior to surgery.  Hypertensive medications and beta blockers, if taken, should be continued.

## 2024-06-19 NOTE — ED ATTENDING ATTESTATION
6/19/2024  I, Татьяна Camacho MD, saw and evaluated the patient. I have discussed the patient with the resident/non-physician practitioner and agree with the resident's/non-physician practitioner's findings, Plan of Care, and MDM as documented in the resident's/non-physician practitioner's note, except where noted. All available labs and Radiology studies were reviewed.  I was present for key portions of any procedure(s) performed by the resident/non-physician practitioner and I was immediately available to provide assistance.       At this point I agree with the current assessment done in the Emergency Department.  I have conducted an independent evaluation of this patient a history and physical is as follows:    Patient is a 78-year-old female presents to the emergency department for evaluation with left wrist pain and deformity following mechanical fall.  She placed the arm out behind her with results and discomfort.  Denies any other injury/having hit her head.  History of injury to the same wrist in 2019.  No paresthesias or appreciated weakness in the hand today.  She is on Eliquis and has appreciated ecchymosis around the area of deformity.    On exam patient is well-appearing.  There is no tenderness of the left shoulder, upper arm, elbow, proximal or mid forearm.  Notable deformity appreciated over the wrist with overlying ecchymosis.  Skin tear with dried blood appreciated near bony prominence of fracture.  Separate abrasion with dried blood appreciated slightly proximally over the ulnar aspect of the forearm.    Able to range fingers well.  Rings on left finger appear tight.  +2 left radial and ulnar pulses.    Clinically with fracture.  X-rays reviewed by  and myself confirm this.  Marked dislocation appreciated at fracture site of radius.  Reduction will be necessary.  Options provided to patient by  and she prefers trying hematoma block, oral analgesic and  reduction.  Do have concern that area of skin disruption with dried blood may represent communication to fracture site/open fracture.  Will cover with antibiotic.    ED Course  ED Course as of 06/19/24 1816   Wed Jun 19, 2024   1609 Patient was able to remove rings from left ring finger.   1619 Dr. Connolly contacted on-call orthopedic provider who directed him to speak with hand specialist.  Dr. Smith will come to evaluate.     Dr. Smith reduced fracture at bedside and applied splint.  Have spoken with patient regarding surgical management next week.  CT scan will be obtained prior to discharge to help coordinate this.      Critical Care Time  Procedures

## 2024-06-19 NOTE — H&P (VIEW-ONLY)
Orthopedics   Stormy Hermosillo 78 y.o. female MRN: [id number]  Unit/Bed#: ED-21      Chief Complaint:   left wrist pain    HPI:   78 y.o. right hand dominant female s/p fall backwards onto outstretched left hand complaining of left wrist pain. Patient reports immediate onset of left wrist pain and deformity. Denies head strike or LOC.  Pain is sharp in character in left wrist, however notes pain is currently well controlled after getting pain medication. Endorses mild numbness to finger tips. Denies any other injury or area of pain/discomfort. Small open abrasion noted ulnar aspect of dorsal wrist, no bony protrusion. Patient recently underwent right total knee arthroplasty on 3/12/2024 with subsequent PE, she is currently on eliquis. Does report previous left distal radius fracture in 2019 for which was treated non-operatively. No other complaints at this time. Patient enjoys gardening and wishes to be able to resume her usual activities as soon as she can.    Patient endorses an active lifestyle and likes to walk, garden, take care of dogs, and play with grandchildren.      Review Of Systems:   Skin: see physical exam  Neuro: See HPI  Musculoskeletal: See HPI  14 point review of systems negative except as stated above     Past Medical History:   Past Medical History:   Diagnosis Date    Atrial flutter (HCC)        Past Surgical History:   Past Surgical History:   Procedure Laterality Date    BACK SURGERY      BREAST SURGERY      FOOT SURGERY      HIP SURGERY Left     KNEE SURGERY Left        Family History:  Family history reviewed and non-contributory  Family History   Problem Relation Age of Onset    Heart disease Mother     Cancer Mother     Varicose Veins Mother     Heart disease Father     Heart disease Sister     Cancer Sister     Cancer Brother     Varicose Veins Maternal Grandmother        Social History:  Social History     Socioeconomic History    Marital status: /Civil Union     Spouse name:  None    Number of children: None    Years of education: None    Highest education level: None   Occupational History    None   Tobacco Use    Smoking status: Never    Smokeless tobacco: Never   Vaping Use    Vaping status: Never Used   Substance and Sexual Activity    Alcohol use: Yes     Comment: rarely    Drug use: No    Sexual activity: None   Other Topics Concern    None   Social History Narrative    None     Social Determinants of Health     Financial Resource Strain: Low Risk  (3/12/2024)    [facility]    Overall Financial Resource Strain (CARDIA)     Difficulty of Paying Living Expenses: Not hard at all   Food Insecurity: No Food Insecurity (3/19/2024)    Hunger Vital Sign     Worried About Running Out of Food in the Last Year: Never true     Ran Out of Food in the Last Year: Never true   Transportation Needs: No Transportation Needs (3/19/2024)    PRAPARE - Transportation     Lack of Transportation (Medical): No     Lack of Transportation (Non-Medical): No   Physical Activity: Not on file   Stress: Not on file   Social Connections: Unknown (6/18/2024)    [facility] from [facility]    Social CurrencyBird     How often do you feel lonely or isolated from those around you? (Adult - for ages 18 years and over): Not on file   Intimate Partner Violence: Not At Risk (3/12/2024)    [facility]    Humiliation, Afraid, Rape, and Kick questionnaire     Fear of Current or Ex-Partner: No     Emotionally Abused: No     Physically Abused: No     Sexually Abused: No   Housing Stability: Unknown (3/19/2024)    Housing Stability Vital Sign     Unable to Pay for Housing in the Last Year: No     Number of Times Moved in the Last Year: Not on file     Homeless in the Last Year: No       Allergies:   Allergies   Allergen Reactions    Tramadol GI Intolerance         Labs:  0   Lab Value Date/Time    HCT 44.8  06/19/2024 1610    HCT 33.1 (L) 03/20/2024 0503    HCT 33.5 (L) 03/19/2024 0430    HCT 33.9 (L) 03/13/2024 0419    HGB 14.2 06/19/2024 1610    HGB 10.5 (L) 03/20/2024 0503    HGB 10.6 (L) 03/19/2024 0430    HGB 11.6 03/13/2024 0419    INR 0.97 03/18/2024 1047    WBC 8.69 06/19/2024 1610    WBC 10.89 (H) 03/20/2024 0503    WBC 9.68 03/19/2024 0430    ESR 7 03/14/2018 0822       Meds:    Current Facility-Administered Medications:     bupivacaine (MARCAINE) 0.25 % injection 0.5 mL, 0.5 mL, Injection, , Bharath Uribe, DPM, 0.5 mL at 12/14/22 1559    tetanus-diphtheria-acellular pertussis (BOOSTRIX) IM injection 0.5 mL, 0.5 mL, Intramuscular, Once, Kristopher Connolly, DO    Current Outpatient Medications:     acetaminophen (TYLENOL) 500 mg tablet, Take 500 mg by mouth every 6 (six) hours, Disp: , Rfl:     acetaminophen-codeine (TYLENOL with CODEINE #3) 300-30 MG per tablet, take 1 tablet by mouth every 6 hours if needed for MILD TO MODERATE PAIN (Patient not taking: Reported on 5/6/2024), Disp: , Rfl:     amLODIPine (NORVASC) 5 mg tablet, Take 5 mg by mouth daily, Disp: , Rfl:     apixaban (Eliquis) 5 mg, Take 1 tablet (5 mg total) by mouth 2 (two) times a day, Disp: 180 tablet, Rfl: 0    aspirin 81 MG tablet, Take 81 mg by mouth 2 (two) times a day, Disp: , Rfl:     atorvastatin (LIPITOR) 10 mg tablet, Take 10 mg by mouth daily, Disp: , Rfl: 0    Calcium Carbonate-Vitamin D (CALCIUM 600+D PO), Take by mouth, Disp: , Rfl:     Elastic Bandages & Supports (MEDICAL COMPRESSION STOCKINGS) MISC, by Does not apply route daily Knee High 20-30mmHg, Disp: 2 each, Rfl: 4    fexofenadine (ALLEGRA) 180 MG tablet, Take 180 mg by mouth (Patient not taking: Reported on 5/6/2024), Disp: , Rfl:     gabapentin (NEURONTIN) 100 mg capsule, take 1 capsule by mouth nightly (Patient not taking: Reported on 3/25/2024), Disp: , Rfl:     guaiFENesin (MUCINEX) 600 mg 12 hr tablet, Take 600 mg by mouth, Disp: , Rfl:     methocarbamol  "(ROBAXIN) 500 mg tablet, Take 500 mg by mouth 4 (four) times a day as needed (Patient not taking: Reported on 5/6/2024), Disp: , Rfl:     metoprolol succinate (TOPROL-XL) 25 mg 24 hr tablet, Take 1 tablet (25 mg total) by mouth daily, Disp: 30 tablet, Rfl: 0    mometasone (NASONEX) 50 mcg/act nasal spray, 2 sprays daily, Disp: , Rfl:     Multiple Vitamins-Minerals (MULTIVITAMIN ADULT PO), Take 1 tablet by mouth, Disp: , Rfl:     mupirocin (BACTROBAN) 2 % ointment, apply A SMALL AMOUNT TO BOTH NOSTRILS twice a day for 5 days PRIOR TO SURGERY (Patient not taking: Reported on 3/25/2024), Disp: , Rfl:     omeprazole (PriLOSEC) 20 mg delayed release capsule, Take 20 mg by mouth 2 (two) times a day, Disp: , Rfl:     oxyCODONE (ROXICODONE) 5 immediate release tablet, take 1 tablet by mouth every 6 hours if needed for MODERATE TO SEVERE PAIN (Patient not taking: Reported on 3/25/2024), Disp: , Rfl:     Blood Culture:   No results found for: \"BLOODCX\"    Wound Culture:   No results found for: \"WOUNDCULT\"    Ins and Outs:  No intake/output data recorded.        Physical Exam:   /87   Pulse 82   Temp 98 °F (36.7 °C) (Oral)   Resp 16   SpO2 95%   Gen: No acute distress, resting comfortably in bed  HEENT: Eyes clear, moist mucus membranes, hearing intact  Respiratory: No audible wheezing or stridor  Cardiovascular: Well Perfused peripherally, 2+ distal pulse  Abdomen: nondistended, no peritoneal signs  Musculoskeletal: left upper extremity  Small 1 cm x 1 cm superficial abrasion noted dorsal ulnar aspect of wrist. No deeper structures noted. Skin otherwise intact.  Ecchymosis noted over wrist  Obvious left wrist deformity  Moderate edema to affected area   Tender to palpation over distal left forearm  Sensation intact to median, radial, and ulnar distributions  Motor intact to ain/pin/m/r/u. Able to actively wiggle fingers. APB firing. 2 point discrimination grossly intact to radial and ulnar aspect of all " digits  Finger tips warm and well perfused, 2+ radial and ulnar pulse      Tertiary: all other noninjured extremities were palpated and ranged looking for secondary injuries. Patient had no pain with range of motion of her other major joints. No tenderness over all other joints/long bones as except already stated.    Radiology:   I personally reviewed the films.  X-rays AP/Lateral  left wrist shows left intra-articular distal radius fracture with significant shortening and loss of radial height.      Procedure: left sugar tong splint    Pt was placed in a well padded sugar tong splint with 3 inch webril, 3 inch fiberglass, and 3 inch ACE wrap which spanned from the distal palmar crease ending dorsally at the metacarpal heads. Also placed xeroform over small abrasion on dorsal ulnar aspect wrist. Pt tolerated the procedure well and was neurovascularly intact both pre and post procedure.       Assessment:  78 y.o.female S/P fall with left distal radius fracture    Plan:   Non weight bearing left upper extremity in sugartong splint  Maintain splint  Ice and elevation as needed to minimize swelling  Will plan to obtain CT scan left wrist prior to d/c from ED for surgical planning  Had lengthy discussion with patient at bedside regarding operative versus non-operative intervention  Plan for OR next week for ORIF left distal radius and possible left endoscopic versus open carpal tunnel release  Will have patient follow up on out-patient basis  Informed consent obtained at bedside  Instructed to return to ED if new numbness or tingling in fingers, pain that is out of control despite oral pain meds, or if fingers turn pale and cold  Analgesics for pain per primary team    Discussions:  Distal Radius Fracture Fixation: Both operative and non operative management of the injury was explained to the patient. With non-displaced or minimally displaced fractures, conservative treatment such as casting or splinting often results in  a functional recovery.  Typically, these fractures are immobilized in either a cast or splint depending on the pattern.  Radiographs are typically taken at intervals throughout the fracture healing to ensure that reduction or alignment is not lost.  If the fracture loses its alignment, surgical intervention may be required to stabilize it.  Medical conditions such as diabetes, osteoporosis, vitamin D deficiency, and a history of or exposure to smoking may delay or prevent fracture healing. Options between cast/splint immobilization and surgical treatment were offered and the risks and benefits of both were discussed. The decision was made to undergo surgical fixation. The surgical procedure was explained with a verbal understanding expressed by the patient. Postoperatively, the patient is to begin with occupational therapy to have a removable splint applied. This splint may be removed for showering, bathing, dining, sedative activities (eg watching tv, reading etc.) and daily therapy exercises. Radiographs are typically taken at intervals throughout the fracture healing ensure maintenance of reduction and alignment.  If the fracture loses its alignment, revision surgery may be required.  Medical conditions such as diabetes, osteoporosis, vitamin D deficiency, and a history of or exposure to smoking may delay or prevent fracture healing.  The risks and benefits of the procedure were explained to the patient, which include, but are not limited to: Bleeding, infection, recurrence, pain, scar, malunion, nonunion, damage to tendons, damage to nerves, and damage to blood vessels, and complications related to anesthesia, failure to give desire result, need for more surgery.  These risks, along with alternative conservative treatment options, and postoperative protocols were voiced back and understood by the patient.  All questions were answered to the patient's satisfaction.  The patient agrees to comply with a standard  postoperative protocol, and is willing to proceed.  Education was provided via written and auditory forms.  There were no barriers to learning.  Written handouts regarding wound care, incision and scar care, and general preoperative information was provided to the patient.  Prior to surgery, the patient may be requested to stop all anti-inflammatory medications.  Prophylactic aspirin, Plavix, and Coumadin may be allowed to be continued.  Medications including vitamin E., ginkgo, and fish oil are requested to be stopped approximately one week prior to surgery.  Hypertensive medications and beta blockers, if taken, should be continued.

## 2024-06-20 ENCOUNTER — TELEPHONE (OUTPATIENT)
Dept: OBGYN CLINIC | Facility: CLINIC | Age: 79
End: 2024-06-20

## 2024-06-20 LAB
ATRIAL RATE: 72 BPM
P AXIS: 115 DEGREES
PR INTERVAL: 146 MS
QRS AXIS: 70 DEGREES
QRSD INTERVAL: 76 MS
QT INTERVAL: 394 MS
QTC INTERVAL: 431 MS
T WAVE AXIS: 126 DEGREES
VENTRICULAR RATE: 72 BPM

## 2024-06-20 PROCEDURE — 93010 ELECTROCARDIOGRAM REPORT: CPT | Performed by: INTERNAL MEDICINE

## 2024-06-21 ENCOUNTER — ANESTHESIA EVENT (OUTPATIENT)
Dept: ANESTHESIOLOGY | Facility: HOSPITAL | Age: 79
End: 2024-06-21

## 2024-06-21 ENCOUNTER — ANESTHESIA (OUTPATIENT)
Dept: ANESTHESIOLOGY | Facility: HOSPITAL | Age: 79
End: 2024-06-21

## 2024-06-24 ENCOUNTER — ANESTHESIA EVENT (OUTPATIENT)
Dept: PERIOP | Facility: AMBULARY SURGERY CENTER | Age: 79
End: 2024-06-24
Payer: COMMERCIAL

## 2024-06-24 NOTE — PRE-PROCEDURE INSTRUCTIONS
Pre-Surgery Instructions:   Medication Instructions    acetaminophen (TYLENOL) 500 mg tablet Uses PRN- OK to take day of surgery    apixaban (Eliquis) 5 mg Instructions provided by MD- msg to surgeon's office    aspirin 81 MG tablet Instructions provided by MD- msg to surgeon's office    atorvastatin (LIPITOR) 10 mg tablet Continue    BIOTIN PO Stop taking 1 day prior to surgery.    Calcium Carbonate-Vitamin D (CALCIUM 600+D PO) Stop taking 1 day prior to surgery.    guaiFENesin (MUCINEX) 600 mg 12 hr tablet Hold day of surgery.    metoprolol succinate (TOPROL-XL) 25 mg 24 hr tablet Take day of surgery.    mometasone (NASONEX) 50 mcg/act nasal spray Uses PRN, ok to continue    Multiple Vitamins-Minerals (MULTIVITAMIN ADULT PO) Stop taking 1 day prior to surgery.    omeprazole (PriLOSEC) 20 mg delayed release capsule Take day of surgery.    oxyCODONE (ROXICODONE) 5 immediate release tablet Uses PRN- OK to take day of surgery   Medication instructions for day surgery reviewed. Please use only a sip of water to take your instructed medications. Avoid all over the counter vitamins, supplements and NSAIDS for one week prior to surgery per anesthesia guidelines. Tylenol is ok to take as needed.     You will receive a call one business day prior to surgery with an arrival time and hospital directions. If your surgery is scheduled on a Monday, the hospital will be calling you on the Friday prior to your surgery. If you have not heard from anyone by 8pm, please call the hospital supervisor through the hospital  at [phone]. ([place] [phone] or [place] [phone]).    Do not eat or drink anything after midnight the night before your surgery, including candy, mints, lifesavers, or chewing gum. Do not drink alcohol 24hrs before your surgery. Try not to smoke at least 24hrs before your surgery.       Follow the pre surgery showering instructions as listed in the “My Surgical Experience Booklet” or otherwise  provided by your surgeon's office. Do not use a blade to shave the surgical area 1 week before surgery. It is okay to use a clean electric clippers up to 24 hours before surgery. Do not apply any lotions, creams, including makeup, cologne, deodorant, or perfumes after showering on the day of your surgery. Do not use dry shampoo, hair spray, hair gel, or any type of hair products.     No contact lenses, eye make-up, or artificial eyelashes. Remove nail polish, including gel polish, and any artificial, gel, or acrylic nails if possible. Remove all jewelry including rings and body piercing jewelry.     Wear causal clothing that is easy to take on and off. Consider your type of surgery.    Keep any valuables, jewelry, piercings at home. Please bring any specially ordered equipment (sling, braces) if indicated.    Arrange for a responsible person to drive you to and from the hospital on the day of your surgery. Please confirm the visitor policy for the day of your procedure when you receive your phone call with an arrival time.     Call the surgeon's office with any new illnesses, exposures, or additional questions prior to surgery.    Please reference your “My Surgical Experience Booklet” for additional information to prepare for your upcoming surgery.

## 2024-06-25 ENCOUNTER — HOSPITAL ENCOUNTER (OUTPATIENT)
Dept: NON INVASIVE DIAGNOSTICS | Facility: HOSPITAL | Age: 79
Discharge: HOME | End: 2024-06-25
Attending: INTERNAL MEDICINE
Payer: COMMERCIAL

## 2024-06-25 VITALS
SYSTOLIC BLOOD PRESSURE: 146 MMHG | DIASTOLIC BLOOD PRESSURE: 87 MMHG | HEART RATE: 82 BPM | WEIGHT: 178 LBS | BODY MASS INDEX: 28.61 KG/M2 | HEIGHT: 66 IN

## 2024-06-25 DIAGNOSIS — I82.4Z1 ACUTE DEEP VEIN THROMBOSIS (DVT) OF DISTAL VEIN OF RIGHT LOWER EXTREMITY (HCC): ICD-10-CM

## 2024-06-25 DIAGNOSIS — I26.99 PULMONARY EMBOLISM, BILATERAL (HCC): ICD-10-CM

## 2024-06-25 LAB
AORTIC ROOT: 3.1 CM
APICAL FOUR CHAMBER EJECTION FRACTION: 67 %
BSA FOR ECHO PROCEDURE: 1.9 M2
E WAVE DECELERATION TIME: 310 MS
E/A RATIO: 0.68
FRACTIONAL SHORTENING: 26 (ref 28–44)
INTERVENTRICULAR SEPTUM IN DIASTOLE (PARASTERNAL SHORT AXIS VIEW): 1.3 CM
INTERVENTRICULAR SEPTUM: 1.3 CM (ref 0.6–1.1)
LEFT ATRIUM SIZE: 4 CM
LEFT INTERNAL DIMENSION IN SYSTOLE: 3.1 CM (ref 2.1–4)
LEFT VENTRICULAR INTERNAL DIMENSION IN DIASTOLE: 4.2 CM (ref 3.5–6)
LEFT VENTRICULAR POSTERIOR WALL IN END DIASTOLE: 1.2 CM
LEFT VENTRICULAR STROKE VOLUME: 42 ML
LVSV (TEICH): 42 ML
MV E'TISSUE VEL-LAT: 11 CM/S
MV E'TISSUE VEL-SEP: 8 CM/S
MV PEAK A VEL: 1.14 M/S
MV PEAK E VEL: 77 CM/S
MV STENOSIS PRESSURE HALF TIME: 90 MS
MV VALVE AREA P 1/2 METHOD: 2.44
RA PRESSURE ESTIMATED: 3 MMHG
RV PSP: 33 MMHG
SL CV LV EF: 65
SL CV PED ECHO LEFT VENTRICLE DIASTOLIC VOLUME (MOD BIPLANE) 2D: 80 ML
SL CV PED ECHO LEFT VENTRICLE SYSTOLIC VOLUME (MOD BIPLANE) 2D: 38 ML
TR MAX PG: 30 MMHG
TR PEAK VELOCITY: 2.8 M/S
TRICUSPID ANNULAR PLANE SYSTOLIC EXCURSION: 2.5 CM
TRICUSPID VALVE PEAK REGURGITATION VELOCITY: 2.75 M/S

## 2024-06-25 PROCEDURE — 93325 DOPPLER ECHO COLOR FLOW MAPG: CPT

## 2024-06-25 PROCEDURE — 93321 DOPPLER ECHO F-UP/LMTD STD: CPT | Performed by: INTERNAL MEDICINE

## 2024-06-25 PROCEDURE — 93308 TTE F-UP OR LMTD: CPT | Performed by: INTERNAL MEDICINE

## 2024-06-25 PROCEDURE — 93325 DOPPLER ECHO COLOR FLOW MAPG: CPT | Performed by: INTERNAL MEDICINE

## 2024-06-25 PROCEDURE — 93308 TTE F-UP OR LMTD: CPT

## 2024-06-25 PROCEDURE — 93321 DOPPLER ECHO F-UP/LMTD STD: CPT

## 2024-06-26 ENCOUNTER — ANESTHESIA (OUTPATIENT)
Dept: PERIOP | Facility: AMBULARY SURGERY CENTER | Age: 79
End: 2024-06-26
Payer: COMMERCIAL

## 2024-06-26 ENCOUNTER — HOSPITAL ENCOUNTER (OUTPATIENT)
Facility: AMBULARY SURGERY CENTER | Age: 79
Setting detail: OUTPATIENT SURGERY
Discharge: HOME | End: 2024-06-26
Attending: STUDENT IN AN ORGANIZED HEALTH CARE EDUCATION/TRAINING PROGRAM | Admitting: STUDENT IN AN ORGANIZED HEALTH CARE EDUCATION/TRAINING PROGRAM
Payer: COMMERCIAL

## 2024-06-26 ENCOUNTER — APPOINTMENT (OUTPATIENT)
Dept: RADIOLOGY | Facility: AMBULARY SURGERY CENTER | Age: 79
End: 2024-06-26
Payer: COMMERCIAL

## 2024-06-26 VITALS
OXYGEN SATURATION: 95 % | BODY MASS INDEX: 27.74 KG/M2 | DIASTOLIC BLOOD PRESSURE: 71 MMHG | RESPIRATION RATE: 16 BRPM | HEIGHT: 66 IN | TEMPERATURE: 97.3 F | HEART RATE: 83 BPM | SYSTOLIC BLOOD PRESSURE: 139 MMHG | WEIGHT: 172.6 LBS

## 2024-06-26 DIAGNOSIS — S52.502A DISTAL RADIUS FRACTURE, LEFT: ICD-10-CM

## 2024-06-26 PROCEDURE — 25609 OPTX DST RD XART FX/EP SEP3+: CPT | Performed by: STUDENT IN AN ORGANIZED HEALTH CARE EDUCATION/TRAINING PROGRAM

## 2024-06-26 PROCEDURE — C1713 ANCHOR/SCREW BN/BN,TIS/BN: HCPCS | Performed by: STUDENT IN AN ORGANIZED HEALTH CARE EDUCATION/TRAINING PROGRAM

## 2024-06-26 PROCEDURE — 25609 OPTX DST RD XART FX/EP SEP3+: CPT | Performed by: PHYSICIAN ASSISTANT

## 2024-06-26 PROCEDURE — 73100 X-RAY EXAM OF WRIST: CPT

## 2024-06-26 PROCEDURE — 29848 WRIST ENDOSCOPY/SURGERY: CPT | Performed by: STUDENT IN AN ORGANIZED HEALTH CARE EDUCATION/TRAINING PROGRAM

## 2024-06-26 PROCEDURE — 29848 WRIST ENDOSCOPY/SURGERY: CPT | Performed by: PHYSICIAN ASSISTANT

## 2024-06-26 DEVICE — 3.5MM X 12MM NON-LOCKING HEXALOBE SCREW
Type: IMPLANTABLE DEVICE | Site: WRIST | Status: FUNCTIONAL
Brand: ACUMED

## 2024-06-26 DEVICE — 2.7MM X 12MM LOCKING HEXALOBE SCREW
Type: IMPLANTABLE DEVICE | Site: WRIST | Status: FUNCTIONAL
Brand: ACUMED

## 2024-06-26 DEVICE — 2.7MM X 20MM NON-LOCKING HEXALOBE SCREW
Type: IMPLANTABLE DEVICE | Site: WRIST | Status: FUNCTIONAL
Brand: ACUMED

## 2024-06-26 DEVICE — 3.5MM X 14MM NON-LOCKING HEXALOBE SCREW
Type: IMPLANTABLE DEVICE | Site: WRIST | Status: FUNCTIONAL
Brand: ACUMED

## 2024-06-26 DEVICE — ACU-LOC® WRIST SPANNING PLATE, LONG
Type: IMPLANTABLE DEVICE | Site: WRIST | Status: FUNCTIONAL
Brand: ACUMED

## 2024-06-26 DEVICE — 2.7MM X 10MM LOCKING HEXALOBE SCREW
Type: IMPLANTABLE DEVICE | Site: WRIST | Status: FUNCTIONAL
Brand: ACUMED

## 2024-06-26 DEVICE — 3.5MM X 12MM LOCKING HEXALOBE SCREW
Type: IMPLANTABLE DEVICE | Site: WRIST | Status: FUNCTIONAL
Brand: ACUMED

## 2024-06-26 RX ORDER — LIDOCAINE HYDROCHLORIDE 10 MG/ML
0.5 INJECTION, SOLUTION EPIDURAL; INFILTRATION; INTRACAUDAL; PERINEURAL ONCE AS NEEDED
Status: DISCONTINUED | OUTPATIENT
Start: 2024-06-26 | End: 2024-06-26 | Stop reason: HOSPADM

## 2024-06-26 RX ORDER — OXYCODONE HYDROCHLORIDE 5 MG/1
5 TABLET ORAL EVERY 6 HOURS PRN
Status: DISCONTINUED | OUTPATIENT
Start: 2024-06-26 | End: 2024-06-26 | Stop reason: HOSPADM

## 2024-06-26 RX ORDER — GLYCOPYRROLATE 0.2 MG/ML
INJECTION INTRAMUSCULAR; INTRAVENOUS AS NEEDED
Status: DISCONTINUED | OUTPATIENT
Start: 2024-06-26 | End: 2024-06-26

## 2024-06-26 RX ORDER — OXYCODONE HYDROCHLORIDE 5 MG/1
5 TABLET ORAL EVERY 6 HOURS PRN
Qty: 20 TABLET | Refills: 0 | Status: SHIPPED | OUTPATIENT
Start: 2024-06-26

## 2024-06-26 RX ORDER — CEFAZOLIN SODIUM 2 G/50ML
2000 SOLUTION INTRAVENOUS ONCE
Status: COMPLETED | OUTPATIENT
Start: 2024-06-26 | End: 2024-06-26

## 2024-06-26 RX ORDER — PHENYLEPHRINE HCL IN 0.9% NACL 1 MG/10 ML
SYRINGE (ML) INTRAVENOUS AS NEEDED
Status: DISCONTINUED | OUTPATIENT
Start: 2024-06-26 | End: 2024-06-26

## 2024-06-26 RX ORDER — PROPOFOL 10 MG/ML
INJECTION, EMULSION INTRAVENOUS AS NEEDED
Status: DISCONTINUED | OUTPATIENT
Start: 2024-06-26 | End: 2024-06-26

## 2024-06-26 RX ORDER — BUPIVACAINE HYDROCHLORIDE 5 MG/ML
INJECTION, SOLUTION EPIDURAL; INTRACAUDAL
Status: COMPLETED | OUTPATIENT
Start: 2024-06-26 | End: 2024-06-26

## 2024-06-26 RX ORDER — ONDANSETRON 2 MG/ML
4 INJECTION INTRAMUSCULAR; INTRAVENOUS ONCE AS NEEDED
Status: DISCONTINUED | OUTPATIENT
Start: 2024-06-26 | End: 2024-06-26 | Stop reason: HOSPADM

## 2024-06-26 RX ORDER — SODIUM CHLORIDE, SODIUM LACTATE, POTASSIUM CHLORIDE, CALCIUM CHLORIDE 600; 310; 30; 20 MG/100ML; MG/100ML; MG/100ML; MG/100ML
100 INJECTION, SOLUTION INTRAVENOUS CONTINUOUS
Status: DISCONTINUED | OUTPATIENT
Start: 2024-06-26 | End: 2024-06-26 | Stop reason: HOSPADM

## 2024-06-26 RX ORDER — SODIUM CHLORIDE, SODIUM LACTATE, POTASSIUM CHLORIDE, CALCIUM CHLORIDE 600; 310; 30; 20 MG/100ML; MG/100ML; MG/100ML; MG/100ML
125 INJECTION, SOLUTION INTRAVENOUS CONTINUOUS
Status: DISCONTINUED | OUTPATIENT
Start: 2024-06-26 | End: 2024-06-26 | Stop reason: HOSPADM

## 2024-06-26 RX ORDER — MAGNESIUM HYDROXIDE 1200 MG/15ML
LIQUID ORAL AS NEEDED
Status: DISCONTINUED | OUTPATIENT
Start: 2024-06-26 | End: 2024-06-26 | Stop reason: HOSPADM

## 2024-06-26 RX ORDER — ONDANSETRON 2 MG/ML
INJECTION INTRAMUSCULAR; INTRAVENOUS AS NEEDED
Status: DISCONTINUED | OUTPATIENT
Start: 2024-06-26 | End: 2024-06-26

## 2024-06-26 RX ORDER — DEXAMETHASONE SODIUM PHOSPHATE 4 MG/ML
INJECTION, SOLUTION INTRA-ARTICULAR; INTRALESIONAL; INTRAMUSCULAR; INTRAVENOUS; SOFT TISSUE AS NEEDED
Status: DISCONTINUED | OUTPATIENT
Start: 2024-06-26 | End: 2024-06-26

## 2024-06-26 RX ORDER — HYDROMORPHONE HCL/PF 1 MG/ML
0.2 SYRINGE (ML) INJECTION
Status: DISCONTINUED | OUTPATIENT
Start: 2024-06-26 | End: 2024-06-26 | Stop reason: HOSPADM

## 2024-06-26 RX ORDER — FENTANYL CITRATE 50 UG/ML
INJECTION, SOLUTION INTRAMUSCULAR; INTRAVENOUS AS NEEDED
Status: DISCONTINUED | OUTPATIENT
Start: 2024-06-26 | End: 2024-06-26

## 2024-06-26 RX ADMIN — GLYCOPYRROLATE 0.1 MCG: 0.2 INJECTION INTRAMUSCULAR; INTRAVENOUS at 13:09

## 2024-06-26 RX ADMIN — FENTANYL CITRATE 25 MCG: 50 INJECTION INTRAMUSCULAR; INTRAVENOUS at 12:03

## 2024-06-26 RX ADMIN — ONDANSETRON 4 MG: 2 INJECTION INTRAMUSCULAR; INTRAVENOUS at 13:05

## 2024-06-26 RX ADMIN — PROPOFOL 30 MG: 10 INJECTION, EMULSION INTRAVENOUS at 14:47

## 2024-06-26 RX ADMIN — FENTANYL CITRATE 25 MCG: 50 INJECTION INTRAMUSCULAR; INTRAVENOUS at 12:55

## 2024-06-26 RX ADMIN — FENTANYL CITRATE 25 MCG: 50 INJECTION INTRAMUSCULAR; INTRAVENOUS at 12:01

## 2024-06-26 RX ADMIN — Medication 100 MCG: at 13:18

## 2024-06-26 RX ADMIN — Medication 100 MCG: at 13:00

## 2024-06-26 RX ADMIN — CEFAZOLIN SODIUM 2000 MG: 2 SOLUTION INTRAVENOUS at 12:48

## 2024-06-26 RX ADMIN — DEXAMETHASONE SODIUM PHOSPHATE 10 MG: 4 INJECTION INTRA-ARTICULAR; INTRALESIONAL; INTRAMUSCULAR; INTRAVENOUS; SOFT TISSUE at 13:05

## 2024-06-26 RX ADMIN — Medication 100 MCG: at 14:36

## 2024-06-26 RX ADMIN — PROPOFOL 40 MG: 10 INJECTION, EMULSION INTRAVENOUS at 12:52

## 2024-06-26 RX ADMIN — FENTANYL CITRATE 25 MCG: 50 INJECTION INTRAMUSCULAR; INTRAVENOUS at 13:03

## 2024-06-26 RX ADMIN — Medication 100 MCG: at 13:50

## 2024-06-26 RX ADMIN — Medication 100 MCG: at 13:28

## 2024-06-26 RX ADMIN — Medication 100 MCG: at 13:10

## 2024-06-26 RX ADMIN — PROPOFOL 100 MCG/KG/MIN: 10 INJECTION, EMULSION INTRAVENOUS at 12:53

## 2024-06-26 RX ADMIN — SODIUM CHLORIDE, SODIUM LACTATE, POTASSIUM CHLORIDE, AND CALCIUM CHLORIDE: .6; .31; .03; .02 INJECTION, SOLUTION INTRAVENOUS at 11:53

## 2024-06-26 RX ADMIN — BUPIVACAINE HYDROCHLORIDE 30 ML: 5 INJECTION, SOLUTION EPIDURAL; INTRACAUDAL at 11:55

## 2024-06-26 RX ADMIN — Medication 100 MCG: at 13:06

## 2024-06-26 RX ADMIN — Medication 100 MCG: at 13:58

## 2024-06-26 RX ADMIN — Medication 100 MCG: at 13:42

## 2024-06-26 NOTE — OP NOTE
OPERATIVE REPORT  PATIENT NAME: Stormy Hermosillo    :  1945  MRN: [id number]  Pt Location: [facility] OR ROOM 05    SURGERY DATE: 2024     Surgeons and Role:     * Rick Smith MD - Primary     * Vivian Epstein PA-C - Assisting    Physician Assistant need: A physician assistant was required during the procedure for retraction, tissue handling, dissection, and suturing. No qualified resident was available.    Pre-Op Diagnosis Codes:   Closed intra-articular fracture of distal end of left radius  Left carpal tunnel syndrome    Post-Op Diagnosis Codes:  Closed intra-articular fracture of distal end of left radius  Left carpal tunnel syndrome    Procedure(s) (LRB):  OPEN REDUCTION W/ INTERNAL FIXATION (ORIF) WITH DORSAL SPANNING PLATE - LEFT DISTAL RADIUS INTRA-ARTICULAR 3 PART  RELEASE CARPAL TUNNEL ENDOSCOPIC (Left)    Specimen(s):  * No specimens in log *    Estimated Blood Loss:   Minimal    Drains:  None    Implants:  Implant Name Type Inv. Item Serial No.  Lot No. LRB No. Used Action   LOG [id number] - ACUMED DISTAL RADIUS PLATE SET - 1           PLATE WRIST SPANNING LNG ACU-LOC - [id number]  PLATE WRIST SPANNING LNG ACU-LOC  [employer] [id number] Left 1 Implanted   SCREW NON-LCK 2.7 X 10MM HEXALOBE - [id number]  SCREW NON-LCK 2.7 X 10MM HEXALOBE  [employer]  Left 2 Implanted and Explanted   SCREW NON-LCK 2.7 X 10MM HEXALOBE - [id number]  SCREW NON-LCK 2.7 X 10MM HEXALOBE  [employer]  Left 1 Implanted   SCREW LCK HEXALOBE 2.7 X 10MM - [id number]  SCREW LCK HEXALOBE 2.7 X 10MM  [employer]  Left 2 Implanted   SCREW LCK HEXALOBE 2.7 X 12MM - [id number]  SCREW LCK HEXALOBE 2.7 X 12MM  [employer]  Left 1 Implanted   SCREW LCK 3.5 X 12MM HEXALOBE - [id number]  SCREW LCK 3.5 X 12MM HEXALOBE  [employer]  Left 2 Implanted   SCREW NON-LCK  3.5 X 12MM HEXALOBE - [id number]  SCREW NON-LCK  3.5 X 12MM HEXALOBE  [employer]  Left 1 Implanted   SCREW NON-LCK  3.5 X 12MM HEXALOBE - [id number]  SCREW NON-LCK  3.5 X 12MM HEXALOBE  [employer]   Left 1 Wasted   SCREW NON-LCK  3.5 X 14MM HEXALOBE - [id number]  SCREW NON-LCK  3.5 X 14MM HEXALOBE  [employer]  Left 1 Implanted   SCREW NON-LCK  3.5 X 16MM HEXALOBE - [id number]  SCREW NON-LCK  3.5 X 16MM HEXALOBE  [employer]  Left 1 Wasted   2.7mm Non-Locking Screw, 2.7 x 20 mm Screw   [employer]  Left 1 Implanted       Anesthesia Type:   Regional with Sedation    Operative Indications:  Patient is a 78 y.o. female that presented to clinic with left distal radius fracture after injuring their wrist from a fall.  Of note, patient had prior distal radius fracture in 2019 that was treated nonoperatively. We reviewed imaging which demonstrated distal radius fracture with initial radiographs with significant shortening and intra-articular gap. We discussed treatment options, namely closed management and operative fixation. We discussed given fracture pattern, we may need to proceed with dorsal spanning plate.  Discussed risks of surgery including pain, bleeding, infection, need further surgeries including plate removal, nonunion, malunion.  Additionally, patient had numbness to digits concerning for left carpal tunnel syndrome. Patient elected to proceed with operative management in the form of distal radius ORIF and carpal tunnel release.  Informed consent was obtained.    Operative Findings:  Distal radius fracture treated with dorsal spanning plate  Fracture alignment improved with use of dorsal spanning plate  Intra-articular step off noted after dorsal spanning plate application, therefore volar approach was performed to reduce articular surface.  Thick transverse carpal ligament that was able to be released endoscopically.       Complications:   None     Procedure and Technique:  Patient was identified in the preoperative holding area.  Surgical sites were marked with patient participation.  Patient received a block from anesthesia colleagues.  Patient was taken back to the operating theater.  Anesthesia was induced.   Extremity was prepped and draped in typical fashion.  Formal time-out was performed confirming site, patient, laterality, and procedure all present were in agreement.     Procedure began with carpal tunnel release. Incision made over proximal transverse wrist crease. Palmaris longus tendon was retracted radially.  Blunt dissection was carried to the antebrachial fascia, which was entered bluntly.  The dilator was inserted below the antebrachial fascia and above the median nerve.  The dilator was taken distally. The endoscope with bContext endoscopic knife was introduced with care to be below antebrachial fascia and above the median nerve. Good visualization was noted.  The transverse carpal ligament fibers were directly visualized.  The transverse carpal ligament was incised using endoscopic knife.  The wound was reinspected.  There was complete release of the transverse carpal ligament.  Attention was turned proximally through the wound.  The distal 1.5 cm aspect of antebrachial fascia was incised under direct visualization.  Wound was inspected and palpated with the hemostat.  There was no further sites of compression. Wound was irrigated. Skin was closed with 4-0 chromic in horizontal mattress fashion.     Standard dissection was performed over from 2nd metacarpal.  Extensor tendons were retracted ulnarly and protected during the entire procedure. Subperiosteal exposure was performed on the second metacarpal.  Attention was turned to radial shaft. A dorsal incision over radial shaft was performed.  Dissection was carried between the extensor tendons and down to the bone. An elevator was introduced below the extensor tendons distally and taken proximally over the radial shaft. The dorsal spanning plate was introduced distally and inserted in a retrograde fashion.  1 non-locking and 3 locking bicortical screws were placed in the second metacarpal.  The oblong hole was filled with a bicortical nonlocking screw while  traction was applied for reduction.  This did not provide an adequate reduction, therefore, a push pull screw was applied proximally.  A laminar  was applied and used to gain length of the radius.  Once satisfactory reduction was achieved, 2 non-locking and 1 locking bicortical screws were placed in radial shaft.  The bicortical cortical push pull screw was removed.  It was still noted that there was still an intra-articular step-off with elevation of the volar fragment proximally.  A joker elevator was used to probe into the fracture site through the dorsal incision, however, attempts to reduce this were unsuccessful.  Decision was made to proceed with volar approach.  A 4 cm standard FCR approach was performed.  Care was taken to protect the median nerve, palmar cutaneous branch of median nerve, and radial artery.  The volar fracture fragments were elevated.  A large chunk of cancellous bone was noted at the fracture site and removed.  After this, the articular surface was able to be well reduced.  To hold this reduction, a 2.7 screw was placed dorsally through the plate at the level of the metaphysis. Final radiographs were performed confirming appropriate reduction and alignment of hardware. Wounds were thoroughly irrigated with saline.  Inverted simple 4-0 Vicryl closed the deep dermal layer.  Running 4-0 Monocryl closed the skin.  Dermabond was applied.  Wound was dressed with Xeroform, 4x4s, cast padding, dorsal slab splint, and Ace bandage. Tourniquet was deflated. Patient was taken to PACU in stable condition.  I was present for entire procedure     Postoperative Plan:  We will evaluate patient at the 2-week postoperative nakia.  At that time, we will place patient in a removable wrist splint.  Plan for likely hardware removal around the 6 to 12 week nakia pending radiographic signs of healing.      Patient Disposition:  PACU         SIGNATURE: Rick Smith MD  DATE: June 26, 2024  TIME: 3:56  PM

## 2024-06-26 NOTE — ANESTHESIA PREPROCEDURE EVALUATION
Procedure:  OPEN REDUCTION W/ INTERNAL FIXATION (ORIF) RADIUS  (WRIST) (Left: Wrist)  RELEASE CARPAL TUNNEL ENDOSCOPIC (Left: Wrist)    Relevant Problems   CARDIO   (+) Acute deep vein thrombosis (DVT) of distal vein of right lower extremity (HCC)   (+) Atrial flutter, paroxysmal (HCC)   (+) Hyperlipidemia   (+) PVC's (premature ventricular contractions)      GI/HEPATIC   (+) GERD (gastroesophageal reflux disease)      MUSCULOSKELETAL   (+) Localized primary osteoarthritis of carpometacarpal (CMC) joint of right wrist   (+) Primary osteoarthritis of right hand   (+) Unilateral primary osteoarthritis, right knee        Physical Exam    Airway    Mallampati score: II  TM Distance: >3 FB  Neck ROM: full     Dental   No notable dental hx     Cardiovascular  Rhythm: regular, Rate: normal, Cardiovascular exam normal    Pulmonary  Pulmonary exam normal Breath sounds clear to auscultation    Other Findings  post-pubertal.      Anesthesia Plan  ASA Score- 2     Anesthesia Type- IV sedation with anesthesia with ASA Monitors.         Additional Monitors:     Airway Plan:     Comment: Discussed risks/benefits, including medication reactions, awareness, aspiration, and serious/life threatening complications.    Plan to maintain native airway with IVGA, monitored with EtCO2.       Plan Factors-Exercise tolerance (METS): >4 METS.    Chart reviewed.    Patient summary reviewed.      Patient instructed to abstain from smoking on day of procedure. Patient did not smoke on day of surgery.            Induction- intravenous.    Postoperative Plan-         Informed Consent- Anesthetic plan and risks discussed with patient.  I personally reviewed this patient with the CRNA. Discussed and agreed on the Anesthesia Plan with the CRNA..

## 2024-06-26 NOTE — INTERVAL H&P NOTE
H&P reviewed. After examining the patient I find no changes in the patients condition since the H&P had been written. Patient endorses continued numbness to thumb. Given symptoms, plan to proceed with carpal tunnel release today as well.     Vitals:    06/26/24 1057   BP: (!) 178/90   Pulse: 83   Resp: 18   Temp: (!) 97.3 °F (36.3 °C)   SpO2: 96%

## 2024-06-26 NOTE — ANESTHESIA PROCEDURE NOTES
Peripheral Block    Patient location during procedure: pre-op  Start time: 6/26/2024 11:55 AM  Reason for block: at surgeon's request and post-op pain management  Staffing  Performed by: Fabio Parekh MD  Authorized by: Fabio Parekh MD    Preanesthetic Checklist  Completed: patient identified, IV checked, site marked, risks and benefits discussed, surgical consent, monitors and equipment checked, pre-op evaluation and timeout performed  Peripheral Block  Patient position: sitting  Prep: ChloraPrep  Patient monitoring: heart rate, cardiac monitor, frequent blood pressure checks and continuous pulse oximetry  Block type: Supraclavicular  Laterality: left  Injection technique: single-shot  Procedures: ultrasound guided, Ultrasound guidance required for the procedure to increase accuracy and safety of medication placement and decrease risk of complications.  Ultrasound permanent image saved  bupivacaine (PF) (MARCAINE) 0.5 % injection 20 mL - Perineural   30 mL - 6/26/2024 11:55:00 AM  Needle  Needle type: Stimuplex   Needle gauge: 22 G  Needle length: 4 in  Needle localization: ultrasound guidance and anatomical landmarks  Assessment  Injection assessment: incremental injection, local visualized surrounding nerve on ultrasound, negative aspiration for heme, no paresthesia on injection, negative aspiration, negative for heart rate change, injected with ease, no symptoms of intraneural/intravenous injection, frequent aspiration and needle tip visualized at all times  Paresthesia pain: none  Post-procedure:  site cleaned  patient tolerated the procedure well with no immediate complications  Additional Notes  Unremarkable supraclavicular nerve block

## 2024-06-26 NOTE — DISCHARGE INSTR - AVS FIRST PAGE
Post Operative Instructions    You have had surgery on your arm today, please read and follow the information below:  Elevate your hand above your elbow during the next 24-48 hours to help with swelling.  Place your hand and arm over your head with motion at your shoulder three times a day.  Do not apply any cream/ointment/oil to your incisions including antibiotics (I.e.Neosporin)  Do not use peroxide to clean your wound   Do not soak your hands in standing water (dishwater, tubs, Jacuzzi's, pools, etc.) until given permission (typically 2-3 weeks after injury)    Call the office if you notice any:  Increased numbness or tingling of your hand or fingers that is not relieved with elevation.  Increasing pain that is not controlled with medication.  Difficulty chewing, breathing, swallowing.  Chest pains or shortness of breath.  Fever over 101.4 degrees.    Bandage: Do NOT remove bandage until follow-up appointment.    Motion: Move fingers into a fist 5 times a day, DO NOT move any splinted fingers.    Weight bearing status: The operated extremity should be non-weight bearing until further notice.    Ice: Ice for 10 minutes every hour as needed for swelling x 24 hours.    Sling: Please use your sling while your arm is numb from the block. When your arm is FULLY awake again, you no longer need this and may use your sling as needed for comfort. While using the sling, make sure to move your shoulder throughout the day to prevent stiffness here.     Pain medication:   Oxycodone 1 tab every 6 hours AS NEEDED for pain  *   You may take Tylenol (acetaminophen) in addition to your pain medication. This is over the counter. Please follow the instructions on the bottle. BE AWARE - your pain medication (hydrocodone/oxycodone) may already include acetaminophen in it. If it does, you must include this in your daily amount and make sure not take more than 3000 mg per day.     Antibiotics:  None     Therapy: No therapy needed at  this time.    Follow-up Appointment: 10-14 days.        Please call the office if you have any questions or concerns regarding your post-operative care.

## 2024-06-26 NOTE — ANESTHESIA POSTPROCEDURE EVALUATION
Post-Op Assessment Note    CV Status:  Stable  Pain Score: 0    Pain management: adequate       Mental Status:  Alert   Hydration Status:  Stable   PONV Controlled:  None   Airway Patency:  Patent     Post Op Vitals Reviewed: Yes    No anethesia notable event occurred.    Staff: CRNA               BP   127/75   Temp   97   Pulse  77   Resp   14   SpO2   93

## 2024-06-27 ENCOUNTER — TELEPHONE (OUTPATIENT)
Dept: OBGYN CLINIC | Facility: CLINIC | Age: 79
End: 2024-06-27

## 2024-06-29 ENCOUNTER — TELEPHONE (OUTPATIENT)
Dept: OBGYN CLINIC | Facility: HOSPITAL | Age: 79
End: 2024-06-29

## 2024-06-29 ENCOUNTER — TELEPHONE (OUTPATIENT)
Dept: OTHER | Facility: HOSPITAL | Age: 79
End: 2024-06-29

## 2024-06-29 NOTE — TELEPHONE ENCOUNTER
Patient called stated she had sx on 6/26/24 left hand. She stated her fingers are discolored. Greenish blue. Please advise patient. Patient phone number [phone]     Messaged on call

## 2024-06-29 NOTE — QUICK NOTE
Patient contacted us regarding greenish and bluish discoloration to fingers. Patient able to move digits. Denies numbness and tingling. Brisk capillary refill. Stable pain. Discussed with patient likely bruising. Continue to monitor. If symptoms worsen or concerning findings, instructed to call us back.

## 2024-07-01 NOTE — TELEPHONE ENCOUNTER
Greenish-blue I am guessing is most likely bruising which is very normal after this type of surgery. Could we get patient to send photos just to be safe? And just make sure splint not too tight and if any n/t in the fingers? Thanks so much! (Sorry, not sure what on-call Doc advised)

## 2024-07-01 NOTE — TELEPHONE ENCOUNTER
Tried to call patient just to make sure everything was taken care of. Then spoke with Dr. Smith and found out he was actually the on-call doc they reached out to and he had already spoken with her the day she called. Please see his separate phone message:    Patient contacted us regarding greenish and bluish discoloration to fingers. Patient able to move digits. Denies numbness and tingling. Brisk capillary refill. Stable pain. Discussed with patient likely bruising. Continue to monitor. If symptoms worsen or concerning findings, instructed to call us back.

## 2024-07-01 NOTE — TELEPHONE ENCOUNTER
CLM on VM for pt to return call to relay JHOANA Epstein's msg. Await CB. Will request to see if pt can send picture to my work email at [email or web address] to include something in subject line,  and name.

## 2024-07-12 ENCOUNTER — HOSPITAL ENCOUNTER (OUTPATIENT)
Dept: RADIOLOGY | Facility: HOSPITAL | Age: 79
End: 2024-07-12
Attending: STUDENT IN AN ORGANIZED HEALTH CARE EDUCATION/TRAINING PROGRAM
Payer: COMMERCIAL

## 2024-07-12 ENCOUNTER — OFFICE VISIT (OUTPATIENT)
Dept: OBGYN CLINIC | Facility: CLINIC | Age: 79
End: 2024-07-12

## 2024-07-12 VITALS — BODY MASS INDEX: 27.64 KG/M2 | HEIGHT: 66 IN | WEIGHT: 172 LBS

## 2024-07-12 DIAGNOSIS — S52.502A CLOSED FRACTURE OF DISTAL END OF LEFT RADIUS, UNSPECIFIED FRACTURE MORPHOLOGY, INITIAL ENCOUNTER: ICD-10-CM

## 2024-07-12 DIAGNOSIS — S52.502A CLOSED FRACTURE OF DISTAL END OF LEFT RADIUS, UNSPECIFIED FRACTURE MORPHOLOGY, INITIAL ENCOUNTER: Primary | ICD-10-CM

## 2024-07-12 PROCEDURE — 99024 POSTOP FOLLOW-UP VISIT: CPT | Performed by: STUDENT IN AN ORGANIZED HEALTH CARE EDUCATION/TRAINING PROGRAM

## 2024-07-12 PROCEDURE — 73110 X-RAY EXAM OF WRIST: CPT

## 2024-07-12 RX ORDER — CHLORHEXIDINE GLUCONATE ORAL RINSE 1.2 MG/ML
15 SOLUTION DENTAL ONCE
OUTPATIENT
Start: 2024-07-12 | End: 2024-07-12

## 2024-07-12 NOTE — PROGRESS NOTES
Assessment/Plan:  Patient ID: 78 y.o. female   Surgery: Open Reduction W/ Internal Fixation (orif) Radius  (wrist) - Left and Release Carpal Tunnel Endoscopic - Left  Date of Surgery: 6/26/2024    XRs reviewed with the pt today.  Findings at time of surgery discussed and the need for volar incision.  Pt provided cock up wrist brace to use prn.  Encouraged to continue working on finger and elbow ROM to prevent stiffness.  We will plan for SHANELL 8/14 and have the pt follow up the week before to repeat XRs and make sure enough healing is seen to proceed.      Follow Up:  4 weeks    To Do Next Visit:  X-rays of the  left  wrist with 20 degree elevated lateral view      CHIEF COMPLAINT:  Chief Complaint   Patient presents with   • Left Wrist - Post-op         SUBJECTIVE:  Patient is a 78 y.o. year old female who presents for follow up after Open Reduction W/ Internal Fixation (orif) Radius  (wrist) - Left and Release Carpal Tunnel Endoscopic - Left. Today patient states she has some pain, more at the base of the thumb than the wrist. Takes Tylenol for pain. She describes numbness on back of thumb, but not anywhere else.       PHYSICAL EXAMINATION:  General: Well developed and well nourished, alert and oriented x 3, appears comfortable  Psychiatric: Normal    MUSCULOSKELETAL EXAMINATION:  Incision: Clean, dry, intact  Surgery Site: normal, no evidence of infection   Range of Motion: As expected, loose composite fist. EPL/FPL firing. Full elbow flexion/extension. Supination 40, Pronation 60  Neurovascular status: Neuro intact other than some n/t along SSBRN, good cap refill         STUDIES REVIEWED:  Xrays of the left wrist were reviewed and independently interpreted in PACS by Dr. Smith and demonstrate improved alignment of distal radius fracture s/p dorsal spanning plate. Hardware intact.         PROCEDURES PERFORMED:  Procedures  No Procedures performed today    Scribe Attestation    I,:  Vivian Epstein PA-C am  acting as a scribe while in the presence of the attending physician.:       I,:  Rick Smith MD personally performed the services described in this documentation    as scribed in my presence.:

## 2024-07-17 ENCOUNTER — OFFICE VISIT (OUTPATIENT)
Dept: PULMONOLOGY | Facility: CLINIC | Age: 79
End: 2024-07-17
Payer: COMMERCIAL

## 2024-07-17 VITALS
WEIGHT: 177 LBS | SYSTOLIC BLOOD PRESSURE: 116 MMHG | HEART RATE: 72 BPM | HEIGHT: 66 IN | DIASTOLIC BLOOD PRESSURE: 84 MMHG | TEMPERATURE: 98.9 F | BODY MASS INDEX: 28.45 KG/M2 | OXYGEN SATURATION: 98 %

## 2024-07-17 DIAGNOSIS — I26.99 PULMONARY EMBOLISM, BILATERAL (HCC): ICD-10-CM

## 2024-07-17 DIAGNOSIS — I82.4Z1 ACUTE DEEP VEIN THROMBOSIS (DVT) OF DISTAL VEIN OF RIGHT LOWER EXTREMITY (HCC): ICD-10-CM

## 2024-07-17 PROCEDURE — G2211 COMPLEX E/M VISIT ADD ON: HCPCS | Performed by: INTERNAL MEDICINE

## 2024-07-17 PROCEDURE — 99214 OFFICE O/P EST MOD 30 MIN: CPT | Performed by: INTERNAL MEDICINE

## 2024-07-17 NOTE — PROGRESS NOTES
Consultation - Pulmonary Medicine   Stormy Hermosillo 78 y.o. female MRN: [id number]    Physician Requesting Consult: post hospital  Reason for Consult: PE    Stormy Hermosillo is a 78 y.o. female afib, gerd, OA, who presents for post hospital follow up for PE.    # PE, intermediate high risk  # DVT  Recently hospitlized March 2024 for PE. Likely provoked in setting of knee surgery. TTE with mild RV dysfunction and imaging w b/l PE  Sob resolved, no bleeding, satting well on RA  Repeat TTE in June with normal RV    - overall pt completed 3 months of AC  - however would like to stay on Eliquis for Afib (see below)    # Afib  Chads vasc score 6 high risk due to age, hx VTE and gender  - would benefit from DOAC for stroke prevention and pt would like to be on it  - giving turning 79 soon, would change to 2.5 BID, and have further follow up by PCP    Follow Up prn    Vaccines      Immunization History   Administered Date(s) Administered    COVID-19 PFIZER VACCINE 0.3 ML IM 03/08/2021, 03/29/2021, 12/08/2021    COVID-19 Pfizer vac (Iván-sucrose, gray cap) 12 yr+ IM 04/12/2022    Tdap 06/19/2024      _____________________________________    Interval Hx:    Recently in ED for wrist injury  Repeat TE normal  Breathing normal  No bleeding or bruising    HPI:    Stormy Hermosillo is a 78 y.o. female hx afib, gerd, OA, who presents for post hospital follow up for PE.    Recetnlya dmitted for DVT and PE (intermediate high risk with RH strain) a week ago, provoked by recent knee surgery  Discharged on Eliquis    Since discharge feeling good, no shortness of breaht, no chest pain, dizziness or lightheadedness  Does have knee swelling, following with ortho  No bleeding episodes or bruising since left the hospital        Review of Systems:  Review of Systems  Aside from what is mentioned in the HPI, the review of systems otherwise negative.    Current Medications:    Current Outpatient Medications:     acetaminophen  (TYLENOL) 500 mg tablet, Take 500 mg by mouth every 6 (six) hours, Disp: , Rfl:     apixaban (Eliquis) 5 mg, Take 1 tablet (5 mg total) by mouth 2 (two) times a day, Disp: 180 tablet, Rfl: 0    aspirin 81 MG tablet, Take 81 mg by mouth 2 (two) times a day, Disp: , Rfl:     atorvastatin (LIPITOR) 10 mg tablet, Take 10 mg by mouth daily, Disp: , Rfl: 0    BIOTIN PO, Take by mouth, Disp: , Rfl:     Calcium Carbonate-Vitamin D (CALCIUM 600+D PO), Take by mouth, Disp: , Rfl:     guaiFENesin (MUCINEX) 600 mg 12 hr tablet, Take 600 mg by mouth, Disp: , Rfl:     metoprolol succinate (TOPROL-XL) 25 mg 24 hr tablet, Take 1 tablet (25 mg total) by mouth daily, Disp: 30 tablet, Rfl: 0    mometasone (NASONEX) 50 mcg/act nasal spray, 2 sprays daily, Disp: , Rfl:     Multiple Vitamins-Minerals (MULTIVITAMIN ADULT PO), Take 1 tablet by mouth, Disp: , Rfl:     omeprazole (PriLOSEC) 20 mg delayed release capsule, Take 20 mg by mouth daily, Disp: , Rfl:     oxyCODONE (ROXICODONE) 5 immediate release tablet, Take 1 tablet (5 mg total) by mouth every 6 (six) hours as needed for moderate pain or severe pain for up to 20 doses Cont of tx Max Daily Amount: 20 mg (Patient not taking: Reported on 2024), Disp: 20 tablet, Rfl: 0    Current Facility-Administered Medications:     bupivacaine (MARCAINE) 0.25 % injection 0.5 mL, 0.5 mL, Injection, , Bharath Uribe DPM, 0.5 mL at 22 2368    Historical Information   Past Medical History:   Diagnosis Date    Atrial flutter (HCC)     Hearing aid worn     Pulmonary embolism (HCC)      Past Surgical History:   Procedure Laterality Date    BACK SURGERY      BREAST SURGERY       SECTION      x3    FOOT SURGERY Bilateral     HIP SURGERY Left     KNEE SURGERY Bilateral     AL NDSC WRST SURG W/RLS TRANSVRS CARPL LIGM Left 2024    Procedure: RELEASE CARPAL TUNNEL ENDOSCOPIC;  Surgeon: Rick Smith MD;  Location: [facility] OR;  Service: Orthopedics    AL OPTX DSTL RADL  "I-ARTIC FX/EPIPHYSL SEP 3 FRAG Left 6/26/2024    Procedure: OPEN REDUCTION W/ INTERNAL FIXATION (ORIF) RADIUS  (WRIST);  Surgeon: Rick Smith MD;  Location: [facility];  Service: Orthopedics     Social History   Social History     Tobacco Use   Smoking Status Never   Smokeless Tobacco Never       Family History:   Family History   Problem Relation Age of Onset    Heart disease Mother     Cancer Mother     Varicose Veins Mother     Heart disease Father     Heart disease Sister     Cancer Sister     Cancer Brother     Varicose Veins Maternal Grandmother          PhysicalExamination:  Vitals:   /84 (BP Location: Left arm, Patient Position: Sitting, Cuff Size: Standard)   Pulse 72   Temp 98.9 °F (37.2 °C) (Tympanic)   Ht 5' 6\" (1.676 m)   Wt 80.3 kg (177 lb)   SpO2 98%   BMI 28.57 kg/m²     Appearance -- NAD, speaking full sentences  HEENT -- anicteric sclera, clear OP, MMM  Neck -- no JVD  Heart -- RRR, no murmurs  Lungs -- CTAB  Abdomen -- soft, NTND, +bs  Extremities -- WWP, no LE edema  Skin -- no rash  Neuro -- A&Ox3, wnl  Psych -- no obvious depression or hallucination        Diagnostic Data:  Labs:  I personally reviewed the most recent laboratory data pertinent to today's visit    Lab Results   Component Value Date    WBC 8.69 06/19/2024    HGB 14.2 06/19/2024    HCT 44.8 06/19/2024    MCV 91 06/19/2024     06/19/2024     Lab Results   Component Value Date    CALCIUM 9.4 06/19/2024    K 4.4 06/19/2024    CO2 29 06/19/2024     06/19/2024    BUN 20 06/19/2024    CREATININE 0.81 06/19/2024     No results found for: \"IGE\"  Lab Results   Component Value Date    ALT 15 06/19/2024    AST 17 06/19/2024    ALKPHOS 77 06/19/2024       PFT results:  The most recent pulmonary function tests were reviewed.  None    Imaging:  I personally reviewed the images on the PAC system pertinent to today's visit  CTA Chest 3/18/24:  Extensive bilateral pulmonary emboli involving the main arteries and " lobar branches with some extending distally to the subsegmental level. No discrete areas of parenchymal infarction.  2.  The calculated ratio of right ventricular to left ventricular diameter (RV/LV ratio) is 1.14.    Other studies:  TTE 3/18/24: Left ventricular cavity size is normal. Wall thickness is mildly increased. The left ventricular ejection fraction is 65%. Systolic function is normal. Diastolic function is mildly abnormal, consistent with grade I (abnormal) relaxation.    Right Ventricle: Right ventricle is not well visualized. Right ventricular cavity size is dilated. Visually estimated systolic function is mildly reduced.  There is free wall hypokinesis.  However there is normal tricuspid annular plane systolic excursion (TAPSE) > 1.7 cm.    Aortic Valve: There is mild regurgitation. There is aortic valve sclerosis.    Tricuspid Valve: There is moderate regurgitation. The right ventricular systolic pressure is mildly to moderately elevated. The estimated right ventricular systolic pressure is 52.00 mmHg.    Pulmonic Valve: There is mild regurgitation.    Pericardium: There is no pericardial effusion.    TTE 6/2024:    Left Ventricle: Wall thickness is mildly increased. The left ventricular ejection fraction is 65% by visual estimation. Systolic function is normal. Wall motion is normal. Diastolic function is mildly abnormal, consistent with grade I (abnormal) relaxation.    Right Ventricle: Right ventricular cavity size is normal. Systolic function is normal.      Teresita Gil MD  [facility]

## 2024-07-17 NOTE — LETTER
July 17, 2024     Mateo Mahoney DO  [address] [facility] [address]    Patient: Stormy Hermosillo   YOB: 1945   Date of Visit: 7/17/2024       Dear Dr. Mahoney:    Thank you for referring Stormy Hermosillo to me for evaluation. Below are my notes for this consultation.    If you have questions, please do not hesitate to call me. I look forward to following your patient along with you.         Sincerely,        Teresita Gil MD        CC: No Recipients    Teresita Gil MD  7/17/2024  9:31 AM  Sign when Signing Visit      Consultation - Pulmonary Medicine   Stormy Hermosillo 78 y.o. female MRN: [id number]    Physician Requesting Consult: post hospital  Reason for Consult: PE    Stormy Hermosillo is a 78 y.o. female afib, gerd, OA, who presents for post hospital follow up for PE.    # PE, intermediate high risk  # DVT  Recently hospitlized March 2024 for PE. Likely provoked in setting of knee surgery. TTE with mild RV dysfunction and imaging w b/l PE  Sob resolved, no bleeding, satting well on RA  Repeat TTE in June with normal RV    - overall pt completed 3 months of AC  - however would like to stay on Eliquis for Afib (see below)    # Afib  Chads vasc score 6 high risk due to age, hx VTE and gender  - would benefit from DOAC for stroke prevention and pt would like to be on it  - giving turning 79 soon, would change to 2.5 BID, and have further follow up by PCP    Follow Up prn    Vaccines      Immunization History   Administered Date(s) Administered   • COVID-19 PFIZER VACCINE 0.3 ML IM 03/08/2021, 03/29/2021, 12/08/2021   • COVID-19 Pfizer vac (Iván-sucrose, gray cap) 12 yr+ IM 04/12/2022   • Tdap 06/19/2024      _____________________________________    Interval Hx:    Recently in ED for wrist injury  Repeat TE normal  Breathing normal  No bleeding or bruising    HPI:    Stormy Hermosillo is a 78 y.o. female hx afib, gerd, OA, who presents for post hospital follow up for  PE.    [facility] dmitted for DVT and PE (intermediate high risk with RH strain) a week ago, provoked by recent knee surgery  Discharged on Eliquis    Since discharge feeling good, no shortness of breaht, no chest pain, dizziness or lightheadedness  Does have knee swelling, following with ortho  No bleeding episodes or bruising since left the hospital        Review of Systems:  Review of Systems  Aside from what is mentioned in the HPI, the review of systems otherwise negative.    Current Medications:    Current Outpatient Medications:   •  acetaminophen (TYLENOL) 500 mg tablet, Take 500 mg by mouth every 6 (six) hours, Disp: , Rfl:   •  apixaban (Eliquis) 5 mg, Take 1 tablet (5 mg total) by mouth 2 (two) times a day, Disp: 180 tablet, Rfl: 0  •  aspirin 81 MG tablet, Take 81 mg by mouth 2 (two) times a day, Disp: , Rfl:   •  atorvastatin (LIPITOR) 10 mg tablet, Take 10 mg by mouth daily, Disp: , Rfl: 0  •  BIOTIN PO, Take by mouth, Disp: , Rfl:   •  Calcium Carbonate-Vitamin D (CALCIUM 600+D PO), Take by mouth, Disp: , Rfl:   •  guaiFENesin (MUCINEX) 600 mg 12 hr tablet, Take 600 mg by mouth, Disp: , Rfl:   •  metoprolol succinate (TOPROL-XL) 25 mg 24 hr tablet, Take 1 tablet (25 mg total) by mouth daily, Disp: 30 tablet, Rfl: 0  •  mometasone (NASONEX) 50 mcg/act nasal spray, 2 sprays daily, Disp: , Rfl:   •  Multiple Vitamins-Minerals (MULTIVITAMIN ADULT PO), Take 1 tablet by mouth, Disp: , Rfl:   •  omeprazole (PriLOSEC) 20 mg delayed release capsule, Take 20 mg by mouth daily, Disp: , Rfl:   •  oxyCODONE (ROXICODONE) 5 immediate release tablet, Take 1 tablet (5 mg total) by mouth every 6 (six) hours as needed for moderate pain or severe pain for up to 20 doses Cont of tx Max Daily Amount: 20 mg (Patient not taking: Reported on 7/12/2024), Disp: 20 tablet, Rfl: 0    Current Facility-Administered Medications:   •  bupivacaine (MARCAINE) 0.25 % injection 0.5 mL, 0.5 mL, Injection, , Bharath Uribe DPM, 0.5 mL at  "22 1559    Historical Information  Past Medical History:   Diagnosis Date   • Atrial flutter (HCC)    • Hearing aid worn    • Pulmonary embolism (HCC)      Past Surgical History:   Procedure Laterality Date   • BACK SURGERY     • BREAST SURGERY     •  SECTION      x3   • FOOT SURGERY Bilateral    • HIP SURGERY Left    • KNEE SURGERY Bilateral    • MN NDSC WRST SURG W/RLS TRANSVRS CARPL LIGM Left 2024    Procedure: RELEASE CARPAL TUNNEL ENDOSCOPIC;  Surgeon: Rick Smith MD;  Location: [facility] MAIN OR;  Service: Orthopedics   • MN OPTX DSTL RADL I-ARTIC FX/EPIPHYSL SEP 3 FRAG Left 2024    Procedure: OPEN REDUCTION W/ INTERNAL FIXATION (ORIF) RADIUS  (WRIST);  Surgeon: Rick Smith MD;  Location: [facility] MAIN OR;  Service: Orthopedics     Social History  Social History     Tobacco Use   Smoking Status Never   Smokeless Tobacco Never       Family History:   Family History   Problem Relation Age of Onset   • Heart disease Mother    • Cancer Mother    • Varicose Veins Mother    • Heart disease Father    • Heart disease Sister    • Cancer Sister    • Cancer Brother    • Varicose Veins Maternal Grandmother          PhysicalExamination:  Vitals:   /84 (BP Location: Left arm, Patient Position: Sitting, Cuff Size: Standard)   Pulse 72   Temp 98.9 °F (37.2 °C) (Tympanic)   Ht 5' 6\" (1.676 m)   Wt 80.3 kg (177 lb)   SpO2 98%   BMI 28.57 kg/m²     Appearance -- NAD, speaking full sentences  HEENT -- anicteric sclera, clear OP, MMM  Neck -- no JVD  Heart -- RRR, no murmurs  Lungs -- CTAB  Abdomen -- soft, NTND, +bs  Extremities -- WWP, no LE edema  Skin -- no rash  Neuro -- A&Ox3, wnl  Psych -- no obvious depression or hallucination        Diagnostic Data:  Labs:  I personally reviewed the most recent laboratory data pertinent to today's visit    Lab Results   Component Value Date    WBC 8.69 2024    HGB 14.2 2024    HCT 44.8 2024    MCV 91 2024     " "06/19/2024     Lab Results   Component Value Date    CALCIUM 9.4 06/19/2024    K 4.4 06/19/2024    CO2 29 06/19/2024     06/19/2024    BUN 20 06/19/2024    CREATININE 0.81 06/19/2024     No results found for: \"IGE\"  Lab Results   Component Value Date    ALT 15 06/19/2024    AST 17 06/19/2024    ALKPHOS 77 06/19/2024       PFT results:  The most recent pulmonary function tests were reviewed.  None    Imaging:  I personally reviewed the images on the PAC system pertinent to today's visit  CTA Chest 3/18/24:  Extensive bilateral pulmonary emboli involving the main arteries and lobar branches with some extending distally to the subsegmental level. No discrete areas of parenchymal infarction.  2.  The calculated ratio of right ventricular to left ventricular diameter (RV/LV ratio) is 1.14.    Other studies:  TTE 3/18/24: Left ventricular cavity size is normal. Wall thickness is mildly increased. The left ventricular ejection fraction is 65%. Systolic function is normal. Diastolic function is mildly abnormal, consistent with grade I (abnormal) relaxation.  •  Right Ventricle: Right ventricle is not well visualized. Right ventricular cavity size is dilated. Visually estimated systolic function is mildly reduced.  There is free wall hypokinesis.  However there is normal tricuspid annular plane systolic excursion (TAPSE) > 1.7 cm.  •  Aortic Valve: There is mild regurgitation. There is aortic valve sclerosis.  •  Tricuspid Valve: There is moderate regurgitation. The right ventricular systolic pressure is mildly to moderately elevated. The estimated right ventricular systolic pressure is 52.00 mmHg.  •  Pulmonic Valve: There is mild regurgitation.  •  Pericardium: There is no pericardial effusion.    TTE 6/2024:    Left Ventricle: Wall thickness is mildly increased. The left ventricular ejection fraction is 65% by visual estimation. Systolic function is normal. Wall motion is normal. Diastolic function is mildly " abnormal, consistent with grade I (abnormal) relaxation.  •  Right Ventricle: Right ventricular cavity size is normal. Systolic function is normal.      Teresita Gil MD  [facility]

## 2024-07-31 ENCOUNTER — ANESTHESIA EVENT (OUTPATIENT)
Dept: PERIOP | Facility: AMBULARY SURGERY CENTER | Age: 79
End: 2024-07-31
Payer: COMMERCIAL

## 2024-08-07 ENCOUNTER — TELEPHONE (OUTPATIENT)
Age: 79
End: 2024-08-07

## 2024-08-07 NOTE — PRE-PROCEDURE INSTRUCTIONS
Pre-Surgery Instructions:   Medication Instructions    acetaminophen (TYLENOL) 500 mg tablet Uses PRN- OK to take day of surgery    apixaban (Eliquis) 2.5 mg Instructions provided by MD    aspirin 81 MG tablet Instructions provided by MD    atorvastatin (LIPITOR) 10 mg tablet Take day of surgery.    BIOTIN PO Stop taking 7 days prior to surgery.    Calcium Carbonate-Vitamin D (CALCIUM 600+D PO) Stop taking 7 days prior to surgery.    metoprolol succinate (TOPROL-XL) 25 mg 24 hr tablet Take day of surgery.    mometasone (NASONEX) 50 mcg/act nasal spray Uses PRN- OK to take day of surgery    Multiple Vitamins-Minerals (MULTIVITAMIN ADULT PO) Stop taking 7 days prior to surgery.    omeprazole (PriLOSEC) 20 mg delayed release capsule Take day of surgery.   Medication instructions for day surgery reviewed. Please use only a sip of water to take your instructed medications. Avoid all over the counter vitamins, supplements and NSAIDS for one week prior to surgery per anesthesia guidelines. Tylenol is ok to take as needed.     You will receive a call one business day prior to surgery with an arrival time and hospital directions. If your surgery is scheduled on a Monday, the hospital will be calling you on the Friday prior to your surgery. If you have not heard from anyone by 8pm, please call the hospital supervisor through the hospital  at [phone]. ([place] [phone] or [place] [phone]).    Do not eat or drink anything after midnight the night before your surgery, including candy, mints, lifesavers, or chewing gum. Do not drink alcohol 24hrs before your surgery. Try not to smoke at least 24hrs before your surgery.       Follow the pre surgery showering instructions as listed in the “My Surgical Experience Booklet” or otherwise provided by your surgeon's office. Do not use a blade to shave the surgical area 1 week before surgery. It is okay to use a clean electric clippers up to 24 hours before  surgery. Do not apply any lotions, creams, including makeup, cologne, deodorant, or perfumes after showering on the day of your surgery. Do not use dry shampoo, hair spray, hair gel, or any type of hair products.     No contact lenses, eye make-up, or artificial eyelashes. Remove nail polish, including gel polish, and any artificial, gel, or acrylic nails if possible. Remove all jewelry including rings and body piercing jewelry.     Wear causal clothing that is easy to take on and off. Consider your type of surgery.    Keep any valuables, jewelry, piercings at home. Please bring any specially ordered equipment (sling, braces) if indicated.    Arrange for a responsible person to drive you to and from the hospital on the day of your surgery. Please confirm the visitor policy for the day of your procedure when you receive your phone call with an arrival time.     Call the surgeon's office with any new illnesses, exposures, or additional questions prior to surgery.    Please reference your “My Surgical Experience Booklet” for additional information to prepare for your upcoming surgery.

## 2024-08-07 NOTE — TELEPHONE ENCOUNTER
Incoming call:    Patient having surgery next Wednesday, 8/14/24    Would like to know when Dr. Gil would like her to stop Eliquis and Aspirin.    Routing.    Call back: [phone]

## 2024-08-07 NOTE — TELEPHONE ENCOUNTER
Pre Admission Testing Department called as patient has upcomming surgery on August 14th to remove hardware and go under general anesthesia. They are requesting our office to contact the patient, prescribe her Eliquis and provide medication instructions. Please advise

## 2024-08-09 ENCOUNTER — OFFICE VISIT (OUTPATIENT)
Dept: OBGYN CLINIC | Facility: CLINIC | Age: 79
End: 2024-08-09

## 2024-08-09 ENCOUNTER — HOSPITAL ENCOUNTER (OUTPATIENT)
Dept: RADIOLOGY | Facility: HOSPITAL | Age: 79
End: 2024-08-09
Attending: STUDENT IN AN ORGANIZED HEALTH CARE EDUCATION/TRAINING PROGRAM
Payer: COMMERCIAL

## 2024-08-09 VITALS — BODY MASS INDEX: 28.45 KG/M2 | HEIGHT: 66 IN | WEIGHT: 177 LBS

## 2024-08-09 DIAGNOSIS — S52.502A CLOSED FRACTURE OF DISTAL END OF LEFT RADIUS, UNSPECIFIED FRACTURE MORPHOLOGY, INITIAL ENCOUNTER: Primary | ICD-10-CM

## 2024-08-09 DIAGNOSIS — S52.502A CLOSED FRACTURE OF DISTAL END OF LEFT RADIUS, UNSPECIFIED FRACTURE MORPHOLOGY, INITIAL ENCOUNTER: ICD-10-CM

## 2024-08-09 PROCEDURE — 99024 POSTOP FOLLOW-UP VISIT: CPT | Performed by: STUDENT IN AN ORGANIZED HEALTH CARE EDUCATION/TRAINING PROGRAM

## 2024-08-09 PROCEDURE — 73110 X-RAY EXAM OF WRIST: CPT

## 2024-08-09 NOTE — PROGRESS NOTES
Assessment/Plan:  Patient ID: 78 y.o. female   Surgery: Open Reduction W/ Internal Fixation (orif) Radius  (wrist) - Left and Release Carpal Tunnel Endoscopic - Left  Date of Surgery: 6/26/2024    The patient is doing well postoperatively. X-rays were reviewed in the office today which demonstrate healing. We will proceed with hardware removal as scheduled on Wednesday. The patient was advised to continue with her Eliquis.    Follow Up:  After surgery    To Do Next Visit:  Splint off and X-rays of the  left  wrist with 20 degree elevated lateral view      CHIEF COMPLAINT:  Chief Complaint   Patient presents with   • Left Wrist - Post-op         SUBJECTIVE:  Patient is a 78 y.o. year old female who presents for follow up after Open Reduction W/ Internal Fixation (orif) Radius  (wrist) - Left and Release Carpal Tunnel Endoscopic - Left. Today patient notes continued pain. The patient states she is on Eliquis secondary to a DVT after knee replacement. She states her prescribing physician did not get back to her yet so she discontinued the Eliquis yesterday.      PHYSICAL EXAMINATION:  General: Well developed and well nourished, alert and oriented x 3, appears comfortable  Psychiatric: Normal    MUSCULOSKELETAL EXAMINATION:  Incision: Clean, dry, intact  Surgery Site: normal, no evidence of infection   Range of Motion: loose fist  Supination 60  Pronation 80  Neurovascular status: Neuro intact, good cap refill         STUDIES REVIEWED:  Xrays of the left wrist were reviewed and independently interpreted in PACS by Dr. Smith and demonstrate healing left distal radius fracture with stable hardware.        PROCEDURES PERFORMED:  Procedures  No Procedures performed today    Scribe Attestation    I,:  Marcy Amaya MA am acting as a scribe while in the presence of the attending physician.:       I,:  Rick Smith MD personally performed the services described in this documentation    as scribed in my presence.:

## 2024-08-13 NOTE — ANESTHESIA PREPROCEDURE EVALUATION
Procedure:  REMOVAL HARDWARE LEFT WRIST (Left: Wrist)    Relevant Problems   CARDIO   (+) Acute deep vein thrombosis (DVT) of distal vein of right lower extremity (HCC)   (+) Atrial flutter, paroxysmal (HCC)   (+) Hyperlipidemia   (+) PVC's (premature ventricular contractions)   (+) Pulmonary embolism, bilateral (HCC)      GI/HEPATIC   (+) GERD (gastroesophageal reflux disease)      MUSCULOSKELETAL   (+) Localized primary osteoarthritis of carpometacarpal (CMC) joint of right wrist   (+) Primary osteoarthritis of right hand   (+) Unilateral primary osteoarthritis, right knee        Physical Exam    Airway    Mallampati score: II  TM Distance: >3 FB  Neck ROM: full     Dental       Cardiovascular  Cardiovascular exam normal    Pulmonary  Pulmonary exam normal     Other Findings  post-pubertal.      Anesthesia Plan  ASA Score- 2     Anesthesia Type- regional with ASA Monitors.         Additional Monitors:     Airway Plan:     Comment: Supraclavicular block. Possible LMA .       Plan Factors-Exercise tolerance (METS): >4 METS.    Chart reviewed. EKG reviewed.  Existing labs reviewed. Patient summary reviewed.    Patient is not a current smoker. Patient not instructed to abstain from smoking on day of procedure. Patient did not smoke on day of surgery.            Induction-     Postoperative Plan-     Perioperative Resuscitation Plan - Level 1 - Full Code.       Informed Consent- Anesthetic plan and risks discussed with patient.  I personally reviewed this patient with the CRNA. Discussed and agreed on the Anesthesia Plan with the CRNA..        Lab Results   Component Value Date    HGBA1C 6.0 (H) 01/02/2024       Lab Results   Component Value Date    K 4.4 06/19/2024     06/19/2024    CO2 29 06/19/2024    BUN 20 06/19/2024    CREATININE 0.81 06/19/2024    GLUF 99 02/14/2024    CALCIUM 9.4 06/19/2024    CORRECTEDCA 9.4 09/28/2021    AST 17 06/19/2024    ALT 15 06/19/2024    ALKPHOS 77 06/19/2024    EGFR 69  06/19/2024       Lab Results   Component Value Date    WBC 8.69 06/19/2024    HGB 14.2 06/19/2024    HCT 44.8 06/19/2024    MCV 91 06/19/2024     06/19/2024   Echo 2024     Left Ventricle: Wall thickness is mildly increased. The left ventricular ejection fraction is 65% by visual estimation. Systolic function is normal. Wall motion is normal. Diastolic function is mildly abnormal, consistent with grade I (abnormal) relaxation.    Right Ventricle: Right ventricular cavity size is normal. Systolic function is normal.    Aortic Valve: There is aortic valve sclerosis.    Mitral Valve: There is mild annular calcification.    Pulmonic Valve: There is mild regurgitation.    Pericardium: There is no pericardial effusion.    Technically difficult and limited  study.    Normal sinus rhythm  Lateral infarct (cited on or before 19-JUN-2024)  Abnormal ECG  When compared with ECG of 19-MAR-2024 10:57,  Sinus rhythm has replaced Atrial fibrillation  Vent. rate has decreased BY  60 BPM  Lateral infarct is now Present  Confirmed by Tanisha Ojeda ([id number]) on 6/20/2024 8:34:36 AM      Specimen Collected: 06/19/24 17:46

## 2024-08-14 ENCOUNTER — HOSPITAL ENCOUNTER (OUTPATIENT)
Facility: AMBULARY SURGERY CENTER | Age: 79
Setting detail: OUTPATIENT SURGERY
Discharge: HOME | End: 2024-08-14
Attending: STUDENT IN AN ORGANIZED HEALTH CARE EDUCATION/TRAINING PROGRAM | Admitting: STUDENT IN AN ORGANIZED HEALTH CARE EDUCATION/TRAINING PROGRAM
Payer: COMMERCIAL

## 2024-08-14 ENCOUNTER — APPOINTMENT (OUTPATIENT)
Dept: RADIOLOGY | Facility: AMBULARY SURGERY CENTER | Age: 79
End: 2024-08-14
Payer: COMMERCIAL

## 2024-08-14 ENCOUNTER — TELEPHONE (OUTPATIENT)
Age: 79
End: 2024-08-14

## 2024-08-14 ENCOUNTER — ANESTHESIA (OUTPATIENT)
Dept: PERIOP | Facility: AMBULARY SURGERY CENTER | Age: 79
End: 2024-08-14
Payer: COMMERCIAL

## 2024-08-14 VITALS
TEMPERATURE: 96.8 F | RESPIRATION RATE: 17 BRPM | HEART RATE: 70 BPM | BODY MASS INDEX: 28.41 KG/M2 | OXYGEN SATURATION: 92 % | WEIGHT: 176 LBS | DIASTOLIC BLOOD PRESSURE: 71 MMHG | SYSTOLIC BLOOD PRESSURE: 130 MMHG

## 2024-08-14 DIAGNOSIS — S52.502A CLOSED FRACTURE OF DISTAL END OF LEFT RADIUS, UNSPECIFIED FRACTURE MORPHOLOGY, INITIAL ENCOUNTER: Primary | ICD-10-CM

## 2024-08-14 DIAGNOSIS — S52.502A DISTAL RADIUS FRACTURE, LEFT: ICD-10-CM

## 2024-08-14 PROCEDURE — C1713 ANCHOR/SCREW BN/BN,TIS/BN: HCPCS | Performed by: STUDENT IN AN ORGANIZED HEALTH CARE EDUCATION/TRAINING PROGRAM

## 2024-08-14 PROCEDURE — 20680 REMOVAL OF IMPLANT DEEP: CPT | Performed by: PHYSICIAN ASSISTANT

## 2024-08-14 PROCEDURE — 20680 REMOVAL OF IMPLANT DEEP: CPT | Performed by: STUDENT IN AN ORGANIZED HEALTH CARE EDUCATION/TRAINING PROGRAM

## 2024-08-14 PROCEDURE — 73110 X-RAY EXAM OF WRIST: CPT

## 2024-08-14 RX ORDER — CEFAZOLIN SODIUM 2 G/50ML
2000 SOLUTION INTRAVENOUS ONCE
Status: COMPLETED | OUTPATIENT
Start: 2024-08-14 | End: 2024-08-14

## 2024-08-14 RX ORDER — ROPIVACAINE HYDROCHLORIDE 2 MG/ML
INJECTION, SOLUTION EPIDURAL; INFILTRATION; PERINEURAL
Status: COMPLETED | OUTPATIENT
Start: 2024-08-14 | End: 2024-08-14

## 2024-08-14 RX ORDER — LIDOCAINE HYDROCHLORIDE 20 MG/ML
INJECTION, SOLUTION EPIDURAL; INFILTRATION; INTRACAUDAL; PERINEURAL AS NEEDED
Status: DISCONTINUED | OUTPATIENT
Start: 2024-08-14 | End: 2024-08-14

## 2024-08-14 RX ORDER — MIDAZOLAM HYDROCHLORIDE 2 MG/2ML
INJECTION, SOLUTION INTRAMUSCULAR; INTRAVENOUS AS NEEDED
Status: DISCONTINUED | OUTPATIENT
Start: 2024-08-14 | End: 2024-08-14

## 2024-08-14 RX ORDER — SODIUM CHLORIDE, SODIUM LACTATE, POTASSIUM CHLORIDE, CALCIUM CHLORIDE 600; 310; 30; 20 MG/100ML; MG/100ML; MG/100ML; MG/100ML
50 INJECTION, SOLUTION INTRAVENOUS CONTINUOUS
Status: DISCONTINUED | OUTPATIENT
Start: 2024-08-14 | End: 2024-08-14 | Stop reason: HOSPADM

## 2024-08-14 RX ORDER — EPHEDRINE SULFATE 50 MG/ML
INJECTION INTRAVENOUS AS NEEDED
Status: DISCONTINUED | OUTPATIENT
Start: 2024-08-14 | End: 2024-08-14

## 2024-08-14 RX ORDER — PROPOFOL 10 MG/ML
INJECTION, EMULSION INTRAVENOUS AS NEEDED
Status: DISCONTINUED | OUTPATIENT
Start: 2024-08-14 | End: 2024-08-14

## 2024-08-14 RX ORDER — OXYCODONE HYDROCHLORIDE 5 MG/1
5 TABLET ORAL EVERY 6 HOURS PRN
Status: DISCONTINUED | OUTPATIENT
Start: 2024-08-14 | End: 2024-08-14 | Stop reason: HOSPADM

## 2024-08-14 RX ORDER — MAGNESIUM HYDROXIDE 1200 MG/15ML
LIQUID ORAL AS NEEDED
Status: DISCONTINUED | OUTPATIENT
Start: 2024-08-14 | End: 2024-08-14 | Stop reason: HOSPADM

## 2024-08-14 RX ORDER — OXYCODONE HYDROCHLORIDE 5 MG/1
5 TABLET ORAL EVERY 6 HOURS PRN
Qty: 8 TABLET | Refills: 0 | Status: SHIPPED | OUTPATIENT
Start: 2024-08-14

## 2024-08-14 RX ORDER — MIDAZOLAM HYDROCHLORIDE 2 MG/2ML
INJECTION, SOLUTION INTRAMUSCULAR; INTRAVENOUS
Status: COMPLETED | OUTPATIENT
Start: 2024-08-14 | End: 2024-08-14

## 2024-08-14 RX ORDER — ONDANSETRON 2 MG/ML
4 INJECTION INTRAMUSCULAR; INTRAVENOUS ONCE AS NEEDED
Status: DISCONTINUED | OUTPATIENT
Start: 2024-08-14 | End: 2024-08-14 | Stop reason: HOSPADM

## 2024-08-14 RX ORDER — ONDANSETRON 2 MG/ML
INJECTION INTRAMUSCULAR; INTRAVENOUS AS NEEDED
Status: DISCONTINUED | OUTPATIENT
Start: 2024-08-14 | End: 2024-08-14

## 2024-08-14 RX ORDER — FENTANYL CITRATE/PF 50 MCG/ML
25 SYRINGE (ML) INJECTION
Status: DISCONTINUED | OUTPATIENT
Start: 2024-08-14 | End: 2024-08-14 | Stop reason: HOSPADM

## 2024-08-14 RX ORDER — ROPIVACAINE HYDROCHLORIDE 5 MG/ML
INJECTION, SOLUTION EPIDURAL; INFILTRATION; PERINEURAL
Status: COMPLETED | OUTPATIENT
Start: 2024-08-14 | End: 2024-08-14

## 2024-08-14 RX ORDER — FENTANYL CITRATE 50 UG/ML
INJECTION, SOLUTION INTRAMUSCULAR; INTRAVENOUS
Status: COMPLETED | OUTPATIENT
Start: 2024-08-14 | End: 2024-08-14

## 2024-08-14 RX ORDER — CHLORHEXIDINE GLUCONATE ORAL RINSE 1.2 MG/ML
15 SOLUTION DENTAL ONCE
Status: DISCONTINUED | OUTPATIENT
Start: 2024-08-14 | End: 2024-08-14 | Stop reason: HOSPADM

## 2024-08-14 RX ADMIN — FENTANYL CITRATE 50 MCG: 50 INJECTION INTRAMUSCULAR; INTRAVENOUS at 07:18

## 2024-08-14 RX ADMIN — SODIUM CHLORIDE, SODIUM LACTATE, POTASSIUM CHLORIDE, AND CALCIUM CHLORIDE: .6; .31; .03; .02 INJECTION, SOLUTION INTRAVENOUS at 07:21

## 2024-08-14 RX ADMIN — ROPIVACAINE HYDROCHLORIDE 15 ML: 5 INJECTION EPIDURAL; INFILTRATION; PERINEURAL at 07:20

## 2024-08-14 RX ADMIN — LIDOCAINE HYDROCHLORIDE 100 MG: 20 INJECTION, SOLUTION EPIDURAL; INFILTRATION; INTRACAUDAL at 07:31

## 2024-08-14 RX ADMIN — ROPIVACAINE HYDROCHLORIDE 10 ML: 2 INJECTION EPIDURAL; INFILTRATION; PERINEURAL at 07:20

## 2024-08-14 RX ADMIN — MIDAZOLAM 1 MG: 1 INJECTION INTRAMUSCULAR; INTRAVENOUS at 07:27

## 2024-08-14 RX ADMIN — PROPOFOL 40 MCG/KG/MIN: 10 INJECTION, EMULSION INTRAVENOUS at 07:36

## 2024-08-14 RX ADMIN — EPHEDRINE SULFATE 10 MG: 50 INJECTION INTRAVENOUS at 07:36

## 2024-08-14 RX ADMIN — CEFAZOLIN SODIUM 2000 MG: 2 SOLUTION INTRAVENOUS at 07:27

## 2024-08-14 RX ADMIN — PROPOFOL 120 MG: 10 INJECTION, EMULSION INTRAVENOUS at 07:31

## 2024-08-14 RX ADMIN — EPHEDRINE SULFATE 5 MG: 50 INJECTION INTRAVENOUS at 07:46

## 2024-08-14 RX ADMIN — EPHEDRINE SULFATE 10 MG: 50 INJECTION INTRAVENOUS at 08:04

## 2024-08-14 RX ADMIN — ONDANSETRON 4 MG: 2 INJECTION INTRAMUSCULAR; INTRAVENOUS at 07:31

## 2024-08-14 RX ADMIN — MIDAZOLAM 1 MG: 1 INJECTION INTRAMUSCULAR; INTRAVENOUS at 07:18

## 2024-08-14 NOTE — DISCHARGE INSTR - AVS FIRST PAGE
Post Operative Instructions    You have had surgery on your arm today, please read and follow the information below:  Elevate your hand above your elbow during the next 24-48 hours to help with swelling.  Place your hand and arm over your head with motion at your shoulder three times a day.  Do not apply any cream/ointment/oil to your incisions including antibiotics (I.e.Neosporin)  Do not use peroxide to clean your wound   Do not soak your hands in standing water (dishwater, tubs, Jacuzzi's, pools, etc.) until given permission (typically 2-3 weeks after injury)    Call the office if you notice any:  Increased numbness or tingling of your hand or fingers that is not relieved with elevation.  Increasing pain that is not controlled with medication.  Difficulty chewing, breathing, swallowing.  Chest pains or shortness of breath.  Fever over 101.4 degrees.    Bandage: Do NOT remove bandage until follow-up appointment.    Motion: Move fingers into a fist 5 times a day, DO NOT move any splinted fingers.    Weight bearing status: Avoid heavy lifting (>2 pounds) with the extremity that was operated on until follow up appointment. Normal activities of daily living are OK.    Ice: Ice for 10 minutes every hour as needed for swelling x 24 hours.    Sling: Please use your sling while your arm is numb from the block. When your arm is FULLY awake again, you no longer need this and may use your sling as needed for comfort. While using the sling, make sure to move your shoulder throughout the day to prevent stiffness here.     Pain medication:   Oxycodone 0.5 - 1 tablet every 6 hours AS NEEDED for pain  *   You may take Tylenol (acetaminophen) in addition to your pain medication. This is over the counter. Please follow the instructions on the bottle. BE AWARE - your pain medication (hydrocodone/oxycodone) may already include acetaminophen in it. If it does, you must include this in your daily amount and make sure not take more than  3000 mg per day.   *   You may take an antiinflammatory medication (i.e. ibuprofen/naproxen) in addition to your pain medication. These are found over the counter, but higher prescription doses are available if needed. Please follow the dosing instructions on the bottle and it is recommended you take these with food. DO NOT take these medications if you are on a blood thinner, have history of gastrointestinal bleeding, chronic kidney disease, or if you have ever been told by a physician not to. You CAN take this with Tylenol (acetaminophen), but should only take ONE antiinflammatory at a time.    Antibiotics:  None     Therapy: An order for hand therapy has been placed in your chart. Please call [phone] at your earliest convenience to get an appointment scheduled for 3-5 days from now.     Follow-up Appointment: 10-14 days.        Please call the office if you have any questions or concerns regarding your post-operative care.

## 2024-08-14 NOTE — OP NOTE
OPERATIVE REPORT  PATIENT NAME: Stormy Hermosillo    :  1945  MRN: [id number]  Pt Location: [facility] OR ROOM 04    SURGERY DATE: 2024     Surgeons and Role:     * Rick Smith MD - Primary     * Vivian Epstein PA-C - Assisting    Physician Assistant need: A physician assistant was required during the procedure for retraction, tissue handling, dissection, and suturing. No qualified resident was available.    Pre-Op Diagnosis Codes:   Retained hardware left wrist    Post-Op Diagnosis Codes:  Retained hardware left wrist    Procedure(s) (LRB):  REMOVAL HARDWARE LEFT WRIST (Left)    Specimen(s):  * No specimens in log *    Estimated Blood Loss:   Minimal    Drains:  None    Implants:  Implant Name Type Inv. Item Serial No.  Lot No. LRB No. Used Action   PLATE WRIST SPANNING LNG ACU-LOC - [id number]  PLATE WRIST SPANNING LNG ACU-LOC  [employer] [id number] Left 1 Explanted   SCREW LCK 3.5 X 12MM HEXALOBE - [id number]  SCREW LCK 3.5 X 12MM HEXALOBE  [employer]  Left 2 Explanted   SCREW LCK HEXALOBE 2.7 X 10MM - [id number]  SCREW LCK HEXALOBE 2.7 X 10MM  [employer]  Left 2 Explanted   SCREW LCK HEXALOBE 2.7 X 12MM - [id number]  SCREW LCK HEXALOBE 2.7 X 12MM  [employer]  Left 1 Explanted   SCREW NON-LCK  3.5 X 12MM HEXALOBE - [id number]  SCREW NON-LCK  3.5 X 12MM HEXALOBE  [employer]  Left 1 Explanted   SCREW NON-LCK  3.5 X 14MM HEXALOBE - [id number]  SCREW NON-LCK  3.5 X 14MM HEXALOBE  [employer]  Left 1 Explanted   SCREW NON-LCK 2.7 X 10MM HEXALOBE - [id number]  SCREW NON-LCK 2.7 X 10MM HEXALOBE  [employer]  Left 1 Explanted   SCREW NON-LCK 2.7 X 20MM HEXALOBE - [id number]  SCREW NON-LCK 2.7 X 20MM HEXALOBE  [employer]  Left 1 Explanted       Anesthesia Type:   Regional with Sedation    Operative Indications:  Patient is a 78 y.o. female that presented with Left distal radius fracture.  We discussed treatment options, namely closed management and operative fixation.  Patient elected for operative management given difficulty that  would be had maintaining reduction in splint, loss of radial height and displacement  We proceeded with dorsal spanning plate given fracture pattern and comminution. Radiographs were performed and demonstrated reasonable amount of healing.  It was felt safe to proceed with hardware removal.  Discussed risks of surgery including pain, bleeding, scarring, stiffness, infection, need further surgeries.  Patient elected to proceed with removal of hardware.     Operative Findings:  Stable healed distal radius fracture after hardware removal  Fracture of second metacarpal through most proximal screw hole noted with callus formation     Complications:   None     Procedure and Technique:  Patient was identified in the preoperative holding area.  Surgical site was marked with patient participation. Regional block was performed by our anesthesia colleagues. Patient was taken back to the operating theater.  Extremity was prepped and draped in typical fashion.  Formal time-out was performed confirming site, patient, procedure.  All present were in agreement.  Surgery began with dorsal forearm approach to the hardware through prior incision.  Dissection was carried down to the hardware.  Four screws were removed proximally. One screw was removed at metaphyseal level.  Attention was taken over the dorsal hand over index metacarpal.  Skin was incised.  Index extensor tendons were identified and protected during entire surgery.  There was periosteum over the hardware, which was sharply incised.  Hardware was exposed.  Four screws were removed from the metacarpal.  Using a Kocher, the dorsal spanning plate was removed.  Manipulation under anesthesia of wrist was performed.  We were able to get the wrist to 40° of flexion and 50° of extension. Fluoroscopic images were taken of the forearm at the level of the screws, which confirmed no fracture.  Radiographs were taken of the wrist level.  Flexion and extension radiographs were  performed confirming no motion at prior fracture site.  The index metacarpal was examined radiographically.  There was noted to be a fracture through the most proximal hole of the index metacarpal.  Callus was noted around this suggesting some degree of chronicity. Prior radiographs were examined intraoperatively.  The radiographs on 7/12/2024 did not show any evidence of index metacarpal fracture.  On 8/9/2024, there was noted to be callus formation.  The metacarpal was flexed and extended under fluoroscopy and there was no motion at the fracture site noted.  Examination dorsally did not show an obvious fracture line.   Decision was made to leave index metacarpal without fixation.  Wounds were thoroughly irrigated.  The periosteum over the index metacarpal was closed with figure-eight 4-0 Vicryl suture.  Deep dermal layer was closed with inverted simple 4-0 Vicryl.  Running 4-0 Monocryl closed the skin.  Exofin glue was applied.  Wound was dressed with Mepilex.  Tourniquet was deflated.  I was present for the entire procedure     Postoperative Plan:  Patient will begin active range of motion of wrist with therapy.       Patient Disposition:  PACU         SIGNATURE: Rick Smith MD  DATE: August 14, 2024  TIME: 8:39 AM

## 2024-08-14 NOTE — INTERVAL H&P NOTE
H&P reviewed. After examining the patient I find no changes in the patients condition since the H&P had been written.    General: Well developed and well nourished, alert & oriented x 3, appears comfortable  Psychiatric: Normal  HEENT: Normocephalic, Atraumatic Trachea Midline, No torticollis  Pulmonary: No audible wheezing or respiratory distress   Abdomen/GI: Non tender, non distended   Cardiovascular: No pitting edema, 2+ radial pulse     Vitals:    08/14/24 0654   BP: (!) 179/103   Pulse: 68   Resp: 18   Temp: (!) 97.2 °F (36.2 °C)   SpO2: 96%

## 2024-08-14 NOTE — ANESTHESIA POSTPROCEDURE EVALUATION
Post-Op Assessment Note    CV Status:  Stable  Pain Score: 0    Pain management: adequate       Mental Status:  Sleepy and arousable   Hydration Status:  Euvolemic and stable   PONV Controlled:  Controlled   Airway Patency:  Patent     Post Op Vitals Reviewed: Yes    No anethesia notable event occurred.    Staff: CRNA               BP   125/68   Temp      Pulse  78   Resp   15   SpO2   92%

## 2024-08-14 NOTE — TELEPHONE ENCOUNTER
Caller: Yumi     Doctor: Sarah    Reason for call: Would like to know when she should schedule her PT  - Had hardware removal from her wrist this morning    Call back#: [phone]

## 2024-08-19 ENCOUNTER — EVALUATION (OUTPATIENT)
Dept: OCCUPATIONAL THERAPY | Facility: CLINIC | Age: 79
End: 2024-08-19
Payer: COMMERCIAL

## 2024-08-19 DIAGNOSIS — S52.502A CLOSED FRACTURE OF DISTAL END OF LEFT RADIUS, UNSPECIFIED FRACTURE MORPHOLOGY, INITIAL ENCOUNTER: Primary | ICD-10-CM

## 2024-08-19 PROCEDURE — 97110 THERAPEUTIC EXERCISES: CPT

## 2024-08-19 PROCEDURE — 97165 OT EVAL LOW COMPLEX 30 MIN: CPT

## 2024-08-19 NOTE — PROGRESS NOTES
OT Evaluation     Today's date: 2024  Patient name: Stormy Hermosillo  : 1945  MRN: [id number]  Referring provider: Vivian Epstein PA-C  Dx:   Encounter Diagnosis     ICD-10-CM    1. Closed fracture of distal end of left radius, unspecified fracture morphology, initial encounter  S52.502A Ambulatory Referral to PT/OT Hand Therapy                     Assessment  Impairments: abnormal or restricted ROM, activity intolerance, impaired physical strength, lacks appropriate home exercise program, pain with function and weight-bearing intolerance    Assessment details: Patient presents to initial evaluation s/p ORIF of the distal radius w/ a dorsal spanning plate following a radius fracture. Patient initial injury occurred on 24 as the result of a fall on their outstretched hand. Patient surgery took place on 24. Patient was placed in a pre-fabricated wrist brace following surgery. Patient had their dorsal spanning plate removed on 24. Patient had their most recent follow up with orthopedics on 24. Therapist removed ACE and webril off but left underlying Mepilex in place as per orthopedics. Therapist encouraged patient to continue to wear their pre-fabricated wrist brace until they are cleared by orthopedics. Patient reported understanding. Patient presents with decreased ROM in all planes of motion of the wrist and forearm as anticipated following a dorsal spanning plate. Patient is dionna to form a loose composite fist at this time. Pain is reported to be moderate in intensity during general ROM. Edema is present at the wrist crease and across the dorsal hand. Patient reports numbness or tinging in the dorsal thumb and first web space. Therapist deferred /pinch strength secondary to healing timeline. Will access once cleared by orthopedics. Patient was provided a custom HEP to address wrist, forearm, and digit AROM. See below for a more detailed assessment.   Understanding of  "Dx/Px/POC: good     Prognosis: good    Goals  STG:    Patient will increase wrist ROM in all planes by an arch of motion of >30 degrees in 4 weeks    Patient will increase supination by >20 degrees in 4 weeks    Patient will report decreased pain by 1-2 grades in the wrist during functional movement in 4 weeks    Patient will decrease edema at the wrist crease by 1-3 mm in 4 weeks        LTG:    Patient will display ROM WFL in the wrist in 8 weeks or discharge    Patient will display forearm AROM WFL in 8 weeks or discharge    Patient will report resolution of pain in the wrist during all activities in 8 weeks    Patient will display resolution of edema at the wrist crease in 8 weeks or discharge    Patient will increase FOTO score by >20 points in 8 weeks or discharge    Plan  Patient would benefit from: custom splinting and OT eval  Referral necessary: No  Planned modality interventions: TENS and thermotherapy: hydrocollator packs    Planned therapy interventions: activity modification, functional ROM exercises, graded activity, graded exercise, home exercise program, prosthetic fitting/training, patient education, strengthening, stretching, therapeutic activities and therapeutic exercise    Frequency: 2x week  Duration in weeks: 10  Plan of Care beginning date: 8/19/2024  Plan of Care expiration date: 10/19/2024  Treatment plan discussed with: patient  Plan details: Patient would benefit from skilled OP OT hand therapy services 2x per week for 8 weeks to increase ROM and return to full functional status.         Subjective Evaluation    History of Present Illness  Date of surgery: 6/26/2024  Mechanism of injury: surgery  Mechanism of injury: S/P ORIF w/ Dorsal Spanning plate  DOI: 6/19/24  DOS: 6/26/24  Hardware removed: 8/14/24    Subjective:  \"I was walking from a curb up onto a grass brace and I fell backwards\". \"It was the only time I fell this year\". \"My arm just aches\". \"The back of my thumb and in between " "my thumb and index finger is numb\". \"\"I'm right handed so that has helped\". \"I have to set my toothbrush down to brush my teeth\". \"Sometimes when I move my hand it feels like it catches\". \"I know I can't lift for awhile\".           Not a recurrent problem   Quality of life: good    Patient Goals  Patient goals for therapy: decreased edema, decreased pain, increased motion, increased strength and independence with ADLs/IADLs    Pain  Current pain ratin  At best pain ratin  At worst pain ratin  Quality: discomfort and sharp  Relieving factors: rest and support  Exacerbated by: general motion.  Progression: improved    Social Support    Employment status: not working  Hand dominance: right      Diagnostic Tests  X-ray: abnormal  Treatments  Previous treatment: immobilization  Current treatment: occupational therapy        Objective     Observations     Left Wrist/Hand   Positive for edema.     Tenderness     Left Wrist/Hand   No tenderness in the distal radioulnar joint and scaphoid.     Neurological Testing     Sensation     Wrist/Hand   Left   Intact: light touch    Right   Intact: light touch    Comments   Left light touch: numbness on dorsal thumb and 1st web space     Active Range of Motion     Left Elbow   Forearm supination: 65 degrees   Forearm pronation: 75 degrees     Right Elbow   Forearm supination: 75 degrees   Forearm pronation: 85 degrees     Left Wrist   Wrist flexion: 25 degrees   Wrist extension: 35 degrees   Radial deviation: 5 degrees   Ulnar deviation: 5 degrees     Right Wrist   Wrist flexion: 64 degrees   Wrist extension: 65 degrees   Radial deviation: 15 degrees   Ulnar deviation: 30 degrees     Left Thumb   Kapandji score: 9 degrees      Right Thumb   Kapandji score: 10 degrees    Additional Active Range of Motion Details  B/L Full composite fist    Strength/Myotome Testing     Additional Strength Details  Deferred secondary to healing timeline. Will access as able.     Swelling "     Left Wrist/Hand   Circumference MCP: 20.4 cm  Circumference wrist: 19.4 cm    Right Wrist/Hand   Circumference MCP: 19.6 cm  Circumference wrist: 17 cm             Precautions: S/P ORIF w/ Dorsal Spanning plate  DOI: 6/19/24  DOS: 6/26/24  Hardware removed: 8/14/24      Manuals 8/19            [facility]                                       Neuro Re-Ed                                                                                                        Ther Ex             HEP Wrist AROM all planes    Forearm AROM    Tendon glides            Wrist AROM             Med ball rolls             Wrist Maze             Dex balls             Hook fist pegs             Sup/pro w/ dowel                                                    Ther Activity             Keypegs             Colored pegs                                                    Modalities             P

## 2024-08-22 ENCOUNTER — OFFICE VISIT (OUTPATIENT)
Dept: OCCUPATIONAL THERAPY | Facility: CLINIC | Age: 79
End: 2024-08-22
Payer: COMMERCIAL

## 2024-08-22 DIAGNOSIS — S52.502A CLOSED FRACTURE OF DISTAL END OF LEFT RADIUS, UNSPECIFIED FRACTURE MORPHOLOGY, INITIAL ENCOUNTER: Primary | ICD-10-CM

## 2024-08-22 PROCEDURE — 97110 THERAPEUTIC EXERCISES: CPT | Performed by: OCCUPATIONAL THERAPIST

## 2024-08-22 NOTE — PROGRESS NOTES
"Daily Note     Today's date: 2024  Patient name: Stormy Hermosillo  : 1945  MRN: [id number]  Referring provider: Vivian Epstein PA-C  Dx:   Encounter Diagnosis     ICD-10-CM    1. Closed fracture of distal end of left radius, unspecified fracture morphology, initial encounter  S52.502A           Start Time: 0800  Stop Time: 0840  Total time in clinic (min): 40 minutes    Subjective: \"I feel some pulling\" during wrist flexion on the dorsal hand       Objective: See treatment diary below.   WE=47  WF=30    Assessment: Patient has been wearing tubigrip with elastic ace wrap on top. Appeared to be causing edema in digits. Educated to only wear tubigrip and discontinue use of ace wrap. Very good digit and thumb motion. Improvement in wrist flexion and extension from the initial evaluation. Reports numbness in the DRSN distribution.       Plan: Continue per plan of care.      Precautions: S/P ORIF w/ Dorsal Spanning plate  DOI: 24  DOS: 24  Hardware removed: 24      Manuals            [facility]                                       Neuro Re-Ed                                                                                                        Ther Ex             HEP Wrist AROM all planes    Forearm AROM    Tendon glides            Wrist AROM  3'           Med ball rolls  2'           Wrist Maze  5x           Dex balls  2'           Hook fist pegs             Sup/pro w/ dowel                                                    Ther Activity             Keypegs  1x alternating digits            Colored pegs             [place] translation   1x                                     Modalities             MHP  5'                             "

## 2024-08-23 ENCOUNTER — OFFICE VISIT (OUTPATIENT)
Dept: OBGYN CLINIC | Facility: CLINIC | Age: 79
End: 2024-08-23

## 2024-08-23 ENCOUNTER — HOSPITAL ENCOUNTER (OUTPATIENT)
Dept: RADIOLOGY | Facility: HOSPITAL | Age: 79
End: 2024-08-23
Attending: STUDENT IN AN ORGANIZED HEALTH CARE EDUCATION/TRAINING PROGRAM
Payer: COMMERCIAL

## 2024-08-23 VITALS — HEIGHT: 66 IN | BODY MASS INDEX: 28.28 KG/M2 | WEIGHT: 176 LBS

## 2024-08-23 DIAGNOSIS — S52.502A CLOSED FRACTURE OF DISTAL END OF LEFT RADIUS, UNSPECIFIED FRACTURE MORPHOLOGY, INITIAL ENCOUNTER: ICD-10-CM

## 2024-08-23 DIAGNOSIS — S52.502A CLOSED FRACTURE OF DISTAL END OF LEFT RADIUS, UNSPECIFIED FRACTURE MORPHOLOGY, INITIAL ENCOUNTER: Primary | ICD-10-CM

## 2024-08-23 PROCEDURE — 99024 POSTOP FOLLOW-UP VISIT: CPT | Performed by: STUDENT IN AN ORGANIZED HEALTH CARE EDUCATION/TRAINING PROGRAM

## 2024-08-23 PROCEDURE — 73110 X-RAY EXAM OF WRIST: CPT

## 2024-08-23 NOTE — PROGRESS NOTES
Assessment/Plan:  Patient ID: 78 y.o. female   Surgery: Removal Hardware Left Wrist - Left  Date of Surgery: 8/14/2024    9 days s/p above surgery. X-rays were performed in the office and reviewed. She may shower normally. She will continue OT. We discussed she will likely lose approx. 20 percent of her previous wrist motion. She may gradually resume activities to her tolerance. Numbness to the dorsal radial sensory nerve branch should continue to improve with time.     Follow Up:  4 weeks    To Do Next Visit:  X-rays of the  left  wrist with 20 degree elevated lateral view  X-ray left hand       CHIEF COMPLAINT:  Chief Complaint   Patient presents with   • Left Wrist - Post-op         SUBJECTIVE:  Patient is a 78 y.o. year old female who presents for follow up after Removal Hardware Left Wrist - Left. Today patient states numbness to the dorsal aspect of thumb and radial dorsal aspect of the index finger. She started OT.      PHYSICAL EXAMINATION:  General: Well developed and well nourished, alert and oriented x 3, appears comfortable  Psychiatric: Normal    MUSCULOSKELETAL EXAMINATION:  Incision: Clean, dry, intact  Surgery Site: normal, no evidence of infection   Range of Motion: As expected  Neurovascular status: Neuro intact, good cap refill  Composite fist   Full digital extension   Wrist flexion 35  Wrist extension 45   Supination 70   Pronation 80    STUDIES REVIEWED:  Xrays of the left wrist were reviewed and independently interpreted in PACS by Dr. Smith and demonstrate a healing distal radius fracture s/p removal of hardware and a healing index finger metacarpal fracture with callus formation.         PROCEDURES PERFORMED:  Procedures  No Procedures performed today    Scribe Attestation    I,:  Zoila Amaya MA am acting as a scribe while in the presence of the attending physician.:       I,:  Rick Smith MD personally performed the services described in this documentation    as scribed in  my presence.:

## 2024-08-29 ENCOUNTER — OFFICE VISIT (OUTPATIENT)
Dept: OCCUPATIONAL THERAPY | Facility: CLINIC | Age: 79
End: 2024-08-29
Payer: COMMERCIAL

## 2024-08-29 DIAGNOSIS — S52.502A CLOSED FRACTURE OF DISTAL END OF LEFT RADIUS, UNSPECIFIED FRACTURE MORPHOLOGY, INITIAL ENCOUNTER: Primary | ICD-10-CM

## 2024-08-29 PROCEDURE — 97110 THERAPEUTIC EXERCISES: CPT | Performed by: OCCUPATIONAL THERAPIST

## 2024-08-29 PROCEDURE — 97140 MANUAL THERAPY 1/> REGIONS: CPT | Performed by: OCCUPATIONAL THERAPIST

## 2024-08-29 NOTE — PROGRESS NOTES
"Daily Note     Today's date: 2024  Patient name: Stormy Hermosillo  : 1945  MRN: [id number]  Referring provider: Vivian Epstein PA-C  Dx:   Encounter Diagnosis     ICD-10-CM    1. Closed fracture of distal end of left radius, unspecified fracture morphology, initial encounter  S52.502A             Start Time: 0845  Stop Time: 09  Total time in clinic (min): 45 minutes    Subjective: \"I feel more pain in the hand\"      Objective: See treatment diary below.   WE=53 (was 46)  WF=40 (was 30)    Assessment: Demonstrating improvement in wrist extension and flexion. Pain in the hand due to extrinsic tightness and scar tissue adhesion from spanning plate.       Plan: Continue per plan of care. Progress with gentle strengthening and AAROM/PROM.      Precautions: S/P ORIF w/ Dorsal Spanning plate;blood thinners     DOS: 24  Hardware removed: 24      Manuals           STM   5'          MEM                                       Neuro Re-Ed                                                                                                        Ther Ex             HEP Wrist AROM all planes    Forearm AROM    Tendon glides            Wrist AROM  3'           Wrist AAROM   5'          Med ball rolls  2' 2'          Wrist Maze  5x 5x          Dex balls  2' 2'          Hook fist pegs             Sup/pro w/ dowel             Flex bar   Yellow 15x F/E          Digiflex   Yellow isolated 10x          Thumb flexion in web   Red, 30x          Ther Activity             Keypegs  1x alternating digits  1x alternating digits           Colored pegs             [place] translation   1x 1x          Putty press             Gripping    RPW center 30x           Modalities             MHP  5' 5'                            "

## 2024-09-03 ENCOUNTER — OFFICE VISIT (OUTPATIENT)
Dept: OCCUPATIONAL THERAPY | Facility: CLINIC | Age: 79
End: 2024-09-03
Payer: COMMERCIAL

## 2024-09-03 DIAGNOSIS — S52.502A CLOSED FRACTURE OF DISTAL END OF LEFT RADIUS, UNSPECIFIED FRACTURE MORPHOLOGY, INITIAL ENCOUNTER: Primary | ICD-10-CM

## 2024-09-03 PROCEDURE — 97530 THERAPEUTIC ACTIVITIES: CPT

## 2024-09-03 PROCEDURE — 97110 THERAPEUTIC EXERCISES: CPT

## 2024-09-03 NOTE — PROGRESS NOTES
"Daily Note     Today's date: 9/3/2024  Patient name: Stormy Hermosillo  : 1945  MRN: [id number]  Referring provider: Vivian Epstein PA-C  Dx:   Encounter Diagnosis     ICD-10-CM    1. Closed fracture of distal end of left radius, unspecified fracture morphology, initial encounter  S52.502A               Start Time: 08  Stop Time: 942  Total time in clinic (min): 47 minutes    Subjective: \"It might be getting a little bit better, but it's still so tight in the hand.\"      Objective: See treatment diary below.       Assessment: Demonstrating improvement in wrist extension and flexion. Some nerve pain noted at start of session. STM performed for edema and symptom management prior to exercises. Reviewed importance of scar management multiple times daily to decrease adhesions and reduce pain/tightness. Patient tolerated new strengthening exercises/activities this date with some noted difficulty. She was instructed to take rest breaks as needed.       Plan: Continue per plan of care. Progress with gentle strengthening and PROM.        Precautions: S/P ORIF w/ Dorsal Spanning plate;blood thinners     DOS: 24  Hardware removed: 24    Manuals 8/19 8/22 8/29 9/3         STM   5' 5'         MEM    3'                                   Neuro Re-Ed                                                                                                        Ther Ex             HEP Wrist AROM all planes    Forearm AROM    Tendon glides            Wrist AROM  3'           Wrist AAROM   5' 5'         Med ball rolls  2' 2'          Wrist Maze  5x 5x 5x         Dex balls  2' 2' 2'         Hook fist pegs             Sup/pro w/ dowel             Flex bar   Yellow 15x F/E Yellow 15x F/E, 10x up/down         Digiflex   Yellow isolated 10x Y isol 10x, R comp 15x         Thumb flexion in web   Red, 30x          Ther Activity             Keypegs  1x alternating digits  1x alternating digits  1x alternating digits        "   Colored pegs             [place] translation   1x 1x 1x         Putty press             Gripping    RPW center 30x  RPW center 30x          Pinching    Y/R on rim 1x         Modalities             P  5' 5' 5'

## 2024-09-05 ENCOUNTER — OFFICE VISIT (OUTPATIENT)
Dept: OCCUPATIONAL THERAPY | Facility: CLINIC | Age: 79
End: 2024-09-05
Payer: COMMERCIAL

## 2024-09-05 DIAGNOSIS — S52.502A CLOSED FRACTURE OF DISTAL END OF LEFT RADIUS, UNSPECIFIED FRACTURE MORPHOLOGY, INITIAL ENCOUNTER: Primary | ICD-10-CM

## 2024-09-05 PROCEDURE — 97110 THERAPEUTIC EXERCISES: CPT

## 2024-09-05 PROCEDURE — 97530 THERAPEUTIC ACTIVITIES: CPT

## 2024-09-05 PROCEDURE — 97140 MANUAL THERAPY 1/> REGIONS: CPT

## 2024-09-05 NOTE — PROGRESS NOTES
Daily Note     Today's date: 2024  Patient name: Stormy Hermosillo  : 1945  MRN: [id number]  Referring provider: Vivian Epstein PA-C  Dx:   Encounter Diagnosis     ICD-10-CM    1. Closed fracture of distal end of left radius, unspecified fracture morphology, initial encounter  S52.502A                          Subjective: I think I'm not going to come much more. I'm so discouraged.      Objective: See treatment diary below.   Active Range of Motion   Left Elbow   Forearm supination: 65 degrees now 85  Forearm pronation: 75 degrees now 80    Left Wrist   Wrist flexion: 25 degrees now 45  Wrist extension: 35 degrees now 55  Radial deviation: 5 degrees now 18  Ulnar deviation: 5 degrees now 20    Swelling   Left Wrist/Hand   Circumference MCP: 20.4 cm now 19.7  Circumference wrist: 19.4 cm now 19.1     Right Wrist/Hand   Circumference MCP: 19.6 cm  Circumference wrist: 17 cm    Assessment: Pt made good progress with ROM in all areas, as well as decreased hand and wrist swelling. Began gentle PROM, and progressed strengthening. Pt was challenged with resistive pinch, but overall tolerated well. Discussed frequency of HEP; due to high copay pt wants to decrease to 1x/week until dr f/u.       Plan: Continue per plan of care.         Precautions: S/P ORIF w/ Dorsal Spanning plate;blood thinners     DOS: 24  Hardware removed: 24    Manuals 8/19 8/22 8/29 9/3 9        STM   5' 5' 8' STM & scar massage        MEM    3'                                   Neuro Re-Ed                                                                                                        Ther Ex             HEP Wrist AROM all planes    Forearm AROM    Tendon glides            Wrist AROM  3'           Wrist AAROM   5' 5' PROM 5'        Med ball rolls  2' 2'          Wrist Maze  5x 5x 5x 5x        Dex balls  2' 2' 2' 2'        Hook fist pegs             Sup/pro w/ dowel             Flex bar   Yellow 15x F/E Yellow 15x F/E,  10x up/down YFB 2x10 F/E/S/P        Digiflex   Yellow isolated 10x Y isol 10x, R comp 15x Y isol 15x    R comp 20x        Thumb flexion in web   Red, 30x          Ther Activity             Keypegs  1x alternating digits  1x alternating digits  1x alternating digits  1x opp        Colored pegs             [place] translation   1x 1x 1x         Putty press             Gripping    RPW center 30x  RPW center 30x  RPW center 20x        Pinching    Y/R on rim 1x RRG on rim        Modalities             MHP  5' 5' 5' 5'

## 2024-09-10 ENCOUNTER — APPOINTMENT (OUTPATIENT)
Dept: OCCUPATIONAL THERAPY | Facility: CLINIC | Age: 79
End: 2024-09-10
Payer: COMMERCIAL

## 2024-09-12 ENCOUNTER — APPOINTMENT (OUTPATIENT)
Dept: OCCUPATIONAL THERAPY | Facility: CLINIC | Age: 79
End: 2024-09-12
Payer: COMMERCIAL

## 2024-09-18 NOTE — PROGRESS NOTES
OT Re-Evaluation     Today's date: 2024  Patient name: Stormy Hermosillo  : 1945  MRN: [id number]  Referring provider: Vivian Epstein PA-C  Dx:   Encounter Diagnosis     ICD-10-CM    1. Closed fracture of distal end of left radius, unspecified fracture morphology, initial encounter  S52.502A                        Assessment  Impairments: abnormal or restricted ROM, activity intolerance, impaired physical strength, lacks appropriate home exercise program, pain with function and weight-bearing intolerance    Assessment details: Patient presents to initial evaluation s/p ORIF of the distal radius w/ a dorsal spanning plate following a radius fracture. Patient initial injury occurred on 24 as the result of a fall on their outstretched hand. Patient surgery took place on 24. Patient was placed in a pre-fabricated wrist brace following surgery. Patient had their dorsal spanning plate removed on 24. She is making very good progress towards her goals. Wrist and forearm ROM is WFL in all planes. She is able to make a full composite fist, achieve opposition to the small finger, and full digital extension. Edema is resolving, and there is mild dorsal scar tissue adhesion. She is experiencing numbness in the DRSN distribution, which has not changed. She is most limited at this time due to strength and endurance deficit, and will benefit from continued therapy for this focus.   Understanding of Dx/Px/POC: good     Prognosis: good    Goals  STG:    Patient will increase wrist ROM in all planes by an arch of motion of >30 degrees in 4 weeks- MET    Patient will increase supination by >20 degrees in 4 weeks- MET    Patient will report decreased pain by 1-2 grades in the wrist during functional movement in 4 weeks- PARTIALLY MET, no consistent pain     Patient will decrease edema at the wrist crease by 1-3 mm in 4 weeks-MET        LTG:    Patient will display ROM WFL in the wrist in 8 weeks or  "discharge-MET    Patient will display forearm AROM WFL in 8 weeks or discharge-MET    Patient will report resolution of pain in the wrist during all activities in 8 weeks- NOT MET    Patient will display resolution of edema at the wrist crease in 8 weeks or discharge- PARTIALLY MET    Patient will increase FOTO score by >20 points in 8 weeks or discharge- MET    Plan  Patient would benefit from: custom splinting  Referral necessary: No  Planned modality interventions: thermotherapy: hydrocollator packs    Planned therapy interventions: activity modification, functional ROM exercises, graded activity, graded exercise, home exercise program, prosthetic fitting/training, patient education, strengthening, stretching, therapeutic activities, therapeutic exercise, IASTM and joint mobilization    Frequency: 2x week  Duration in weeks: 10  Plan of Care beginning date: 9/19/2024  Plan of Care expiration date: 11/19/2024  Treatment plan discussed with: patient  Plan details: Patient would benefit from skilled OP OT hand therapy services 2x per week for 8 weeks to increase ROM and return to full functional status.         Subjective Evaluation    History of Present Illness  Date of onset: 6/19/2024  Date of surgery: 8/14/2024  Mechanism of injury: surgery  Mechanism of injury: S/P ORIF w/ Dorsal Spanning plate  DOI: 6/19/24  DOS: 6/26/24  Hardware removed: 8/14/24    Subjective:  \"I was walking from a curb up onto a grass brace and I fell backwards\". \"It was the only time I fell this year\". \"My arm just aches\". \"The back of my thumb and in between my thumb and index finger is numb\". \"\"I'm right handed so that has helped\". \"I have to set my toothbrush down to brush my teeth\". \"Sometimes when I move my hand it feels like it catches\". \"I know I can't lift for awhile\".           Not a recurrent problem   Quality of life: good    Patient Goals  Patient goals for therapy: decreased edema, decreased pain, increased motion, increased " strength and independence with ADLs/IADLs    Pain  Current pain ratin  At best pain ratin  At worst pain ratin  Location: intermittent pain, dorsal  Quality: discomfort and sharp  Relieving factors: rest and support  Exacerbated by: general motion.  Progression: improved    Social Support    Employment status: not working  Hand dominance: right      Diagnostic Tests  X-ray: abnormal  Treatments  Current treatment: occupational therapy        Objective     Observations     Left Wrist/Hand   Positive for adhesive scar.     Tenderness     Left Wrist/Hand   No tenderness in the distal radioulnar joint and scaphoid.     Neurological Testing     Sensation     Wrist/Hand   Left   Intact: light touch    Right   Intact: light touch    Comments   Left light touch: numbness on dorsal thumb and 1st web space     Active Range of Motion     Left Elbow   Forearm supination: 80 degrees WFL  Forearm pronation: 80 degrees WFL    Right Elbow   Forearm supination: 75 degrees   Forearm pronation: 85 degrees     Left Wrist   Wrist flexion: 47 degrees WFL  Wrist extension: 65 degrees WFL  Radial deviation: 16 degrees WFL  Ulnar deviation: 24 degrees WFL      Right Wrist   Wrist flexion: 64 degrees   Wrist extension: 65 degrees   Radial deviation: 15 degrees   Ulnar deviation: 30 degrees     Left Thumb   Kapandji score: 9 degrees      Additional Active Range of Motion Details  B/L Full composite fist.     Passive Range of Motion     Left Wrist   Wrist flexion: 53 degrees   Wrist extension: 73 degrees     Strength/Myotome Testing     Left Wrist/Hand      (2nd hand position)     Trial 1: 18.7    Thumb Strength  Key/Lateral Pinch     Trial 1: 4.4  Palmar/Three-Point Pinch     Trial 1: 3    Right Wrist/Hand      (2nd hand position)     Trial 1: 31    Thumb Strength   Key/Lateral Pinch     Trial 1: 8.8  Palmar/Three-Point Pinch     Trial 1: 7.9    Swelling     Left Wrist/Hand   Circumference MCP: 19.8 cm  Circumference  wrist: 19.3 cm    Right Wrist/Hand   Circumference MCP: 19.6 cm  Circumference wrist: 17 cm                Precautions: S/P ORIF w/ Dorsal Spanning plate;blood thinners     DOS: 6/26/24  Hardware removed: 8/14/24    Manuals 8/19 8/22 8/29 9/3 9/5 9/19       STM   5' 5' 8' STM & scar massage 8'       MEM    3'                                   Neuro Re-Ed                                                                                                        Ther Ex             HEP Wrist AROM all planes    Forearm AROM    Tendon glides            Re-eval             Wrist AROM  3'           Wrist AAROM   5' 5' PROM 5' 5' PROM        Med ball rolls  2' 2'          Wrist Maze  5x 5x 5x 5x        Dex balls  2' 2' 2' 2'        Ext web      Yellow 20x       Sup/pro w/ dowel             Flex bar   Yellow 15x F/E Yellow 15x F/E, 10x up/down YFB 2x10 F/E/S/P Red al planes 20x       Digiflex   Yellow isolated 10x Y isol 10x, R comp 15x Y isol 15x    R comp 20x Red isolated 10, green comp 20       Thumb flexion in web   Red, 30x          Wrist curls      1# 2x10                    Ther Activity             Keypegs  1x alternating digits  1x alternating digits  1x alternating digits  1x opp        Colored pegs             [place]irk translation   1x 1x 1x         Putty press             Gripping    RPW center 30x  RPW center 30x  RPW center 20x RPW center 20x       Pinching    Y/R on rim 1x RRG on rim RRG x1       Jar turning       In yellow, 10x       Modalities             MHP  5' 5' 5' 5' 5'

## 2024-09-19 ENCOUNTER — EVALUATION (OUTPATIENT)
Dept: OCCUPATIONAL THERAPY | Facility: CLINIC | Age: 79
End: 2024-09-19
Payer: COMMERCIAL

## 2024-09-19 DIAGNOSIS — S52.502A CLOSED FRACTURE OF DISTAL END OF LEFT RADIUS, UNSPECIFIED FRACTURE MORPHOLOGY, INITIAL ENCOUNTER: Primary | ICD-10-CM

## 2024-09-19 PROCEDURE — 97110 THERAPEUTIC EXERCISES: CPT | Performed by: OCCUPATIONAL THERAPIST

## 2024-09-19 PROCEDURE — 97140 MANUAL THERAPY 1/> REGIONS: CPT | Performed by: OCCUPATIONAL THERAPIST

## 2024-09-20 ENCOUNTER — OFFICE VISIT (OUTPATIENT)
Dept: OBGYN CLINIC | Facility: CLINIC | Age: 79
End: 2024-09-20

## 2024-09-20 ENCOUNTER — HOSPITAL ENCOUNTER (OUTPATIENT)
Dept: RADIOLOGY | Facility: HOSPITAL | Age: 79
End: 2024-09-20
Attending: STUDENT IN AN ORGANIZED HEALTH CARE EDUCATION/TRAINING PROGRAM
Payer: COMMERCIAL

## 2024-09-20 VITALS — BODY MASS INDEX: 28.28 KG/M2 | HEIGHT: 66 IN | WEIGHT: 176 LBS

## 2024-09-20 DIAGNOSIS — S52.502A CLOSED FRACTURE OF DISTAL END OF LEFT RADIUS, UNSPECIFIED FRACTURE MORPHOLOGY, INITIAL ENCOUNTER: Primary | ICD-10-CM

## 2024-09-20 DIAGNOSIS — S52.502A CLOSED FRACTURE OF DISTAL END OF LEFT RADIUS, UNSPECIFIED FRACTURE MORPHOLOGY, INITIAL ENCOUNTER: ICD-10-CM

## 2024-09-20 PROCEDURE — 99024 POSTOP FOLLOW-UP VISIT: CPT | Performed by: STUDENT IN AN ORGANIZED HEALTH CARE EDUCATION/TRAINING PROGRAM

## 2024-09-20 PROCEDURE — 73130 X-RAY EXAM OF HAND: CPT

## 2024-09-20 PROCEDURE — 73110 X-RAY EXAM OF WRIST: CPT

## 2024-09-20 NOTE — LETTER
September 21, 2024     Jules Dewey DO  [address]  [address]  [place] [address]    Patient: Stormy Hermosillo   YOB: 1945   Date of Visit: 9/20/2024       Dear Dr. Dewey:    You previously evaluated and treated patient for osteoporosis and was last seen in June of 2023. Since that time she has sustained a fracture of left distal radius. We recommended to patient to follow-up with you to re-evaluate. Please have your staff reach out to the patient to schedule appointment. She states she is currently only on Ca/Vit D. Thank you for your assistance with the patient.    Sincerely,        Rick Smith MD        CC: No Recipients    Rick Smith MD  9/21/2024  7:12 AM  Sign when Signing Visit  Assessment/Plan:  Patient ID: 79 y.o. female   Surgery: Removal Hardware Left Wrist - Left  Date of Surgery: 8/14/2024    XRs reviewed with the pt today.  The metacarpal has healed nicely.  Described some increase in fracture consolidation of distal radius, fracture lines still visible.   She can continue with activities as tolerated  Discussed we will continue to observe the numbness over dorsal radial sensory nerve distribution and to the thumb.  She was previously treated by [facility] rheumatologist Dr. Dewey for osteoporosis in the past.   A letter was from our office to Dr. Dewey as well to recommend follow-up with them. Additionally, we recommended patient call and schedule follow-up to discuss medical options for osteoporosis. Discussed she may benefit from further medications and treatment.  She is currently on Ca 1200mg daily and Vit D 2000 IU daily    Follow Up:  3 months    To Do Next Visit:  X-rays of the  left  wrist with 20 degree elevated lateral view      CHIEF COMPLAINT:  Chief Complaint   Patient presents with   • Left Wrist - Post-op         SUBJECTIVE:  Patient is a 79 y.o. year old female who presents for follow up after Removal Hardware Left Wrist - Left. Today patient states she  feels like her ROM has improved. She feels like she can transition to a HEP at this point. States she continues to have numbness and tingling along the dorsal radial hand and in the thumb. She does not feel as this has improved.    Pt last seen by Dr. Jules Dewey with [facility] on 6/29/23 for osteoporosis. She had been on Prolia in the past as well as Fosamax, but discontinued these 2/2 side effects (jaw pain). She currently is taking Ca and Vit D (was recommended Ca 1200mg daily and Vit D 2000 IU daily).       PHYSICAL EXAMINATION:  General: Well developed and well nourished, alert and oriented x 3, appears comfortable  Psychiatric: Normal    MUSCULOSKELETAL EXAMINATION:  Incision: Clean, dry, intact  Surgery Site: normal, no evidence of infection , mild swelling to wrist which is improved from prior examination  Active Range of Motion: wrist flexion 40. Wrist extension 55. Pronation 80. Supination 80. No pain with ROM  No tenderness to palpation of distal radius or second metacarpal  Pt able to make a full composite fist. Full digit extension.   Neurovascular status:  Motor intact throughout the hand, but APB 4/5. Pt describes some tingling along the area of the SSBRN. Brisk capillary refill.         Left radial (mm) Left ulnar (mm)   Thumb 6 8   Index 5 5   Long 5 5   Ring 5 5   Small 5 5           STUDIES REVIEWED:  Xrays of the left wrist and hand were reviewed and independently interpreted in PACS by Dr. Smith and demonstrate some progressive healing of the distal radius but fx lines still evident. Pt's index metacarpal fx has healed well      DEXA findings from 10/2/23   Findings:     The lumbar spine was examined at L1, L2 and L3   The bone density of the lumbar spine is 1.153 g/cm2   T-score lumbar spine: 1.2   This is within the normal range   This measurement has increased 4.2% compared to the previous study which is   statistically significant.     Right hip   The bone density of the femoral  neck is 0.781 g/cm2   T-score femoral neck: -0.6   This is within the normal range   This measurement has increased 14.2% compared to the previous study which is   statistically significant.     The bone density of the total hip is 0.738 g/cm2   T-score total hip: -1.7   This is 78% of normal   This measurement has increased 10.5% compared to the previous study which is   statistically significant.     Right forearm   T-score one-third radius: -3.7   This is 68% of normal     PROCEDURES PERFORMED:  Procedures  No Procedures performed today    Scribe Attestation      I,:  Vivian Epstein PA-C am acting as a scribe while in the presence of the attending physician.:       I,:  Rick Smith MD personally performed the services described in this documentation    as scribed in my presence.:

## 2024-09-20 NOTE — PROGRESS NOTES
Assessment/Plan:  Patient ID: 79 y.o. female   Surgery: Removal Hardware Left Wrist - Left  Date of Surgery: 8/14/2024    XRs reviewed with the pt today.  The metacarpal has healed nicely.  Described some increase in fracture consolidation of distal radius, fracture lines still visible.   She can continue with activities as tolerated  Discussed we will continue to observe the numbness over dorsal radial sensory nerve distribution and to the thumb.  Continue topical lidocaine for nerve symptoms. Discussed option of gabapentin/pregabalin and discussed side effect profile. As symptoms are not overly bothersome, will continue to observe for now.  She was previously treated by [facility] rheumatologist Dr. Dewey for osteoporosis in the past.   A letter was from our office to Dr. Dewey as well to recommend follow-up with them. Additionally, we recommended patient call and schedule follow-up to discuss medical options for osteoporosis. Discussed she may benefit from further medications and treatment.  She is currently on Ca 1200mg daily and Vit D 2000 IU daily    Follow Up:  3 months    To Do Next Visit:  X-rays of the  left  wrist with 20 degree elevated lateral view      CHIEF COMPLAINT:  Chief Complaint   Patient presents with    Left Wrist - Post-op         SUBJECTIVE:  Patient is a 79 y.o. year old female who presents for follow up after Removal Hardware Left Wrist - Left. Today patient states she feels like her ROM has improved. She feels like she can transition to a HEP at this point. States she continues to have numbness and tingling along the dorsal radial hand and in the thumb. She does not feel as this has improved. She states she has tried lidocaine to the area which helps.    Pt last seen by Dr. Jules Dewey with [facility] on 6/29/23 for osteoporosis. She had been on Prolia in the past as well as Fosamax, but discontinued these 2/2 side effects (jaw pain). She currently is taking Ca and Vit D (was recommended  Ca 1200mg daily and Vit D 2000 IU daily).       PHYSICAL EXAMINATION:  General: Well developed and well nourished, alert and oriented x 3, appears comfortable  Psychiatric: Normal    MUSCULOSKELETAL EXAMINATION:  Incision: Clean, dry, intact  Surgery Site: normal, no evidence of infection , mild swelling to wrist which is improved from prior examination  Active Range of Motion: wrist flexion 40. Wrist extension 55. Pronation 80. Supination 80. No pain with ROM  No tenderness to palpation of distal radius or second metacarpal  Pt able to make a full composite fist. Full digit extension.   Neurovascular status:  Motor intact throughout the hand, but APB 4/5. Pt describes some tingling along the area of the SSBRN. Brisk capillary refill.         Left radial (mm) Left ulnar (mm)   Thumb 6 8   Index 5 5   Long 5 5   Ring 5 5   Small 5 5           STUDIES REVIEWED:  Xrays of the left wrist and hand were reviewed and independently interpreted in PACS by Dr. Smith and demonstrate some progressive healing of the distal radius but fx lines still evident. Pt's index metacarpal fx has healed well      DEXA findings from 10/2/23   Findings:     The lumbar spine was examined at L1, L2 and L3   The bone density of the lumbar spine is 1.153 g/cm2   T-score lumbar spine: 1.2   This is within the normal range   This measurement has increased 4.2% compared to the previous study which is   statistically significant.     Right hip   The bone density of the femoral neck is 0.781 g/cm2   T-score femoral neck: -0.6   This is within the normal range   This measurement has increased 14.2% compared to the previous study which is   statistically significant.     The bone density of the total hip is 0.738 g/cm2   T-score total hip: -1.7   This is 78% of normal   This measurement has increased 10.5% compared to the previous study which is   statistically significant.     Right forearm   T-score one-third radius: -3.7   This is 68% of normal      PROCEDURES PERFORMED:  Procedures  No Procedures performed today    Scribe Attestation      I,:  Vivian Epstein PA-C am acting as a scribe while in the presence of the attending physician.:       I,:  Rick Smith MD personally performed the services described in this documentation    as scribed in my presence.:

## 2024-10-03 ENCOUNTER — OFFICE VISIT (OUTPATIENT)
Dept: CARDIOLOGY CLINIC | Facility: CLINIC | Age: 79
End: 2024-10-03
Payer: COMMERCIAL

## 2024-10-03 VITALS
OXYGEN SATURATION: 96 % | DIASTOLIC BLOOD PRESSURE: 82 MMHG | SYSTOLIC BLOOD PRESSURE: 134 MMHG | HEIGHT: 66 IN | WEIGHT: 180.6 LBS | BODY MASS INDEX: 29.02 KG/M2 | HEART RATE: 76 BPM

## 2024-10-03 DIAGNOSIS — I48.92 ATRIAL FLUTTER, PAROXYSMAL (HCC): Primary | ICD-10-CM

## 2024-10-03 DIAGNOSIS — E78.2 MIXED HYPERLIPIDEMIA: ICD-10-CM

## 2024-10-03 DIAGNOSIS — I26.99 PULMONARY EMBOLISM, BILATERAL (HCC): ICD-10-CM

## 2024-10-03 PROCEDURE — 99214 OFFICE O/P EST MOD 30 MIN: CPT | Performed by: INTERNAL MEDICINE

## 2024-10-03 PROCEDURE — 93000 ELECTROCARDIOGRAM COMPLETE: CPT | Performed by: INTERNAL MEDICINE

## 2024-10-03 RX ORDER — AMLODIPINE BESYLATE 5 MG/1
5 TABLET ORAL DAILY
COMMUNITY
Start: 2024-08-13

## 2024-10-03 NOTE — PROGRESS NOTES
Cardiology   Stormy Hermosillo 79 y.o. female MRN: [id number]        Impression:  Paroxysmal atrial flutter - likely provoked by pulmonary emboli.  Now in NSR on anticoagulation. Zio monitor demonstrated PAF several months later.   Pulmonary emboli - on anticoagulation.  PVCs - benign  Dyslipidemia - on statin     Recommendations:  Continue current medications.   Follow up in 6 months.         HPI: Stormy Hermosillo is a 79 y.o. year old female with dyslipidemia, PVCs, and recent bilateral PE in setting of R TKA 3/24 c/o episodic atrial flutter while in hospital, who presents for evaluation. At time of PE, patient had echo which demonstrated normal LV function, mildly dilated RV with mildly reduced RV systolic function, mod TR with mod PHTN. Currently asymptomatic. No chest pain, shortness of breath, or palpitations. Echo  - EF normal with normal RV and no sig valve abnormalities.  Zio monitor demonstrated PAF. No chest pain, shortness of breath, or palpitations.          Review of Systems   Constitutional: Negative.    HENT: Negative.     Eyes: Negative.    Respiratory:  Negative for chest tightness and shortness of breath.    Cardiovascular:  Negative for chest pain, palpitations and leg swelling.   Gastrointestinal: Negative.    Endocrine: Negative.    Genitourinary: Negative.    Musculoskeletal: Negative.    Skin: Negative.    Allergic/Immunologic: Negative.    Neurological: Negative.    Hematological: Negative.    Psychiatric/Behavioral: Negative.     All other systems reviewed and are negative.        Past Medical History:   Diagnosis Date    Atrial flutter (HCC)     Deep vein thrombosis (HCC)     Hearing aid worn     Pulmonary embolism (HCC)      Past Surgical History:   Procedure Laterality Date    BACK SURGERY      BREAST SURGERY       SECTION      x3    FOOT SURGERY Bilateral     HIP SURGERY Left     JOINT REPLACEMENT      KNEE SURGERY Bilateral     ME NDSC WRST SURG W/RLS TRANSVRS CARPL LIGM  Left 06/26/2024    Procedure: RELEASE CARPAL TUNNEL ENDOSCOPIC;  Surgeon: Rick Smith MD;  Location: [facility];  Service: Orthopedics    RI OPTX DSTL RADL I-ARTIC FX/EPIPHYSL SEP 3 FRAG Left 06/26/2024    Procedure: OPEN REDUCTION W/ INTERNAL FIXATION (ORIF) RADIUS  (WRIST);  Surgeon: Rick Smith MD;  Location: [facility];  Service: Orthopedics    RI REMOVAL IMPLANT DEEP Left 8/14/2024    Procedure: REMOVAL HARDWARE LEFT WRIST;  Surgeon: Rick Smith MD;  Location: [facility];  Service: Orthopedics     Social History     Substance and Sexual Activity   Alcohol Use Yes    Comment: rarely     Social History     Substance and Sexual Activity   Drug Use No     Social History     Tobacco Use   Smoking Status Never   Smokeless Tobacco Never     Family History   Problem Relation Age of Onset    Heart disease Mother     Cancer Mother     Varicose Veins Mother     Heart disease Father     Heart disease Sister     Cancer Sister     Cancer Brother     Varicose Veins Maternal Grandmother        Allergies:  Allergies   Allergen Reactions    Tramadol GI Intolerance       Medications:     Current Outpatient Medications:     acetaminophen (TYLENOL) 500 mg tablet, Take 500 mg by mouth every 6 (six) hours, Disp: , Rfl:     amLODIPine (NORVASC) 5 mg tablet, Take 5 mg by mouth daily, Disp: , Rfl:     apixaban (Eliquis) 2.5 mg, Take 1 tablet (2.5 mg total) by mouth 2 (two) times a day, Disp: 180 tablet, Rfl: 0    aspirin 81 MG tablet, Take 81 mg by mouth 2 (two) times a day, Disp: , Rfl:     atorvastatin (LIPITOR) 10 mg tablet, Take 10 mg by mouth daily, Disp: , Rfl: 0    BIOTIN PO, Take by mouth, Disp: , Rfl:     Calcium Carbonate-Vitamin D (CALCIUM 600+D PO), Take by mouth, Disp: , Rfl:     guaiFENesin (MUCINEX) 600 mg 12 hr tablet, Take 600 mg by mouth, Disp: , Rfl:     metoprolol succinate (TOPROL-XL) 25 mg 24 hr tablet, Take 1 tablet (25 mg total) by mouth daily, Disp: 30 tablet, Rfl: 0     mometasone (NASONEX) 50 mcg/act nasal spray, 2 sprays daily, Disp: , Rfl:     Multiple Vitamins-Minerals (MULTIVITAMIN ADULT PO), Take 1 tablet by mouth, Disp: , Rfl:     omeprazole (PriLOSEC) 20 mg delayed release capsule, Take 20 mg by mouth daily, Disp: , Rfl:     oxyCODONE (ROXICODONE) 5 immediate release tablet, Take 1 tablet (5 mg total) by mouth every 6 (six) hours as needed for moderate pain or severe pain for up to 8 doses Cont of tx Max Daily Amount: 20 mg (Patient not taking: Reported on 8/23/2024), Disp: 8 tablet, Rfl: 0    Current Facility-Administered Medications:     bupivacaine (MARCAINE) 0.25 % injection 0.5 mL, 0.5 mL, Injection, , Bharath Uribe, DPM, 0.5 mL at 12/14/22 1559  bupivacaine, 0.5 mL,         Wt Readings from Last 3 Encounters:   10/03/24 81.9 kg (180 lb 9.6 oz)   09/20/24 79.8 kg (176 lb)   08/23/24 79.8 kg (176 lb)     Temp Readings from Last 3 Encounters:   08/14/24 (!) 96.8 °F (36 °C)   07/17/24 98.9 °F (37.2 °C) (Tympanic)   06/26/24 (!) 97.3 °F (36.3 °C) (Temporal)     BP Readings from Last 3 Encounters:   10/03/24 134/82   08/14/24 130/71   07/17/24 116/84     Pulse Readings from Last 3 Encounters:   10/03/24 76   08/14/24 70   07/17/24 72         Physical Exam  HENT:      Head: Atraumatic.   Cardiovascular:      Rate and Rhythm: Normal rate and regular rhythm.      Heart sounds: Normal heart sounds.   Pulmonary:      Effort: Pulmonary effort is normal.      Breath sounds: Normal breath sounds.   Abdominal:      General: Abdomen is flat.   Musculoskeletal:         General: Normal range of motion.      Cervical back: Normal range of motion.   Skin:     General: Skin is warm.   Neurological:      General: No focal deficit present.      Mental Status: She is alert and oriented to person, place, and time.           Laboratory Studies:  CMP:  Lab Results   Component Value Date    K 4.4 06/19/2024     06/19/2024    CO2 29 06/19/2024    BUN 20 06/19/2024    CREATININE 0.81  "06/19/2024    AST 17 06/19/2024    ALT 15 06/19/2024    EGFR 69 06/19/2024       Lipid Profile:   No results found for: \"CHOL\"  Lab Results   Component Value Date    HDL 64 01/02/2024     Lab Results   Component Value Date    LDLCALC 66 01/02/2024     Lab Results   Component Value Date    TRIG 90 01/02/2024       Cardiac testing:   EKG reviewed personally: NSR 76 NIDIA [id number]            "

## 2024-10-11 ENCOUNTER — TRANSCRIBE ORDERS (OUTPATIENT)
Dept: LAB | Facility: CLINIC | Age: 79
End: 2024-10-11

## 2024-10-11 ENCOUNTER — APPOINTMENT (OUTPATIENT)
Dept: LAB | Facility: CLINIC | Age: 79
End: 2024-10-11
Payer: COMMERCIAL

## 2024-10-11 DIAGNOSIS — D75.839 THROMBOCYTHEMIA: ICD-10-CM

## 2024-10-11 DIAGNOSIS — E78.2 MIXED HYPERLIPIDEMIA: Primary | ICD-10-CM

## 2024-10-11 DIAGNOSIS — E78.2 MIXED HYPERLIPIDEMIA: ICD-10-CM

## 2024-10-11 DIAGNOSIS — R73.01 IMPAIRED FASTING GLUCOSE: ICD-10-CM

## 2024-10-11 LAB
ALBUMIN SERPL BCG-MCNC: 4 G/DL (ref 3.5–5)
ALP SERPL-CCNC: 90 U/L (ref 34–104)
ALT SERPL W P-5'-P-CCNC: 18 U/L (ref 7–52)
ANION GAP SERPL CALCULATED.3IONS-SCNC: 9 MMOL/L (ref 4–13)
AST SERPL W P-5'-P-CCNC: 17 U/L (ref 13–39)
BASOPHILS # BLD AUTO: 0.07 THOUSANDS/ΜL (ref 0–0.1)
BASOPHILS NFR BLD AUTO: 1 % (ref 0–1)
BILIRUB SERPL-MCNC: 0.48 MG/DL (ref 0.2–1)
BUN SERPL-MCNC: 14 MG/DL (ref 5–25)
CALCIUM SERPL-MCNC: 9 MG/DL (ref 8.4–10.2)
CHLORIDE SERPL-SCNC: 105 MMOL/L (ref 96–108)
CHOLEST SERPL-MCNC: 165 MG/DL
CO2 SERPL-SCNC: 28 MMOL/L (ref 21–32)
CREAT SERPL-MCNC: 0.75 MG/DL (ref 0.6–1.3)
EOSINOPHIL # BLD AUTO: 0.3 THOUSAND/ΜL (ref 0–0.61)
EOSINOPHIL NFR BLD AUTO: 4 % (ref 0–6)
ERYTHROCYTE [DISTWIDTH] IN BLOOD BY AUTOMATED COUNT: 13.5 % (ref 11.6–15.1)
EST. AVERAGE GLUCOSE BLD GHB EST-MCNC: 117 MG/DL
GFR SERPL CREATININE-BSD FRML MDRD: 75 ML/MIN/1.73SQ M
GLUCOSE P FAST SERPL-MCNC: 85 MG/DL (ref 65–99)
HBA1C MFR BLD: 5.7 %
HCT VFR BLD AUTO: 48.1 % (ref 34.8–46.1)
HDLC SERPL-MCNC: 58 MG/DL
HGB BLD-MCNC: 14.8 G/DL (ref 11.5–15.4)
IMM GRANULOCYTES # BLD AUTO: 0.01 THOUSAND/UL (ref 0–0.2)
IMM GRANULOCYTES NFR BLD AUTO: 0 % (ref 0–2)
LDLC SERPL CALC-MCNC: 88 MG/DL (ref 0–100)
LYMPHOCYTES # BLD AUTO: 3.09 THOUSANDS/ΜL (ref 0.6–4.47)
LYMPHOCYTES NFR BLD AUTO: 44 % (ref 14–44)
MCH RBC QN AUTO: 28.8 PG (ref 26.8–34.3)
MCHC RBC AUTO-ENTMCNC: 30.8 G/DL (ref 31.4–37.4)
MCV RBC AUTO: 94 FL (ref 82–98)
MONOCYTES # BLD AUTO: 0.81 THOUSAND/ΜL (ref 0.17–1.22)
MONOCYTES NFR BLD AUTO: 11 % (ref 4–12)
NEUTROPHILS # BLD AUTO: 2.81 THOUSANDS/ΜL (ref 1.85–7.62)
NEUTS SEG NFR BLD AUTO: 40 % (ref 43–75)
NONHDLC SERPL-MCNC: 107 MG/DL
NRBC BLD AUTO-RTO: 0 /100 WBCS
PLATELET # BLD AUTO: 359 THOUSANDS/UL (ref 149–390)
PMV BLD AUTO: 11.2 FL (ref 8.9–12.7)
POTASSIUM SERPL-SCNC: 4.1 MMOL/L (ref 3.5–5.3)
PROT SERPL-MCNC: 6.5 G/DL (ref 6.4–8.4)
RBC # BLD AUTO: 5.14 MILLION/UL (ref 3.81–5.12)
SODIUM SERPL-SCNC: 142 MMOL/L (ref 135–147)
TRIGL SERPL-MCNC: 94 MG/DL
WBC # BLD AUTO: 7.09 THOUSAND/UL (ref 4.31–10.16)

## 2024-10-11 PROCEDURE — 36415 COLL VENOUS BLD VENIPUNCTURE: CPT

## 2024-10-11 PROCEDURE — 80061 LIPID PANEL: CPT

## 2024-10-11 PROCEDURE — 80053 COMPREHEN METABOLIC PANEL: CPT

## 2024-10-11 PROCEDURE — 85025 COMPLETE CBC W/AUTO DIFF WBC: CPT

## 2024-10-11 PROCEDURE — 83036 HEMOGLOBIN GLYCOSYLATED A1C: CPT

## 2024-11-18 ENCOUNTER — HOSPITAL ENCOUNTER (OUTPATIENT)
Dept: RADIOLOGY | Facility: HOSPITAL | Age: 79
Discharge: HOME | End: 2024-11-18
Attending: STUDENT IN AN ORGANIZED HEALTH CARE EDUCATION/TRAINING PROGRAM
Payer: COMMERCIAL

## 2024-11-18 ENCOUNTER — OFFICE VISIT (OUTPATIENT)
Dept: OBGYN CLINIC | Facility: CLINIC | Age: 79
End: 2024-11-18
Payer: COMMERCIAL

## 2024-11-18 DIAGNOSIS — S52.502A CLOSED FRACTURE OF DISTAL END OF LEFT RADIUS, UNSPECIFIED FRACTURE MORPHOLOGY, INITIAL ENCOUNTER: ICD-10-CM

## 2024-11-18 DIAGNOSIS — S52.572G OTHER CLOSED INTRA-ARTICULAR FRACTURE OF DISTAL END OF LEFT RADIUS WITH DELAYED HEALING, SUBSEQUENT ENCOUNTER: Primary | ICD-10-CM

## 2024-11-18 PROBLEM — S52.502K: Status: ACTIVE | Noted: 2024-11-18

## 2024-11-18 PROBLEM — S52.602K: Status: ACTIVE | Noted: 2024-11-18

## 2024-11-18 PROCEDURE — 73130 X-RAY EXAM OF HAND: CPT

## 2024-11-18 PROCEDURE — 73110 X-RAY EXAM OF WRIST: CPT

## 2024-11-18 PROCEDURE — 99214 OFFICE O/P EST MOD 30 MIN: CPT | Performed by: STUDENT IN AN ORGANIZED HEALTH CARE EDUCATION/TRAINING PROGRAM

## 2024-11-18 NOTE — PROGRESS NOTES
"ORTHOPAEDIC HAND, WRIST, AND ELBOW OFFICE  VISIT      ASSESSMENT/PLAN:      Diagnoses and all orders for this visit:    Other closed intra-articular fracture of distal end of left radius with delayed healing, subsequent encounter  -     XR wrist 3+ vw left; Future  -     XR hand 3+ vw left; Future  -     Osteogenesic Stimulator          79 y.o. female with left distal radius fracture s/p spanning plate on 6/26/2024 and s/p SHANELL L wrist 8/14/24.  XRs reviewed with the pt today.  Since the pt has continued evidence of fx line as is > 3 months out from her most recent surgery this meets criteria for nonunion based on medicare guidelines. Given such we suggest use of bone stim at this time. We will continue to watch fracture for continued healing.  Order has been placed and pt should expect a call from the rep regarding this soon.  Recommend continued follow up with Dr. Dewey (rheumatologist) for osteoporosis management. Plan to start forteo in March. Currently on calcium and vitamin D supplementation as well.  Treatment options and expected outcomes were discussed.  The patient verbalized understanding of exam findings and treatment plan.   The patient was given the opportunity to ask questions.  Questions were answered to the patient's satisfaction.      Follow Up:  3 months       To Do Next Visit:  X-rays of the  left  wrist with 20 degree elevated lateral view        Rick Smith MD  Attending, Orthopaedic Surgery  Hand, Wrist, and Elbow Surgery  [facility] Orthopaedic [facility]    ______________________________________________________________________________________________    CHIEF COMPLAINT:  Chief Complaint   Patient presents with    Left Wrist - Post-op       SUBJECTIVE:  Patient is a 79 y.o. RHD female who presents today for follow up s/p SHANELL L wrist 8/14/24.  Pt states it is \"coming along\". Describes continued n/t along the dorsal thumb, although significantly improving. She has discontinued therapy and " "is doing home exercises on her note. She did follow up with her rheumatologist Dr. Dewey last month for her osteoporosis as was recommended. She states she is currently on Ca/Vit D, but will not be starting her osteoporosis medication (forteo) until 2025 as it is quite costly if she starts it before then. After March, the cost should be minimal.    Per Dr. Dewey's note:  \"Patient with prior DXA from 10/2023 showing t-score osteoporosis right forearm, hip -1.7. She has prior history of wrist fragility fracture. Started on prolia in 3/2022. Patient did develop significant bone aches and jaw pain after Prolia injection and is therefore not interested in restarting. Previously prescribed fosamax however never took. Recommended calcium 1200mg daily and vit d 2000iu daily. Recommended weight bearing exercises. With multiple fractures discussed anabolic therapy - no hx of CAD or CVA. Will prior auth evenity. Provided printed information. Repeat labs prior to next appointment in 3 months. \"       I have personally reviewed all the relevant PMH, PSH, SH, FH, Medications and allergies      PAST MEDICAL HISTORY:  Past Medical History:   Diagnosis Date    Atrial flutter (HCC)     Deep vein thrombosis (HCC)     Hearing aid worn     Pulmonary embolism (HCC)        PAST SURGICAL HISTORY:  Past Surgical History:   Procedure Laterality Date    BACK SURGERY      BREAST SURGERY       SECTION      x3    FOOT SURGERY Bilateral     HIP SURGERY Left     JOINT REPLACEMENT      KNEE SURGERY Bilateral     AZ NDSC WRST SURG W/RLS TRANSVRS CARPL LIGM Left 2024    Procedure: RELEASE CARPAL TUNNEL ENDOSCOPIC;  Surgeon: Rick Smith MD;  Location: [facility] MAIN OR;  Service: Orthopedics    [place] OPTX DSTL RADL I-ARTIC FX/EPIPHYSL SEP 3 FRAG Left 2024    Procedure: OPEN REDUCTION W/ INTERNAL FIXATION (ORIF) RADIUS  (WRIST);  Surgeon: Rick Smith MD;  Location: [facility] MAIN OR;  Service: Orthopedics    AZ REMOVAL " IMPLANT DEEP Left 8/14/2024    Procedure: REMOVAL HARDWARE LEFT WRIST;  Surgeon: Rick Smith MD;  [facility];  Service: Orthopedics       FAMILY HISTORY:  Family History   Problem Relation Age of Onset    Heart disease Mother     Cancer Mother     Varicose Veins Mother     Heart disease Father     Heart disease Sister     Cancer Sister     Cancer Brother     Varicose Veins Maternal Grandmother        SOCIAL HISTORY:  Social History     Tobacco Use    Smoking status: Never    Smokeless tobacco: Never   Vaping Use    Vaping status: Never Used   Substance Use Topics    Alcohol use: Yes     Comment: rarely    Drug use: No       MEDICATIONS:    Current Outpatient Medications:     acetaminophen (TYLENOL) 500 mg tablet, Take 500 mg by mouth every 6 (six) hours, Disp: , Rfl:     amLODIPine (NORVASC) 5 mg tablet, Take 5 mg by mouth daily, Disp: , Rfl:     aspirin 81 MG tablet, Take 81 mg by mouth 2 (two) times a day, Disp: , Rfl:     atorvastatin (LIPITOR) 10 mg tablet, Take 10 mg by mouth daily, Disp: , Rfl: 0    BIOTIN PO, Take by mouth, Disp: , Rfl:     Calcium Carbonate-Vitamin D (CALCIUM 600+D PO), Take by mouth, Disp: , Rfl:     guaiFENesin (MUCINEX) 600 mg 12 hr tablet, Take 600 mg by mouth, Disp: , Rfl:     metoprolol succinate (TOPROL-XL) 25 mg 24 hr tablet, Take 1 tablet (25 mg total) by mouth daily, Disp: 30 tablet, Rfl: 0    mometasone (NASONEX) 50 mcg/act nasal spray, 2 sprays daily, Disp: , Rfl:     Multiple Vitamins-Minerals (MULTIVITAMIN ADULT PO), Take 1 tablet by mouth, Disp: , Rfl:     omeprazole (PriLOSEC) 20 mg delayed release capsule, Take 20 mg by mouth daily, Disp: , Rfl:     apixaban (Eliquis) 2.5 mg, Take 1 tablet (2.5 mg total) by mouth 2 (two) times a day, Disp: 180 tablet, Rfl: 0    oxyCODONE (ROXICODONE) 5 immediate release tablet, Take 1 tablet (5 mg total) by mouth every 6 (six) hours as needed for moderate pain or severe pain for up to 8 doses Cont of tx Max Daily Amount:  20 mg (Patient not taking: Reported on 8/23/2024), Disp: 8 tablet, Rfl: 0    Current Facility-Administered Medications:     bupivacaine (MARCAINE) 0.25 % injection 0.5 mL, 0.5 mL, Injection, , Bharath Mckeon Uribe, DPM, 0.5 mL at 12/14/22 7979    ALLERGIES:  Allergies   Allergen Reactions    Tramadol GI Intolerance           REVIEW OF SYSTEMS:  Musculoskeletal:        As noted in HPI.   All other systems reviewed and are negative.    VITALS:  There were no vitals filed for this visit.    LABS:  HgA1c:   Lab Results   Component Value Date    HGBA1C 5.7 (H) 10/11/2024     BMP:   Lab Results   Component Value Date    CALCIUM 9.0 10/11/2024    K 4.1 10/11/2024    CO2 28 10/11/2024     10/11/2024    BUN 14 10/11/2024    CREATININE 0.75 10/11/2024       _____________________________________________________  PHYSICAL EXAMINATION:  General: Well developed and well nourished, alert & oriented x 3, appears comfortable  Psychiatric: Normal  HEENT: Normocephalic, Atraumatic Trachea Midline, No torticollis  Pulmonary: No audible wheezing or respiratory distress   Abdomen/GI: Non tender, non distended   Cardiovascular: No pitting edema, 2+ radial pulse   Skin: No masses, erythema, lacerations, fluctation, ulcerations  Neurovascular: Sensation Intact to the Median, Ulnar, Radial Nerve, Motor Intact to the Median, Ulnar, Radial Nerve, and Pulses Intact  Musculoskeletal: Normal, except as noted in detailed exam and in HPI.      MUSCULOSKELETAL EXAMINATION:  Left Wrist:  Well-healed incisions.  Minimal swelling  Wrist flexion 55.  Wrist extension 50.   Pronation 75, supination 75  Composite fist.   Nontender to palpation of index metacarpal or distal radius    ___________________________________________________  STUDIES REVIEWED:  Xrays of the left wrist were reviewed and independently interpreted in PACS by Dr. Smith and demonstrate persistent evidence of fx line seen in the distal radius s/p SHANELL > 3 months ago with < 1cm gap.  Pt's metacarpal fx has healed without any concerns.         DXA 9/2021  CENTRAL DXA SCAN     CLINICAL HISTORY: 76-year-old postmenopausal  female with no significant past medical history. Osteoporosis screening.   OTHER RISK FACTORS: Gastroesophageal reflux with Prilosec use.     PHARMACOLOGIC THERAPY FOR OSTEOPOROSIS: None.     TECHNIQUE: Bone densitometry was performed using a Hologic Horizon A bone densitometer. Regions of interest appear properly placed.     COMPARISON: There are no prior DXA studies performed on this unit for comparison.     RESULTS:   LUMBAR SPINE L1-L3 (L4 vertebra excluded from analysis due to local structural abnormalities or artifact):   BMD 1.106 gm/cm2   T-score 0.8   These values are artifactually elevated due to the presence of scoliosis with spondylosis.     RIGHT TOTAL HIP:   BMD: 0.668 gm/cm2   T-score: -2.2     RIGHT FEMORAL NECK:   BMD: 0.684 gm/cm2   T score: -1.5     RIGHT FOREARM :   BMD 0.496 gm/sq-cm,   T-score is -3.2     IMPRESSION:   1. OSTEOPOROSIS. [Based on the right radius]     2. The 10 year risk of hip fracture is 3 with the 10 year risk of major osteoporotic fracture being 12 as calculated by the [employer]/WHO fracture risk assessment tool (FRAX).     3. The current NOF guidelines recommend treating patients with a T-score of -2.5 or less in the lumbar spine or hips, or in post-menopausal women and men over the age of 50 with low bone mass (osteopenia) and a FRAX 10 year risk score of >3% for hip fracture and/or >20% for major osteoporotic fracture.     4. The NOF recommends follow-up DXA in 1-2 years after initiating therapy for osteoporosis and every 2 years thereafter. More frequent evaluation is appropriate for patients with conditions associated with rapid bone loss, such as glucocorticoid   therapy. The interval between DXA screenings may be longer for individuals without major risk factors and initial T-score in the normal or upper low  bone mass range.     PROCEDURES PERFORMED:  Procedures  No Procedures performed today    _____________________________________________________      Scribe Attestation      I,:  Vivian Epstein PA-C am acting as a scribe while in the presence of the attending physician.:       I,:  Rick Smith MD personally performed the services described in this documentation    as scribed in my presence.:

## 2024-12-13 ENCOUNTER — HOSPITAL ENCOUNTER (OUTPATIENT)
Dept: RADIOLOGY | Facility: HOSPITAL | Age: 79
End: 2024-12-13
Attending: STUDENT IN AN ORGANIZED HEALTH CARE EDUCATION/TRAINING PROGRAM
Payer: COMMERCIAL

## 2024-12-13 ENCOUNTER — OFFICE VISIT (OUTPATIENT)
Dept: OBGYN CLINIC | Facility: CLINIC | Age: 79
End: 2024-12-13
Payer: COMMERCIAL

## 2024-12-13 VITALS — BODY MASS INDEX: 30.73 KG/M2 | HEIGHT: 64 IN | WEIGHT: 180 LBS

## 2024-12-13 DIAGNOSIS — M25.532 PAIN IN LEFT WRIST: ICD-10-CM

## 2024-12-13 DIAGNOSIS — S52.572G OTHER CLOSED INTRA-ARTICULAR FRACTURE OF DISTAL END OF LEFT RADIUS WITH DELAYED HEALING, SUBSEQUENT ENCOUNTER: Primary | ICD-10-CM

## 2024-12-13 PROCEDURE — 99214 OFFICE O/P EST MOD 30 MIN: CPT | Performed by: STUDENT IN AN ORGANIZED HEALTH CARE EDUCATION/TRAINING PROGRAM

## 2024-12-13 PROCEDURE — 73110 X-RAY EXAM OF WRIST: CPT

## 2024-12-13 NOTE — PROGRESS NOTES
ORTHOPAEDIC HAND, WRIST, AND ELBOW OFFICE  VISIT      ASSESSMENT/PLAN:      Diagnoses and all orders for this visit:    Other closed intra-articular fracture of distal end of left radius with delayed healing, subsequent encounter  -     XR wrist 3+ vw left; Future          79 y.o. female with  left distal radius fracture s/p spanning plate on 6/26/2024 and s/p SHANELL L wrist 8/14/24.     X-rays were reviewed in the office today which demonstrate further fracture consolidation. The patient may hold off on the bone stim secondary to out of pocket cost. Recommend continued follow up with Dr. Dewey (rheumatologist) for osteoporosis management. Plan to start forteo in March. Currently on calcium and vitamin D supplementation as well.    Follow Up:  3 months       To Do Next Visit:  20 degree lateral with good DRUJ PA view (Sarah to review x-rays prior to returning from x-ray)        Rick Smith MD  Attending, Orthopaedic Surgery  Hand, Wrist, and Elbow Surgery  [facility] Orthopaedic [facility]    ______________________________________________________________________________________________    CHIEF COMPLAINT:  Chief Complaint   Patient presents with   • Left Wrist - Follow-up       SUBJECTIVE:  Patient is a 79 y.o. RHD female who presents today for follow up of  left distal radius fracture s/p spanning plate on 6/26/2024 and s/p SHANELL L wrist 8/14/24.  She notes some numbness to the dorsal ulnar aspect of the thumb and radial shaft of index metacarpal, which has improved slightly from last examination. The patient did not get the bone stim due to cost (about $600 out of pocket). Patient followed up today to VGTel bone stimulator.          I have personally reviewed all the relevant PMH, PSH, SH, FH, Medications and allergies      PAST MEDICAL HISTORY:  Past Medical History:   Diagnosis Date   • Atrial flutter (HCC)    • Deep vein thrombosis (HCC)    • Hearing aid worn    • Pulmonary embolism (HCC)        PAST  SURGICAL HISTORY:  Past Surgical History:   Procedure Laterality Date   • BACK SURGERY     • BREAST SURGERY     •  SECTION      x3   • FOOT SURGERY Bilateral    • HIP SURGERY Left    • JOINT REPLACEMENT     • KNEE SURGERY Bilateral    • [place] NDSC WRST SURG W/RLS TRANSVRS CARPL LIGM Left 2024    Procedure: RELEASE CARPAL TUNNEL ENDOSCOPIC;  Surgeon: Rick Smith MD;  [facility];  Service: Orthopedics   • [place] OPTX DSTL RADL I-ARTIC FX/EPIPHYSL SEP 3 FRAG Left 2024    Procedure: OPEN REDUCTION W/ INTERNAL FIXATION (ORIF) RADIUS  (WRIST);  Surgeon: Rick Smith MD;  [facility];  Service: Orthopedics   • OK REMOVAL IMPLANT DEEP Left 2024    Procedure: REMOVAL HARDWARE LEFT WRIST;  Surgeon: Rick Smith MD;  [facility];  Service: Orthopedics       FAMILY HISTORY:  Family History   Problem Relation Age of Onset   • Heart disease Mother    • Cancer Mother    • Varicose Veins Mother    • Heart disease Father    • Heart disease Sister    • Cancer Sister    • Cancer Brother    • Varicose Veins Maternal Grandmother        SOCIAL HISTORY:  Social History     Tobacco Use   • Smoking status: Never   • Smokeless tobacco: Never   Vaping Use   • Vaping status: Never Used   Substance Use Topics   • Alcohol use: Yes     Comment: rarely   • Drug use: No       MEDICATIONS:    Current Outpatient Medications:   •  amLODIPine (NORVASC) 5 mg tablet, Take 5 mg by mouth daily, Disp: , Rfl:   •  aspirin 81 MG tablet, Take 81 mg by mouth 2 (two) times a day, Disp: , Rfl:   •  atorvastatin (LIPITOR) 10 mg tablet, Take 10 mg by mouth daily, Disp: , Rfl: 0  •  BIOTIN PO, Take by mouth, Disp: , Rfl:   •  Calcium Carbonate-Vitamin D (CALCIUM 600+D PO), Take by mouth, Disp: , Rfl:   •  guaiFENesin (MUCINEX) 600 mg 12 hr tablet, Take 600 mg by mouth, Disp: , Rfl:   •  metoprolol succinate (TOPROL-XL) 25 mg 24 hr tablet, Take 1 tablet (25 mg total) by mouth daily, Disp: 30  tablet, Rfl: 0  •  mometasone (NASONEX) 50 mcg/act nasal spray, 2 sprays daily, Disp: , Rfl:   •  Multiple Vitamins-Minerals (MULTIVITAMIN ADULT PO), Take 1 tablet by mouth, Disp: , Rfl:   •  omeprazole (PriLOSEC) 20 mg delayed release capsule, Take 20 mg by mouth daily, Disp: , Rfl:   •  acetaminophen (TYLENOL) 500 mg tablet, Take 500 mg by mouth every 6 (six) hours, Disp: , Rfl:   •  apixaban (Eliquis) 2.5 mg, Take 1 tablet (2.5 mg total) by mouth 2 (two) times a day, Disp: 180 tablet, Rfl: 0  •  oxyCODONE (ROXICODONE) 5 immediate release tablet, Take 1 tablet (5 mg total) by mouth every 6 (six) hours as needed for moderate pain or severe pain for up to 8 doses Cont of tx Max Daily Amount: 20 mg (Patient not taking: Reported on 8/23/2024), Disp: 8 tablet, Rfl: 0    Current Facility-Administered Medications:   •  bupivacaine (MARCAINE) 0.25 % injection 0.5 mL, 0.5 mL, Injection, , Bharath Mckeon Uribe, DPM, 0.5 mL at 12/14/22 1330    ALLERGIES:  Allergies   Allergen Reactions   • Tramadol GI Intolerance           REVIEW OF SYSTEMS:  Musculoskeletal:        As noted in HPI.   All other systems reviewed and are negative.    VITALS:  There were no vitals filed for this visit.    LABS:  HgA1c:   Lab Results   Component Value Date    HGBA1C 5.7 (H) 10/11/2024     BMP:   Lab Results   Component Value Date    CALCIUM 9.0 10/11/2024    K 4.1 10/11/2024    CO2 28 10/11/2024     10/11/2024    BUN 14 10/11/2024    CREATININE 0.75 10/11/2024       _____________________________________________________  PHYSICAL EXAMINATION:  General: Well developed and well nourished, alert & oriented x 3, appears comfortable  Psychiatric: Normal  HEENT: Normocephalic, Atraumatic Trachea Midline, No torticollis  Pulmonary: No audible wheezing or respiratory distress   Abdomen/GI: Non tender, non distended   Cardiovascular: No pitting edema, 2+ radial pulse   Skin: No masses, erythema, lacerations, fluctation, ulcerations  Neurovascular:  Sensation Intact to the Median, Ulnar, Radial Nerve, Motor Intact to the Median, Ulnar, Radial Nerve, and Pulses Intact  Musculoskeletal: Normal, except as noted in detailed exam and in HPI.      MUSCULOSKELETAL EXAMINATION:  Left wrist   Well healed surgical incisions  Minimal swelling to extremity  Flexion 50  Extension 60  Painless wrist ROM  Full composite fist  Full digit extension  Sensation intact to light touch to volar fingertips  Decreased subjective sensation to dorsoulnar aspect of thumb and dorsoradial aspect of index metacarpal.    ___________________________________________________  STUDIES REVIEWED:  Xrays of the left wrist were reviewed and independently interpreted in PACS by Dr. Smith and demonstrate healing left distal radius fracture with increased fracture consolidation compared to previous films.          PROCEDURES PERFORMED:  Procedures  No Procedures performed today    _____________________________________________________      Scribe Attestation    I,:  Marcy Amaya MA am acting as a scribe while in the presence of the attending physician.:       I,:  Rick Smith MD personally performed the services described in this documentation    as scribed in my presence.:

## 2025-01-09 ENCOUNTER — APPOINTMENT (OUTPATIENT)
Dept: LAB | Facility: CLINIC | Age: 80
End: 2025-01-09
Payer: COMMERCIAL

## 2025-01-09 ENCOUNTER — TRANSCRIBE ORDERS (OUTPATIENT)
Dept: LAB | Facility: CLINIC | Age: 80
End: 2025-01-09

## 2025-01-09 DIAGNOSIS — M80.00XD OSTEOPOROSIS WITH PATHOLOGICAL FRACTURE WITH ROUTINE HEALING, SUBSEQUENT ENCOUNTER: ICD-10-CM

## 2025-01-09 DIAGNOSIS — M80.00XD OSTEOPOROSIS WITH PATHOLOGICAL FRACTURE WITH ROUTINE HEALING, SUBSEQUENT ENCOUNTER: Primary | ICD-10-CM

## 2025-01-09 LAB
25(OH)D3 SERPL-MCNC: 35.9 NG/ML (ref 30–100)
ANION GAP SERPL CALCULATED.3IONS-SCNC: 8 MMOL/L (ref 4–13)
BUN SERPL-MCNC: 17 MG/DL (ref 5–25)
CALCIUM SERPL-MCNC: 9.3 MG/DL (ref 8.4–10.2)
CHLORIDE SERPL-SCNC: 103 MMOL/L (ref 96–108)
CO2 SERPL-SCNC: 30 MMOL/L (ref 21–32)
CREAT SERPL-MCNC: 0.88 MG/DL (ref 0.6–1.3)
GFR SERPL CREATININE-BSD FRML MDRD: 62 ML/MIN/1.73SQ M
GLUCOSE P FAST SERPL-MCNC: 87 MG/DL (ref 65–99)
MAGNESIUM SERPL-MCNC: 2.2 MG/DL (ref 1.9–2.7)
PHOSPHATE SERPL-MCNC: 4 MG/DL (ref 2.3–4.1)
POTASSIUM SERPL-SCNC: 4.5 MMOL/L (ref 3.5–5.3)
PTH-INTACT SERPL-MCNC: 57.8 PG/ML (ref 12–88)
SODIUM SERPL-SCNC: 141 MMOL/L (ref 135–147)

## 2025-01-09 PROCEDURE — 83735 ASSAY OF MAGNESIUM: CPT

## 2025-01-09 PROCEDURE — 84100 ASSAY OF PHOSPHORUS: CPT

## 2025-01-09 PROCEDURE — 83970 ASSAY OF PARATHORMONE: CPT

## 2025-01-09 PROCEDURE — 80048 BASIC METABOLIC PNL TOTAL CA: CPT

## 2025-01-09 PROCEDURE — 82306 VITAMIN D 25 HYDROXY: CPT

## 2025-01-09 PROCEDURE — 36415 COLL VENOUS BLD VENIPUNCTURE: CPT

## 2025-01-23 ENCOUNTER — OFFICE VISIT (OUTPATIENT)
Dept: PODIATRY | Facility: CLINIC | Age: 80
End: 2025-01-23
Payer: COMMERCIAL

## 2025-01-23 VITALS — WEIGHT: 175 LBS | BODY MASS INDEX: 29.88 KG/M2 | RESPIRATION RATE: 18 BRPM | HEIGHT: 64 IN

## 2025-01-23 DIAGNOSIS — M20.61 ACQUIRED DEFORMITY OF RIGHT TOE: Primary | ICD-10-CM

## 2025-01-23 DIAGNOSIS — L84 CORNS: ICD-10-CM

## 2025-01-23 PROCEDURE — 99202 OFFICE O/P NEW SF 15 MIN: CPT | Performed by: PODIATRIST

## 2025-01-23 NOTE — PROGRESS NOTES
"Name: Stormy Hermosillo      : 1945      MRN: [id number]  Encounter Provider: Oleg Diaz DPM  Encounter Date: 2025   [facility]    Explained the patient that she is dealing with soft corns on the right great toe and second toe due to pressure from the medial deviated second toe.  Treatment consisted of lesion trimming.  Dispensed large and medium Silipos pads.  Explained that lesions will recur due to the malalignment of the digit.  Patient is disinterested in surgical intervention.  Reappoint as needed  :  Assessment & Plan  Acquired deformity of right toe         Corns             History of Present Illness   HPI  Stormy Hermosillo is a 79 y.o. female who presents with painful soft corns present on the right great toe and right second toe.  Lesions have been present for over 1 year.  Patient tried topical acids to no avail.    Patient notes difficulty with surgeries in the past.  She had right knee replacement and developed DVT with pulmonary embolism.  She has also fallen over the past 1 year fracturing the left arm and requiring surgery.  Patient takes Eliquis on a regular basis.      Review of Systems   Cardiovascular:         History of pulmonary embolism.   Gastrointestinal:         GERD   Musculoskeletal:  Positive for arthralgias.              Objective   Resp 18   Ht 5' 4\" (1.626 m)   Wt 79.4 kg (175 lb)   BMI 30.04 kg/m²      Physical Exam  Constitutional:       Appearance: Normal appearance.   Cardiovascular:      Pulses: Normal pulses.   Musculoskeletal:         General: Deformity present.      Comments: Right second toe is slightly medially deviated.   Skin:     Findings: Lesion present.      Comments: Soft corn present lateral aspect PIPJ right hallux and medial aspect PIPJ right second toe.   Neurological:      General: No focal deficit present.      Mental Status: She is oriented to person, place, and time.               "

## 2025-03-14 ENCOUNTER — HOSPITAL ENCOUNTER (OUTPATIENT)
Dept: RADIOLOGY | Facility: HOSPITAL | Age: 80
End: 2025-03-14
Attending: STUDENT IN AN ORGANIZED HEALTH CARE EDUCATION/TRAINING PROGRAM
Payer: COMMERCIAL

## 2025-03-14 ENCOUNTER — OFFICE VISIT (OUTPATIENT)
Dept: OBGYN CLINIC | Facility: CLINIC | Age: 80
End: 2025-03-14
Payer: COMMERCIAL

## 2025-03-14 DIAGNOSIS — G56.01 CARPAL TUNNEL SYNDROME OF RIGHT WRIST: ICD-10-CM

## 2025-03-14 DIAGNOSIS — S52.572G OTHER CLOSED INTRA-ARTICULAR FRACTURE OF DISTAL END OF LEFT RADIUS WITH DELAYED HEALING, SUBSEQUENT ENCOUNTER: ICD-10-CM

## 2025-03-14 DIAGNOSIS — S52.572G OTHER CLOSED INTRA-ARTICULAR FRACTURE OF DISTAL END OF LEFT RADIUS WITH DELAYED HEALING, SUBSEQUENT ENCOUNTER: Primary | ICD-10-CM

## 2025-03-14 DIAGNOSIS — M65.341 TRIGGER RING FINGER OF RIGHT HAND: ICD-10-CM

## 2025-03-14 PROCEDURE — 73110 X-RAY EXAM OF WRIST: CPT

## 2025-03-14 PROCEDURE — 99214 OFFICE O/P EST MOD 30 MIN: CPT | Performed by: STUDENT IN AN ORGANIZED HEALTH CARE EDUCATION/TRAINING PROGRAM

## 2025-03-14 PROCEDURE — 20526 THER INJECTION CARP TUNNEL: CPT | Performed by: STUDENT IN AN ORGANIZED HEALTH CARE EDUCATION/TRAINING PROGRAM

## 2025-03-14 PROCEDURE — 20550 NJX 1 TENDON SHEATH/LIGAMENT: CPT | Performed by: STUDENT IN AN ORGANIZED HEALTH CARE EDUCATION/TRAINING PROGRAM

## 2025-03-14 RX ORDER — ROPIVACAINE HYDROCHLORIDE 5 MG/ML
1 INJECTION, SOLUTION EPIDURAL; INFILTRATION; PERINEURAL
Status: COMPLETED | OUTPATIENT
Start: 2025-03-14 | End: 2025-03-14

## 2025-03-14 RX ORDER — BETAMETHASONE SODIUM PHOSPHATE AND BETAMETHASONE ACETATE 3; 3 MG/ML; MG/ML
6 INJECTION, SUSPENSION INTRA-ARTICULAR; INTRALESIONAL; INTRAMUSCULAR; SOFT TISSUE
Status: COMPLETED | OUTPATIENT
Start: 2025-03-14 | End: 2025-03-14

## 2025-03-14 RX ORDER — ALENDRONATE SODIUM 70 MG
70 TABLET ORAL
COMMUNITY
Start: 2025-02-19

## 2025-03-14 RX ADMIN — ROPIVACAINE HYDROCHLORIDE 1 ML: 5 INJECTION, SOLUTION EPIDURAL; INFILTRATION; PERINEURAL at 09:15

## 2025-03-14 RX ADMIN — BETAMETHASONE SODIUM PHOSPHATE AND BETAMETHASONE ACETATE 6 MG: 3; 3 INJECTION, SUSPENSION INTRA-ARTICULAR; INTRALESIONAL; INTRAMUSCULAR; SOFT TISSUE at 09:15

## 2025-03-14 NOTE — PROGRESS NOTES
ORTHOPAEDIC HAND, WRIST, AND ELBOW OFFICE  VISIT      ASSESSMENT/PLAN:      Assessment & Plan  Other closed intra-articular fracture of distal end of left radius with delayed healing, subsequent encounter  X-rays were reviewed which demonstrate a healed distal radius fracture.  She has no restrictions at this time.  She is doing well at this time and is happy with her outcome of her.  Recommend continued follow up with Dr. Dewey (rheumatologist) for osteoporosis management. She is on Fosamax.    s/p spanning plate on 6/26/2024 and s/p SHANELL L wrist 8/14/24.   Orders:    XR wrist 3+ vw left; Future    Trigger ring finger of right hand  Discussed non operative versus surgical intervention.  The patient elected to proceed with non operative treatment options.  She is not interested in surgical intervention at this time.  She consented and underwent a right ring trigger finger injection in the office today without any complications.   Orders:    Hand/upper extremity injection: R ring A1    Carpal tunnel syndrome of right wrist  Discussed non operative versus surgical intervention.  The patient elected to proceed with non operative treatment options.  She is not interested in surgical intervention at this time.  She was instructed to use a cock-up wrist brace at night.  She consented and underwent a right carpal tunnel steroid injection in the office today without any complications.   Orders:    Hand/upper extremity injection: R carpal tunnel          Follow Up:  6 weeks       To Do Next Visit:  Re-evaluation of current issue      Discussions:  Carpal Tunnel Syndrome: The anatomy and physiology of carpal tunnel syndrome was discussed with the patient today.  Increase pressure localized under the transverse carpal ligament can cause pain, numbness, tingling, or dysesthesias within the median nerve distribution as well as feelings of fatigue, clumsiness, or awkwardness.  These symptoms typically occur at night and worse in  the morning upon waking.  Eventually, untreated carpal tunnel syndrome can result in weakness and permanent loss of muscle within the thenar compartment of the hand.  Treatment options were discussed with the patient.  Conservative treatment includes nocturnal resting splints to keep the nerve in a neutral position, ergonomic changes within the work or home environment, activity modification, and tendon gliding exercises. Vitamin B6 one tablet daily over the counter may helpful to reduce symptoms.   Steroid injections within the carpal canal can help a majority of patients, however this is often self-limited in a majority of patients.  Surgical intervention to divide the transverse carpal ligament typically results in a long-lasting relief of the patient's complaints, with the recurrence rate of less than 1%.                                                                                                                                                                          and Trigger Finger: The anatomy and physiology of trigger finger was discussed with the patient today in the office.  Edema and increased contact pressure within the flexor tendons at the A1 pulley can cause pain, crepitation, and triggering or locking of the digit resulting in limitation of function.  Symptoms can occur at anytime but are typically worse in the morning or after a brief rest from repetitive activity.  Treatment options include resting/nighttime MP blocking splints to decrease edema, oral anti-inflammatory medications, home or formal therapy exercises, up to 2 steroid injections within the tendon sheath, or surgical release.  While majority of patients do respond to conservative treatment, up to 20% may require surgical release.       Rick Smith MD  Attending, Orthopaedic Surgery  Hand, Wrist, and Elbow Surgery  [facility]    ______________________________________________________________________________________________    CHIEF COMPLAINT:  Chief Complaint   Patient presents with    Left Wrist - Follow-up    Right Wrist - Numbness       SUBJECTIVE:  Patient is a 79 y.o. RHD female who presents today for follow up of  left distal radius fracture s/p spanning plate on 2024 and s/p SHANELL L wrist 24. The patient states she is doing well. She denies any pain or complaints. She is able to function as normal with her wrist. She states overall, she is happy with her outcome. On the right, she notes numbness and tingling to her index, long, and ring fingers. She states this is intermittent and has been ongoing for the past 3 months. She notes increased numbness and tingling when driving and she will have to shake out her hand. She also states this wakes her from sleep at night. She notes right ring finger triggering that has been going on for the past few months.         I have personally reviewed all the relevant PMH, PSH, SH, FH, Medications and allergies      PAST MEDICAL HISTORY:  Past Medical History:   Diagnosis Date    Atrial flutter (HCC)     Deep vein thrombosis (HCC)     Hearing aid worn     Pulmonary embolism (HCC)        PAST SURGICAL HISTORY:  Past Surgical History:   Procedure Laterality Date    BACK SURGERY      BREAST SURGERY       SECTION      x3    FOOT SURGERY Bilateral     HIP SURGERY Left     JOINT REPLACEMENT      KNEE SURGERY Bilateral     NE NDSC WRST SURG W/RLS TRANSVRS CARPL LIGM Left 2024    Procedure: RELEASE CARPAL TUNNEL ENDOSCOPIC;  Surgeon: Rick Smith MD;  Location: [facility] MAIN OR;  Service: Orthopedics    NE OPTX DSTL RADL I-ARTIC FX/EPIPHYSL SEP 3+ FRAG Left 2024    Procedure: OPEN REDUCTION W/ INTERNAL FIXATION (ORIF) RADIUS  (WRIST);  Surgeon: Rick Smith MD;  Location: [facility] MAIN OR;  Service: Orthopedics    NE REMOVAL IMPLANT DEEP Left 2024     Procedure: REMOVAL HARDWARE LEFT WRIST;  Surgeon: Rick Smith MD;  Location: [facility] MAIN OR;  Service: Orthopedics       FAMILY HISTORY:  Family History   Problem Relation Age of Onset    Heart disease Mother     Cancer Mother     Varicose Veins Mother     Heart disease Father     Heart disease Sister     Cancer Sister     Cancer Brother     Varicose Veins Maternal Grandmother        SOCIAL HISTORY:  Social History     Tobacco Use    Smoking status: Never    Smokeless tobacco: Never   Vaping Use    Vaping status: Never Used   Substance Use Topics    Alcohol use: Yes     Comment: rarely    Drug use: No       MEDICATIONS:    Current Outpatient Medications:     acetaminophen (TYLENOL) 500 mg tablet, Take 500 mg by mouth every 6 (six) hours, Disp: , Rfl:     amLODIPine (NORVASC) 5 mg tablet, Take 5 mg by mouth daily, Disp: , Rfl:     aspirin 81 MG tablet, Take 81 mg by mouth 2 (two) times a day, Disp: , Rfl:     atorvastatin (LIPITOR) 10 mg tablet, Take 10 mg by mouth daily, Disp: , Rfl: 0    BIOTIN PO, Take by mouth, Disp: , Rfl:     Calcium Carbonate-Vitamin D (CALCIUM 600+D PO), Take by mouth, Disp: , Rfl:     Fosamax 70 MG tablet, Take 70 mg by mouth, Disp: , Rfl:     guaiFENesin (MUCINEX) 600 mg 12 hr tablet, Take 600 mg by mouth, Disp: , Rfl:     metoprolol succinate (TOPROL-XL) 25 mg 24 hr tablet, Take 1 tablet (25 mg total) by mouth daily, Disp: 30 tablet, Rfl: 0    mometasone (NASONEX) 50 mcg/act nasal spray, 2 sprays daily, Disp: , Rfl:     Multiple Vitamins-Minerals (MULTIVITAMIN ADULT PO), Take 1 tablet by mouth, Disp: , Rfl:     omeprazole (PriLOSEC) 20 mg delayed release capsule, Take 20 mg by mouth daily, Disp: , Rfl:     apixaban (Eliquis) 2.5 mg, Take 1 tablet (2.5 mg total) by mouth 2 (two) times a day, Disp: 180 tablet, Rfl: 0    oxyCODONE (ROXICODONE) 5 immediate release tablet, Take 1 tablet (5 mg total) by mouth every 6 (six) hours as needed for moderate pain or severe pain for up to 8 doses  Cont of tx Max Daily Amount: 20 mg (Patient not taking: Reported on 8/23/2024), Disp: 8 tablet, Rfl: 0    Current Facility-Administered Medications:     bupivacaine (MARCAINE) 0.25 % injection 0.5 mL, 0.5 mL, Injection, , Bharath Mckeon Uribe, DPM, 0.5 mL at 12/14/22 4365    ALLERGIES:  Allergies   Allergen Reactions    Tramadol GI Intolerance           REVIEW OF SYSTEMS:  Musculoskeletal:        As noted in HPI.   All other systems reviewed and are negative.    VITALS:  There were no vitals filed for this visit.    LABS:  HgA1c:   Lab Results   Component Value Date    HGBA1C 5.7 (H) 10/11/2024     BMP:   Lab Results   Component Value Date    CALCIUM 9.3 01/09/2025    K 4.5 01/09/2025    CO2 30 01/09/2025     01/09/2025    BUN 17 01/09/2025    CREATININE 0.88 01/09/2025       _____________________________________________________  PHYSICAL EXAMINATION:  General: Well developed and well nourished, alert & oriented x 3, appears comfortable  Psychiatric: Normal  HEENT: Normocephalic, Atraumatic Trachea Midline, No torticollis  Pulmonary: No audible wheezing or respiratory distress   Abdomen/GI: Non tender, non distended   Cardiovascular: No pitting edema, 2+ radial pulse   Skin: No masses, erythema, lacerations, fluctation, ulcerations  Neurovascular: Sensation Intact to the Median, Ulnar, Radial Nerve, Motor Intact to the Median, Ulnar, Radial Nerve, and Pulses Intact  Musculoskeletal: Normal, except as noted in detailed exam and in HPI.      MUSCULOSKELETAL EXAMINATION:  Left wrist  Flexion 45  Extension 55  Full digit ext  Full composite fist  - tinel's at the wrist  No swelling  No pain with wrist ROM  Supination 75, pronation 75    Right wrist  Painless wrist flexion and extension  Full composite fist  + locking ring finger  + tinel's at the wrist  Mild thenar atrophy  + Phalen's  - tinel's at the elbow  No ulnar nerve subluxation   ___________________________________________________  STUDIES REVIEWED:  Patrick of  the left wrist were reviewed and independently interpreted in PACS by Dr. Smith and demonstrate healed prior distal radius fracture and index metacarpal fracture. Progressive healing compared to prior films.          PROCEDURES PERFORMED:  Hand/upper extremity injection: R ring A1  Universal Protocol:  procedure performed by consultantConsent: Verbal consent obtained.  Consent given by: patient  Patient identity confirmed: verbally with patient  Supporting Documentation  Indications: pain   Procedure Details  Condition:trigger finger Location: ring finger - R ring A1   Needle size: 25 G  Ultrasound guidance: no  Medications administered: 6 mg betamethasone acetate-betamethasone sodium phosphate 6 (3-3) mg/mL; 1 mL ropivacaine 0.5 %  Patient tolerance: patient tolerated the procedure well with no immediate complications  Dressing:  Sterile dressing applied       Hand/upper extremity injection: R carpal tunnel  [place] Protocol:  procedure performed by consultantConsent: Verbal consent obtained.  Consent given by: patient  Patient identity confirmed: verbally with patient  Supporting Documentation  Indications: pain   Procedure Details  Condition:carpal tunnel syndrome Site: R carpal tunnel   Needle size: 25 G  Ultrasound guidance: no  Medications administered: 6 mg betamethasone acetate-betamethasone sodium phosphate 6 (3-3) mg/mL; 1 mL ropivacaine 0.5 %  Patient tolerance: patient tolerated the procedure well with no immediate complications  Dressing:  Sterile dressing applied         -    _____________________________________________________      Scribe Attestation      I,:  Marcy Amaya MA am acting as a scribe while in the presence of the attending physician.:       I,:  Rick Smith MD personally performed the services described in this documentation    as scribed in my presence.:

## 2025-04-14 ENCOUNTER — APPOINTMENT (OUTPATIENT)
Dept: RADIOLOGY | Facility: HOSPITAL | Age: 80
End: 2025-04-14
Payer: COMMERCIAL

## 2025-04-14 ENCOUNTER — HOSPITAL ENCOUNTER (EMERGENCY)
Facility: HOSPITAL | Age: 80
Discharge: HOME | End: 2025-04-14
Attending: EMERGENCY MEDICINE
Payer: COMMERCIAL

## 2025-04-14 VITALS
OXYGEN SATURATION: 94 % | SYSTOLIC BLOOD PRESSURE: 142 MMHG | DIASTOLIC BLOOD PRESSURE: 76 MMHG | RESPIRATION RATE: 18 BRPM | HEART RATE: 71 BPM | TEMPERATURE: 98.5 F

## 2025-04-14 DIAGNOSIS — S86.911A STRAIN OF RIGHT KNEE, INITIAL ENCOUNTER: Primary | ICD-10-CM

## 2025-04-14 PROCEDURE — 96372 THER/PROPH/DIAG INJ SC/IM: CPT

## 2025-04-14 PROCEDURE — 99284 EMERGENCY DEPT VISIT MOD MDM: CPT

## 2025-04-14 PROCEDURE — 99283 EMERGENCY DEPT VISIT LOW MDM: CPT

## 2025-04-14 PROCEDURE — 73564 X-RAY EXAM KNEE 4 OR MORE: CPT

## 2025-04-14 RX ORDER — KETOROLAC TROMETHAMINE 30 MG/ML
15 INJECTION, SOLUTION INTRAMUSCULAR; INTRAVENOUS ONCE
Status: COMPLETED | OUTPATIENT
Start: 2025-04-14 | End: 2025-04-14

## 2025-04-14 RX ADMIN — KETOROLAC TROMETHAMINE 15 MG: 30 INJECTION, SOLUTION INTRAMUSCULAR; INTRAVENOUS at 19:27

## 2025-04-14 NOTE — DISCHARGE INSTRUCTIONS
Take ibuprofen and Tylenol as needed for pain.  Rest, elevate, ice the knee.  Call Ortho doctor and follow-up with them this week.  May also use a lidocaine patch or Voltaren gel on the knee.

## 2025-04-14 NOTE — ED ATTENDING ATTESTATION
4/14/2025  I, Sánchez Naranjo MD, saw and evaluated the patient. I have discussed the patient with the resident/non-physician practitioner and agree with the resident's/non-physician practitioner's findings, Plan of Care, and MDM as documented in the resident's/non-physician practitioner's note, except where noted. All available labs and Radiology studies were reviewed.  I was present for key portions of any procedure(s) performed by the resident/non-physician practitioner and I was immediately available to provide assistance.       At this point I agree with the current assessment done in the Emergency Department.  I have conducted an independent evaluation of this patient a history and physical is as follows:    79-year-old female with a history of bilateral total knee replacement presented for evaluation of right knee pain after she twisted it getting out of the car this afternoon.  States she was able to ambulate holding onto a cart at [employer] and do her regular shopping, then able to get home and when she got home she had difficulty bearing weight secondary to pain around the knee.  She has pain in a bandlike fashion just inferior to the joint line.  There are some generalized edema and tenderness to the area.  Range of motion is mildly restricted due to pain.  Quadriceps and patellar tendons are intact.     ED Course     X-ray shows surgical hardware with otherwise no acute changes.    Suspect strain of the knee.  Plan NSAIDs, rest, elevation, ice, ambulation with walker, and orthopedic follow-up when able.    Critical Care Time  Procedures

## 2025-04-14 NOTE — ED PROVIDER NOTES
Time reflects when diagnosis was documented in both MDM as applicable and the Disposition within this note       Time User Action Codes Description Comment    4/14/2025  7:10 PM Lisa Bajwa Add [S86.911A] Strain of right knee, initial encounter           ED Disposition       ED Disposition   Discharge    Condition   Stable    Date/Time   Mon Apr 14, 2025  7:08 PM    Comment   Stormy OLMEDO Jaimee discharge to home/self care.                   Assessment & Plan       Medical Decision Making  78 y/o female s/p right knee replacement one year ago here for right knee pain after turning her leg to step out of the car.  She was seated while it happened, and felt a pop in her knee.  Cannot put weight on the leg.  Right knee is significantly swollen compared to the left, it is not erythematous or warmer to the touch.  She did not fall or hit her head.  She was able to ambulate around the store after injuring her knee.  She denies having any symptoms or difficulty with the knee prior to the incident.  She is able to flex and extend the knee on exam.  Last kidney function was good, will give a shot of Toradol here.  Discussed supportive care, pain management, follow-up with Ortho, risks and return precautions.  Patient was agreeable to plan and was discharged home.    Risk  Prescription drug management.        ED Course as of 04/14/25 1918 Mon Apr 14, 2025 1916 XR knee 4+ views RIGHT  Negative for acute bony abnormality, hardware seems to be in place per independent interpretation       Medications   ketorolac (TORADOL) injection 15 mg (has no administration in time range)       ED Risk Strat Scores                    No data recorded        SBIRT 20yo+      Flowsheet Row Most Recent Value   Initial Alcohol Screen: US AUDIT-C     1. How often do you have a drink containing alcohol? 0 Filed at: 04/14/2025 2791   2. How many drinks containing alcohol do you have on a typical day you are drinking?  0 Filed at: 04/14/2025     3a. Male UNDER 65: How often do you have five or more drinks on one occasion? 0 Filed at: 2025   3b. FEMALE Any Age, or MALE 65+: How often do you have 4 or more drinks on one occassion? 0 Filed at: 2025   Audit-C Score 0 Filed at: 2025   ANASTASIYA: How many times in the past year have you...    Used an illegal drug or used a prescription medication for non-medical reasons? Never Filed at: 2025                            History of Present Illness       Chief Complaint   Patient presents with    Knee Pain     Pt reports knee replacement on right knee last year. Reports leaving the car and hearing a pop. Pain when trying to ambulate       Past Medical History:   Diagnosis Date    Atrial flutter (HCC)     Deep vein thrombosis (HCC)     Hearing aid worn     Pulmonary embolism (HCC)       Past Surgical History:   Procedure Laterality Date    BACK SURGERY      BREAST SURGERY       SECTION      x3    FOOT SURGERY Bilateral     HIP SURGERY Left     JOINT REPLACEMENT      KNEE SURGERY Bilateral     CO NDSC WRST SURG W/RLS TRANSVRS CARPL LIGM Left 2024    Procedure: RELEASE CARPAL TUNNEL ENDOSCOPIC;  Surgeon: Rick Smith MD;  Location: [facility] MAIN OR;  Service: Orthopedics    CO OPTX DSTL RADL I-ARTIC FX/EPIPHYSL SEP 3+ FRAG Left 2024    Procedure: OPEN REDUCTION W/ INTERNAL FIXATION (ORIF) RADIUS  (WRIST);  Surgeon: Rick Smith MD;  Location: [facility] MAIN OR;  Service: Orthopedics    CO REMOVAL IMPLANT DEEP Left 2024    Procedure: REMOVAL HARDWARE LEFT WRIST;  Surgeon: Rick Smith MD;  Location: [facility] MAIN OR;  Service: Orthopedics      Family History   Problem Relation Age of Onset    Heart disease Mother     Cancer Mother     Varicose Veins Mother     Heart disease Father     Heart disease Sister     Cancer Sister     Cancer Brother     Varicose Veins Maternal Grandmother       Social History     Tobacco Use    Smoking status:  Never    Smokeless tobacco: Never   Vaping Use    Vaping status: Never Used   Substance Use Topics    Alcohol use: Yes     Comment: rarely    Drug use: No      E-Cigarette/Vaping    E-Cigarette Use Never User       E-Cigarette/Vaping Substances    Nicotine No     THC No     CBD No     Flavoring No     Other No     Unknown No       I have reviewed and agree with the history as documented.     Patient is an 78 y/o female s/p right knee replacement one year ago here for right knee pain after turning her leg to step out of the car.  She was seated while it happened, and felt a pop in her knee.  Cannot put weight on the leg.  Right knee is significantly swollen compared to the left, it is not erythematous or warm.  She did not fall or hit her head.  She was able to ambulate around the store after injuring her knee.  She denies having any symptoms or difficulty with the knee prior to the incident.      Knee Pain  Associated symptoms: no back pain, no fever and no neck pain        Review of Systems   Constitutional:  Negative for chills and fever.   HENT:  Negative for congestion, ear pain, rhinorrhea and sore throat.    Eyes:  Negative for pain and visual disturbance.   Respiratory:  Negative for cough and shortness of breath.    Cardiovascular:  Negative for chest pain and palpitations.   Gastrointestinal:  Negative for abdominal pain, diarrhea, nausea and vomiting.   Genitourinary:  Negative for dysuria and hematuria.   Musculoskeletal:  Positive for arthralgias. Negative for back pain, myalgias, neck pain and neck stiffness.   Skin:  Negative for color change and rash.   Neurological:  Negative for dizziness, seizures, syncope, weakness, light-headedness and headaches.   All other systems reviewed and are negative.      Objective       ED Triage Vitals [04/14/25 1651]   Temperature Pulse Blood Pressure Respirations SpO2 Patient Position - Orthostatic VS   98.5 °F (36.9 °C) 71 142/76 18 94 % Sitting      Temp Source  Heart Rate Source BP Location FiO2 (%) Pain Score    Oral Monitor Right arm -- --      Vitals      Date and Time Temp Pulse SpO2 Resp BP Pain Score FACES Pain Rating User   04/14/25 1651 98.5 °F (36.9 °C) 71 94 % 18 142/76 -- -- AK            Physical Exam  Vitals and nursing note reviewed.   Constitutional:       General: She is not in acute distress.     Appearance: Normal appearance. She is not ill-appearing, toxic-appearing or diaphoretic.   HENT:      Head: Normocephalic and atraumatic.   Cardiovascular:      Rate and Rhythm: Normal rate and regular rhythm.      Pulses: Normal pulses.      Heart sounds: Normal heart sounds.   Pulmonary:      Effort: Pulmonary effort is normal. No tachypnea, accessory muscle usage or respiratory distress.      Breath sounds: Normal breath sounds. No stridor. No wheezing, rhonchi or rales.   Chest:      Chest wall: No tenderness.   Abdominal:      General: Abdomen is flat. There is no distension.      Palpations: Abdomen is soft.      Tenderness: There is no abdominal tenderness. There is no guarding or rebound.   Musculoskeletal:      Cervical back: Normal range of motion and neck supple.      Right hip: Normal.      Left hip: Normal.      Right knee: Swelling present. No erythema or ecchymosis. Tenderness present.      Left knee: Normal.      Right ankle: Normal.      Left ankle: Normal.   Skin:     General: Skin is warm and dry.      Capillary Refill: Capillary refill takes less than 2 seconds.   Neurological:      Mental Status: She is alert.   Psychiatric:         Mood and Affect: Mood normal.         Behavior: Behavior normal.         Thought Content: Thought content normal.         Judgment: Judgment normal.         Results Reviewed       None            XR knee 4+ views RIGHT    (Results Pending)       Procedures    ED Medication and Procedure Management   Prior to Admission Medications   Prescriptions Last Dose Informant Patient Reported? Taking?   BIOTIN PO  Self Yes No    Sig: Take by mouth   Calcium Carbonate-Vitamin D (CALCIUM 600+D PO)  Self Yes No   Sig: Take by mouth   Fosamax 70 MG tablet   Yes No   Sig: Take 70 mg by mouth   Multiple Vitamins-Minerals (MULTIVITAMIN ADULT PO)  Self Yes No   Sig: Take 1 tablet by mouth   acetaminophen (TYLENOL) 500 mg tablet  Self Yes No   Sig: Take 500 mg by mouth every 6 (six) hours   amLODIPine (NORVASC) 5 mg tablet  Self Yes No   Sig: Take 5 mg by mouth daily   apixaban (Eliquis) 2.5 mg  Self No No   Sig: Take 1 tablet (2.5 mg total) by mouth 2 (two) times a day   aspirin 81 MG tablet  Self Yes No   Sig: Take 81 mg by mouth 2 (two) times a day   atorvastatin (LIPITOR) 10 mg tablet  Self Yes No   Sig: Take 10 mg by mouth daily   guaiFENesin (MUCINEX) 600 mg 12 hr tablet  Self Yes No   Sig: Take 600 mg by mouth   metoprolol succinate (TOPROL-XL) 25 mg 24 hr tablet  Self No No   Sig: Take 1 tablet (25 mg total) by mouth daily   mometasone (NASONEX) 50 mcg/act nasal spray  Self Yes No   Si sprays daily   omeprazole (PriLOSEC) 20 mg delayed release capsule  Self Yes No   Sig: Take 20 mg by mouth daily   oxyCODONE (ROXICODONE) 5 immediate release tablet  Self No No   Sig: Take 1 tablet (5 mg total) by mouth every 6 (six) hours as needed for moderate pain or severe pain for up to 8 doses Cont of tx Max Daily Amount: 20 mg   Patient not taking: Reported on 2024      Facility-Administered Medications Last Administration Doses Remaining   bupivacaine (MARCAINE) 0.25 % injection 0.5 mL 2022  3:59 PM         Patient's Medications   Discharge Prescriptions    No medications on file     No discharge procedures on file.  ED SEPSIS DOCUMENTATION   Time reflects when diagnosis was documented in both MDM as applicable and the Disposition within this note       Time User Action Codes Description Comment    2025  7:10 PM Lisa Bajwa Add [S86.911A] Strain of right knee, initial encounter                  Lisa Bajwa  CARLOS  04/14/25 1918     Monitor for s/s aspiration/laryngeal penetration. If noted:  D/C p.o. intake, provide non-oral nutrition/hydration/meds, and contact this service @ [phone]/change of breathing pattern/cough/gurgly voice/fever/pneumonia/throat clearing/upper respiratory infection

## 2025-04-18 ENCOUNTER — TELEPHONE (OUTPATIENT)
Age: 80
End: 2025-04-18

## 2025-04-18 NOTE — TELEPHONE ENCOUNTER
Caller: Yumi Hermosillo    Doctor: Dr. Uribe/Emily    Reason for call: appt/checked chart/saw Dr Diaz this year, but wants to go back to Dr. Uribe whom she saw in 2022. Scheduled    Call back#: NA

## 2025-04-23 ENCOUNTER — HOSPITAL ENCOUNTER (EMERGENCY)
Facility: HOSPITAL | Age: 80
Discharge: HOME | End: 2025-04-23
Attending: EMERGENCY MEDICINE
Payer: COMMERCIAL

## 2025-04-23 VITALS
OXYGEN SATURATION: 95 % | DIASTOLIC BLOOD PRESSURE: 80 MMHG | TEMPERATURE: 98.1 F | RESPIRATION RATE: 18 BRPM | SYSTOLIC BLOOD PRESSURE: 169 MMHG | HEART RATE: 74 BPM

## 2025-04-23 DIAGNOSIS — M25.461 EFFUSION OF RIGHT KNEE: Primary | ICD-10-CM

## 2025-04-23 DIAGNOSIS — M25.561 ACUTE PAIN OF RIGHT KNEE: ICD-10-CM

## 2025-04-23 PROCEDURE — 99284 EMERGENCY DEPT VISIT MOD MDM: CPT

## 2025-04-23 PROCEDURE — 96372 THER/PROPH/DIAG INJ SC/IM: CPT

## 2025-04-23 PROCEDURE — 99283 EMERGENCY DEPT VISIT LOW MDM: CPT

## 2025-04-23 RX ORDER — NAPROXEN 500 MG/1
TABLET ORAL
Qty: 12 TABLET | Refills: 0 | Status: SHIPPED | OUTPATIENT
Start: 2025-04-23 | End: 2025-04-29

## 2025-04-23 RX ORDER — OXYCODONE HYDROCHLORIDE 5 MG/1
5 TABLET ORAL EVERY 6 HOURS PRN
Qty: 6 TABLET | Refills: 0 | Status: SHIPPED | OUTPATIENT
Start: 2025-04-23

## 2025-04-23 RX ORDER — ACETAMINOPHEN 500 MG
1000 TABLET ORAL 3 TIMES DAILY PRN
Qty: 30 TABLET | Refills: 0 | Status: SHIPPED | OUTPATIENT
Start: 2025-04-23 | End: 2025-04-28

## 2025-04-23 RX ORDER — KETOROLAC TROMETHAMINE 30 MG/ML
30 INJECTION, SOLUTION INTRAMUSCULAR; INTRAVENOUS ONCE
Status: COMPLETED | OUTPATIENT
Start: 2025-04-23 | End: 2025-04-23

## 2025-04-23 RX ADMIN — KETOROLAC TROMETHAMINE 30 MG: 30 INJECTION, SOLUTION INTRAMUSCULAR; INTRAVENOUS at 19:15

## 2025-04-23 NOTE — ED PROVIDER NOTES
Time reflects when diagnosis was documented in both MDM as applicable and the Disposition within this note       Time User Action Codes Description Comment    4/23/2025  7:02 PM Debbie Baird Add [M25.461] Effusion of right knee     4/23/2025  7:02 PM Debbie Baird Add [M25.561] Acute pain of right knee           ED Disposition       ED Disposition   Discharge    Condition   Stable    Date/Time   Wed Apr 23, 2025  7:02 PM    Comment   Stormy Hermosillo discharge to home/self care.                   Assessment & Plan       Medical Decision Making  DDX including but not limited to: arthritis, bursitis, tendinitis, sprain, effusion    Patient with atraumatic right knee pain persisting for 1 week, with associated effusion.  Exam without erythema or warmth to suggest cellulitis and I am able to range the joint without difficulty, doubt septic joint.  Do suspect effusion and level of arthritis.  Given patient's stability, will plan for discharge to home with Tylenol, very short course of NSAIDs, and as needed oxycodone until follow-up with orthopedics in 2 days.  The patient is able to walk with a cane.  Will have her rest, ice, compress.  Discussed strict return precautions and she demonstrates understanding which back method.  She and her  have no further questions or concerns at this time.    Problems Addressed:  Acute pain of right knee: acute illness or injury  Effusion of right knee: acute illness or injury    Amount and/or Complexity of Data Reviewed  Independent Historian: spouse    Risk  OTC drugs.  Prescription drug management.             Medications   ketorolac (TORADOL) injection 30 mg (30 mg Intramuscular Given 4/23/25 1915)       ED Risk Strat Scores                    No data recorded                            History of Present Illness       Chief Complaint   Patient presents with    Knee Pain     Pt reports she was seen here recently for knee pain; pt was told to followup with ortho but pt  returns for increased pain that she is requesting a shot for. Pt reports pain has been ongoing since knee was replaced 1 year ago.       Past Medical History:   Diagnosis Date    Atrial flutter (HCC)     Deep vein thrombosis (HCC)     Hearing aid worn     Pulmonary embolism (HCC)       Past Surgical History:   Procedure Laterality Date    BACK SURGERY      BREAST SURGERY       SECTION      x3    FOOT SURGERY Bilateral     HIP SURGERY Left     JOINT REPLACEMENT      KNEE SURGERY Bilateral     NV NDSC WRST SURG W/RLS TRANSVRS CARPL LIGM Left 2024    Procedure: RELEASE CARPAL TUNNEL ENDOSCOPIC;  Surgeon: Rick Smith MD;  Location: [facility] MAIN [facility];  Service: Orthopedics    NV OPTX DSTL RADL I-ARTIC FX/EPIPHYSL SEP 3+ FRAG Left 2024    Procedure: OPEN REDUCTION W/ INTERNAL FIXATION (ORIF) RADIUS  (WRIST);  Surgeon: Rick Smith MD;  Location: [facility] MA[facility];  Service: Orthopedics    NV REMOVAL IMPLANT DEEP Left 2024    Procedure: REMOVAL HARDWARE LEFT WRIST;  Surgeon: Rick Smith MD;  Location: [facility];  Service: Orthopedics      Family History   Problem Relation Age of Onset    Heart disease Mother     Cancer Mother     Varicose Veins Mother     Heart disease Father     Heart disease Sister     Cancer Sister     Cancer Brother     Varicose Veins Maternal Grandmother       Social History     Tobacco Use    Smoking status: Never    Smokeless tobacco: Never   Vaping Use    Vaping status: Never Used   Substance Use Topics    Alcohol use: Yes     Comment: rarely    Drug use: No      E-Cigarette/Vaping    E-Cigarette Use Never User       E-Cigarette/Vaping Substances    Nicotine No     THC No     CBD No     Flavoring No     Other No     Unknown No       I have reviewed and agree with the history as documented.     The patient is an 79-year-old female presenting for evaluation of right knee pain.  The patient reports for the past week having relatively persistent right knee  pain.  She was seen here and given a dose of Toradol and did have good relief of her pain up until the last day or 2.  She notes the right knee has been swollen and has been more difficult to walk on due to pain.  She denies fever, redness, warmth, and rash.  She does report a prior right knee replacement 1 year ago.  She does have follow-up with orthopedics in 2 days for this pain but due to difficulty walking she wanted to get checked back out.      History provided by:  Patient   used: No        Review of Systems   Musculoskeletal:  Positive for arthralgias (Right knee) and joint swelling (Right knee).   All other systems reviewed and are negative.          Objective       ED Triage Vitals   Temperature Pulse Blood Pressure Respirations SpO2 Patient Position - Orthostatic VS   04/23/25 1833 04/23/25 1833 04/23/25 1833 04/23/25 1833 04/23/25 1833 04/23/25 1833   98.1 °F (36.7 °C) 74 169/80 18 95 % Sitting      Temp Source Heart Rate Source BP Location FiO2 (%) Pain Score    04/23/25 1833 04/23/25 1833 04/23/25 1833 -- 04/23/25 1915    Oral Monitor Right arm  9      Vitals      Date and Time Temp Pulse SpO2 Resp BP Pain Score FACES Pain Rating User   04/23/25 1915 -- -- -- -- -- 9 -- MD   04/23/25 1833 98.1 °F (36.7 °C) 74 95 % 18 169/80 -- -- KD            Physical Exam  Vitals and nursing note reviewed.   Constitutional:       General: She is not in acute distress.     Appearance: Normal appearance. She is normal weight. She is not ill-appearing, toxic-appearing or diaphoretic.   HENT:      Head: Normocephalic and atraumatic.      Nose: Nose normal.      Mouth/Throat:      Mouth: Mucous membranes are moist.      Pharynx: Oropharynx is clear.   Eyes:      Extraocular Movements: Extraocular movements intact.      Conjunctiva/sclera: Conjunctivae normal.   Cardiovascular:      Rate and Rhythm: Normal rate.   Pulmonary:      Effort: Pulmonary effort is normal. No respiratory distress.    Musculoskeletal:      Cervical back: Normal range of motion and neck supple.      Right hip: Normal. No deformity, tenderness or bony tenderness. Normal range of motion. Normal strength.      Right upper leg: Normal. No swelling, edema, deformity or bony tenderness.      Right knee: Swelling and effusion present. No deformity, erythema, ecchymosis or crepitus. Decreased range of motion (Able to flex knee to 90 degrees with slight difficulty secondary to pain able to fully extend without difficulty). Tenderness present over the medial joint line and lateral joint line. No LCL laxity, MCL laxity, ACL laxity or PCL laxity. Normal alignment and normal patellar mobility.      Left knee: Normal.      Right lower leg: Normal.      Left lower leg: Normal.      Right ankle: Normal.      Left ankle: Normal.      Right foot: Normal.      Left foot: Normal.      Comments: Well-healed surgical incisions to anterior aspects of right and left knees.  Right knee without erythema or warmth.   Skin:     General: Skin is warm and dry.      Capillary Refill: Capillary refill takes less than 2 seconds.   Neurological:      General: No focal deficit present.      Mental Status: She is alert and oriented to person, place, and time. Mental status is at baseline.         Results Reviewed       None            No orders to display       Procedures    ED Medication and Procedure Management   Prior to Admission Medications   Prescriptions Last Dose Informant Patient Reported? Taking?   BIOTIN PO  Self Yes No   Sig: Take by mouth   Calcium Carbonate-Vitamin D (CALCIUM 600+D PO)  Self Yes No   Sig: Take by mouth   Fosamax 70 MG tablet   Yes No   Sig: Take 70 mg by mouth   Multiple Vitamins-Minerals (MULTIVITAMIN ADULT PO)  Self Yes No   Sig: Take 1 tablet by mouth   acetaminophen (TYLENOL) 500 mg tablet  Self Yes No   Sig: Take 500 mg by mouth every 6 (six) hours   amLODIPine (NORVASC) 5 mg tablet  Self Yes No   Sig: Take 5 mg by mouth daily    apixaban (Eliquis) 2.5 mg  Self No No   Sig: Take 1 tablet (2.5 mg total) by mouth 2 (two) times a day   aspirin 81 MG tablet  Self Yes No   Sig: Take 81 mg by mouth 2 (two) times a day   atorvastatin (LIPITOR) 10 mg tablet  Self Yes No   Sig: Take 10 mg by mouth daily   guaiFENesin (MUCINEX) 600 mg 12 hr tablet  Self Yes No   Sig: Take 600 mg by mouth   metoprolol succinate (TOPROL-XL) 25 mg 24 hr tablet  Self No No   Sig: Take 1 tablet (25 mg total) by mouth daily   mometasone (NASONEX) 50 mcg/act nasal spray  Self Yes No   Si sprays daily   omeprazole (PriLOSEC) 20 mg delayed release capsule  Self Yes No   Sig: Take 20 mg by mouth daily   oxyCODONE (ROXICODONE) 5 immediate release tablet  Self No No   Sig: Take 1 tablet (5 mg total) by mouth every 6 (six) hours as needed for moderate pain or severe pain for up to 8 doses Cont of tx Max Daily Amount: 20 mg   Patient not taking: Reported on 2024      Facility-Administered Medications Last Administration Doses Remaining   bupivacaine (MARCAINE) 0.25 % injection 0.5 mL 2022  3:59 PM         Discharge Medication List as of 2025  7:12 PM        START taking these medications    Details   !! acetaminophen (TYLENOL) 500 mg tablet Take 2 tablets (1,000 mg total) by mouth 3 (three) times a day as needed for mild pain or moderate pain for up to 5 days, Starting 2025, Until 2025 at 2359, Normal      naproxen (Naprosyn) 500 mg tablet Multiple Dosages:Starting 2025, Until 2025 at 2359, THEN Starting Sat 2025, Until 2025 at 2359Take 1 tablet (500 mg total) by mouth 2 (two) times a day with meals for 3 days, THEN 1 tablet (500 mg total) 2 (two) times a  day as needed for mild pain or moderate pain for up to 3 days., Normal      !! oxyCODONE (Roxicodone) 5 immediate release tablet Take 1 tablet (5 mg total) by mouth every 6 (six) hours as needed for severe pain Max Daily Amount: 20 mg, Starting Wed 2025,  Normal       !! - Potential duplicate medications found. Please discuss with provider.        CONTINUE these medications which have NOT CHANGED    Details   !! acetaminophen (TYLENOL) 500 mg tablet Take 500 mg by mouth every 6 (six) hours, Historical Med      amLODIPine (NORVASC) 5 mg tablet Take 5 mg by mouth daily, Starting Tue 8/13/2024, Historical Med      apixaban (Eliquis) 2.5 mg Take 1 tablet (2.5 mg total) by mouth 2 (two) times a day, Starting Wed 7/17/2024, Until Tue 10/15/2024, Normal      aspirin 81 MG tablet Take 81 mg by mouth 2 (two) times a day, Starting Thu 4/13/2017, Historical Med      atorvastatin (LIPITOR) 10 mg tablet Take 10 mg by mouth daily, Starting Thu 10/25/2018, Historical Med      BIOTIN PO Take by mouth, Historical Med      Calcium Carbonate-Vitamin D (CALCIUM 600+D PO) Take by mouth, Historical Med      Fosamax 70 MG tablet Take 70 mg by mouth, Starting Wed 2/19/2025, Historical Med      guaiFENesin (MUCINEX) 600 mg 12 hr tablet Take 600 mg by mouth, Historical Med      metoprolol succinate (TOPROL-XL) 25 mg 24 hr tablet Take 1 tablet (25 mg total) by mouth daily, Starting Thu 3/21/2024, Normal      mometasone (NASONEX) 50 mcg/act nasal spray 2 sprays daily, Starting Wed 1/24/2024, Historical Med      Multiple Vitamins-Minerals (MULTIVITAMIN ADULT PO) Take 1 tablet by mouth, Historical Med      omeprazole (PriLOSEC) 20 mg delayed release capsule Take 20 mg by mouth daily, Starting Thu 11/17/2022, Historical Med      !! oxyCODONE (ROXICODONE) 5 immediate release tablet Take 1 tablet (5 mg total) by mouth every 6 (six) hours as needed for moderate pain or severe pain for up to 8 doses Cont of tx Max Daily Amount: 20 mg, Starting Wed 8/14/2024, Normal       !! - Potential duplicate medications found. Please discuss with provider.        No discharge procedures on file.  ED SEPSIS DOCUMENTATION   Time reflects when diagnosis was documented in both MDM as applicable and the Disposition  within this note       Time User Action Codes Description Comment    4/23/2025  7:02 PM Debbie Baird [M25.461] Effusion of right knee     4/23/2025  7:02 PM Debbie Baird [M25.561] Acute pain of right knee                  MARIANNA Pulido  04/23/25 2045

## 2025-04-23 NOTE — DISCHARGE INSTRUCTIONS
Go to your scheduled follow-up with Dr. Kyle from orthopedics in 2 days.  Take the prescribed medications as directed for pain control.  Return to the ER sooner if you develop fever, redness or warmth of the knee, or inability to bear weight.

## 2025-04-25 ENCOUNTER — OFFICE VISIT (OUTPATIENT)
Dept: OBGYN CLINIC | Facility: CLINIC | Age: 80
End: 2025-04-25
Payer: COMMERCIAL

## 2025-04-25 VITALS — WEIGHT: 175 LBS | HEIGHT: 64 IN | BODY MASS INDEX: 29.88 KG/M2

## 2025-04-25 DIAGNOSIS — T14.8XXA JOINT INJURY: ICD-10-CM

## 2025-04-25 DIAGNOSIS — T84.012A FAILED TOTAL KNEE, RIGHT, INITIAL ENCOUNTER (HCC): Primary | ICD-10-CM

## 2025-04-25 DIAGNOSIS — M17.11 PRIMARY OSTEOARTHRITIS OF RIGHT KNEE: ICD-10-CM

## 2025-04-25 PROCEDURE — 99215 OFFICE O/P EST HI 40 MIN: CPT | Performed by: STUDENT IN AN ORGANIZED HEALTH CARE EDUCATION/TRAINING PROGRAM

## 2025-04-25 RX ORDER — GABAPENTIN 100 MG/1
100 CAPSULE ORAL
COMMUNITY
Start: 2025-02-19 | End: 2026-02-19

## 2025-04-25 RX ORDER — ASCORBIC ACID 500 MG
500 TABLET ORAL DAILY
Qty: 30 TABLET | Refills: 0 | Status: SHIPPED | OUTPATIENT
Start: 2025-04-25

## 2025-04-25 RX ORDER — MULTIVIT-MIN/IRON FUM/FOLIC AC 7.5 MG-4
1 TABLET ORAL DAILY
Qty: 30 TABLET | Refills: 0 | Status: SHIPPED | OUTPATIENT
Start: 2025-04-25

## 2025-04-25 RX ORDER — MUPIROCIN 20 MG/G
OINTMENT TOPICAL DAILY
Qty: 15 G | Refills: 0 | Status: SHIPPED | OUTPATIENT
Start: 2025-04-25

## 2025-04-25 RX ORDER — CHLORHEXIDINE GLUCONATE ORAL RINSE 1.2 MG/ML
15 SOLUTION DENTAL ONCE
OUTPATIENT
Start: 2025-04-25 | End: 2025-04-25

## 2025-04-25 RX ORDER — SODIUM CHLORIDE, SODIUM LACTATE, POTASSIUM CHLORIDE, CALCIUM CHLORIDE 600; 310; 30; 20 MG/100ML; MG/100ML; MG/100ML; MG/100ML
20 INJECTION, SOLUTION INTRAVENOUS CONTINUOUS
OUTPATIENT
Start: 2025-04-25

## 2025-04-25 RX ORDER — FERROUS SULFATE 324(65)MG
324 TABLET, DELAYED RELEASE (ENTERIC COATED) ORAL
Qty: 60 TABLET | Refills: 0 | Status: SHIPPED | OUTPATIENT
Start: 2025-04-25

## 2025-04-25 RX ORDER — CHLORHEXIDINE GLUCONATE 40 MG/ML
SOLUTION TOPICAL DAILY PRN
OUTPATIENT
Start: 2025-04-25

## 2025-04-25 RX ORDER — FOLIC ACID 1 MG/1
1 TABLET ORAL DAILY
Qty: 30 TABLET | Refills: 0 | Status: SHIPPED | OUTPATIENT
Start: 2025-04-25

## 2025-04-25 RX ORDER — SODIUM CHLORIDE, SODIUM LACTATE, POTASSIUM CHLORIDE, CALCIUM CHLORIDE 600; 310; 30; 20 MG/100ML; MG/100ML; MG/100ML; MG/100ML
125 INJECTION, SOLUTION INTRAVENOUS CONTINUOUS
OUTPATIENT
Start: 2025-04-25

## 2025-04-25 NOTE — ASSESSMENT & PLAN NOTE
I had a long discussion with the patient regarding management options. They have completely exhausted conservative measures including medications, activity modification and therapy with minimal relief. Based on their history and physical examination, I have recommended:    Right Revision Total Knee Replacement, one vs two components  - While no guarantees were made, I believe that surgical intervention provides the best chance at providing relief  - WBAT  - ESR, CRP ordered  - Prealbumin ordered  - Consent signed. All questions and concerns addressed  - F/u in 2 weeks post op      Complex revision surgery risks, benefits, and alternatives were discussed with the patient regarding operative intervention. Risks include but are not limited to pain, bleeding, scarring, infection, damage to nerves, arteries, veins, failure of procedure, possible loosening, migration, or hardware failure, possible painful or prominent hardware, joint stiffness, need for further procedures, nonunion, malunion, dislocation, loss of limb, heart attack, stroke and death. Patient voiced understanding and wishes to proceed with operative interventions.        The patients current BMI is Body mass index is 30.04 kg/m². .Patient was counseled about the importance of weight loss prior to surgery, IF BMI is >35 prior to surgery my recommendation is that the patient considers nutritional consultation and further weight loss prior to surgical management. These recommendations were discussed with the patient as increased BMI particularly >40 increases the risk of infection.    IF the patient is a smoker, I discussed the importance of quitting smoking to decrease risk of infection postoperatively and promote good wound healing.     The procedure has been explained and all presented questions have been answered at the present time. The patient may call or come with any other concerns or questions. The patient also understands the post-operative  rehabilitation process and the need for their cooperation and participation, and that their results may be compromised by their lack of compliance. The patient would like to proceed.  The patient is encouraged to seek additional opinions if desired.

## 2025-04-25 NOTE — PROGRESS NOTES
Date: 25  Stormy Hermosillo   MRN# [id number]  : 1945      Chief Complaint: Right Knee Pain    Assessment and Plan:  The patient verbalized understanding of exam findings and treatment plan. We engaged in the shared decision-making process and treatment options were discussed at length with the patient. Surgical and conservative management discussed today along with risks and benefits. Patient was agreeable with the plan and all questions were answered to satisfaction.     Failed total knee, right, initial encounter (HCC)  I had a long discussion with the patient regarding management options. They have completely exhausted conservative measures including medications, activity modification and therapy with minimal relief. Based on their history and physical examination, I have recommended:    Right Revision Total Knee Replacement, one vs two components  - While no guarantees were made, I believe that surgical intervention provides the best chance at providing relief  - WBAT  - ESR, CRP ordered  - Prealbumin ordered  - Consent signed. All questions and concerns addressed  - F/u in 2 weeks post op      Complex revision surgery risks, benefits, and alternatives were discussed with the patient regarding operative intervention. Risks include but are not limited to pain, bleeding, scarring, infection, damage to nerves, arteries, veins, failure of procedure, possible loosening, migration, or hardware failure, possible painful or prominent hardware, joint stiffness, need for further procedures, nonunion, malunion, dislocation, loss of limb, heart attack, stroke and death. Patient voiced understanding and wishes to proceed with operative interventions.        The patients current BMI is Body mass index is 30.04 kg/m². .Patient was counseled about the importance of weight loss prior to surgery, IF BMI is >35 prior to surgery my recommendation is that the patient considers nutritional consultation and  "further weight loss prior to surgical management. These recommendations were discussed with the patient as increased BMI particularly >40 increases the risk of infection.    IF the patient is a smoker, I discussed the importance of quitting smoking to decrease risk of infection postoperatively and promote good wound healing.     The procedure has been explained and all presented questions have been answered at the present time. The patient may call or come with any other concerns or questions. The patient also understands the post-operative rehabilitation process and the need for their cooperation and participation, and that their results may be compromised by their lack of compliance. The patient would like to proceed.  The patient is encouraged to seek additional opinions if desired.             Subjective:     Knee Pain  Patient presents to the clinic today, self referred, , with complaints of right knee pain. This is evaluated as a personal injury. The pain began several months ago. The pain is located medial, lateral and rates their pain as 7/10. She describes the symptoms as aching and sharp. Symptoms improve with rest. The symptoms are worse with activity. The knee has given out or felt unstable. The patient can bend and straighten the knee fully. Treatment to date has been ice, heat, Tylenol, NSAID's, knee sleeve/brace, therapy, without significant relief.     Prior treatment:  NSAIDs Yes    Bracing Yes   Physical Therapy Yes       External Records Reviewed:   ER records, lab reports, office notes, operative reports, radiology reports, and x-ray reports    Orthopedic PMH  None    Orthopedic Surgical History  4/11/2017 - left total knee arthroplasty with Dr. Martin @ [facility]  3/12/24 - right total knee arthroplasty with Dr. Govea @ [facility]    Estimated body mass index is 30.04 kg/m² as calculated from the following:    Height as of this encounter: 5' 4\" (1.626 m).    Weight as of this encounter: 79.4 kg (175 " lb).    Lab Results   Component Value Date    HGBA1C 5.7 (H) 10/11/2024    HGBA1C 6.0 (H) 2024    HGBA1C 5.3 2023   .   Lab Results   Component Value Date    EGFR 62 2025    EGFR 75 10/11/2024    EGFR 69 2024    CREATININE 0.88 2025    CREATININE 0.75 10/11/2024    CREATININE 0.81 2024        Allergy:  Allergies   Allergen Reactions    Tramadol GI Intolerance     Medications:  All current active meds have been reviewed   Past Medical History:  Past Medical History:   Diagnosis Date    Atrial flutter (HCC)     Deep vein thrombosis (HCC)     Hearing aid worn     Pulmonary embolism (HCC)      Past Surgical History:  Past Surgical History:   Procedure Laterality Date    BACK SURGERY      BREAST SURGERY       SECTION      x3    FOOT SURGERY Bilateral     HIP SURGERY Left     JOINT REPLACEMENT      KNEE SURGERY Bilateral     IN NDSC WRST SURG W/RLS TRANSVRS CARPL LIGM Left 2024    Procedure: RELEASE CARPAL TUNNEL ENDOSCOPIC;  Surgeon: Rick Smith MD;  [facility];  Service: Orthopedics    IN OPTX DSTL RADL I-ARTIC FX/EPIPHYSL SEP 3+ FRAG Left 2024    Procedure: OPEN REDUCTION W/ INTERNAL FIXATION (ORIF) RADIUS  (WRIST);  Surgeon: Rick Smith MD;  [facility];  Service: Orthopedics    IN REMOVAL IMPLANT DEEP Left 2024    Procedure: REMOVAL HARDWARE LEFT WRIST;  Surgeon: Rick Smith MD;  [facility];  Service: Orthopedics     Family History:  Family History   Problem Relation Age of Onset    Heart disease Mother     Cancer Mother     Varicose Veins Mother     Heart disease Father     Heart disease Sister     Cancer Sister     Cancer Brother     Varicose Veins Maternal Grandmother      Social History:  Social History     Substance and Sexual Activity   Alcohol Use Yes    Comment: rarely     Social History     Substance and Sexual Activity   Drug Use No     Social History     Tobacco Use   Smoking Status  "Never   Smokeless Tobacco Never           Review of Systems:  General- denies fever/chills  HEENT- denies hearing loss or sore throat  Eyes- denies eye pain or visual disturbances, denies red eyes  Respiratory- denies cough or SOB  Cardio- denies chest pain or palpitations  GI- denies abdominal pain  Endocrine- denies urinary frequency  Urinary- denies pain with urination  Musculoskeletal- Negative except noted above  Skin- denies rashes or wounds  Neurological- denies dizziness or headache  Psychiatric- denies anxiety or difficulty concentrating      Objective:   BP Readings from Last 1 Encounters:   04/23/25 169/80      Wt Readings from Last 1 Encounters:   04/25/25 79.4 kg (175 lb)      Pulse Readings from Last 1 Encounters:   04/23/25 74        BMI: Estimated body mass index is 30.04 kg/m² as calculated from the following:    Height as of this encounter: 5' 4\" (1.626 m).    Weight as of this encounter: 79.4 kg (175 lb).      Physical Exam  Ht 5' 4\" (1.626 m) Comment: verbal  Wt 79.4 kg (175 lb)   BMI 30.04 kg/m²   General/Constitutional: No apparent distress: well-nourished and well developed.  Eyes: normal ocular motion  Cardio: RRR, Normal S1S2, No m/r/g.   Lymphatic: No appreciable lymphadenopathy  Respiratory: Non-labored breathing, CTA b/l no w/c/r  Vascular: No edema, swelling or tenderness, except as noted in detailed exam. Extremities well perfused. No LE edema  Integumentary: No impressive skin lesions present, except as noted in detailed exam.  Neuro: No ataxia or tremors noted  Psych: Normal mood and affect, oriented to person, place and time. Appropriate affect.  Musculoskeletal: Normal, except as noted in detailed exam and in HPI.    Gait and Station:   antalgic    Right Knee:      Inspection:  normal color, temperature, turgor and moisture    Overall limb alignment: neutral    Effusion: significant    ROM 0 to 110 with pain    Extensor Lag: Absent    Palpation: Medial and Lateral joint line " tenderness to palpation    unstable to AP translation at 90 deg    Coronal plane unstable in full extension    Coronal plane unstable in mid-flexion     Motor: 5/5 EHL/FHL/TA/GS/Qd/Hs    Vascular: Toes WWP with BCR    Sensory: SILT DP/SP/Carmela/Saph/Ti        Images:  I personally reviewed relevant images in the PACS system and my interpretation is as follows:    X-rays of the right knee: TKA hardware in expected position without signs of failure or loosening. No fracture or dislocation.         Scribe Attestation      I,:  Mateo Cheema PA-C am acting as a scribe while in the presence of the attending physician.:       I,:  Dayron Kyle MD personally performed the services described in this documentation    as scribed in my presence.:               Dayron Kyle MD  Adult Reconstruction Specialist   [facility]

## 2025-05-05 ENCOUNTER — TELEPHONE (OUTPATIENT)
Dept: OBGYN CLINIC | Facility: CLINIC | Age: 80
End: 2025-05-05

## 2025-05-05 NOTE — TELEPHONE ENCOUNTER
Returned patient's call (she left me VM) regarding pre-op labs. I stated labs/EKG need to be done at a [facility] facility after 5/12. PeriOP is 5/20 with Carol Ann Herrera. Provided my ph [phone] for any other questions.

## 2025-05-08 ENCOUNTER — TELEPHONE (OUTPATIENT)
Dept: OBGYN CLINIC | Facility: HOSPITAL | Age: 80
End: 2025-05-08

## 2025-05-08 NOTE — TELEPHONE ENCOUNTER
Preoperative Elective Admission Assessment- spoke to patient     EKG/LAB/MRSA SWAB/CXR date: going 05/13    Living Situation:    Who does pt live with: spouse  What kind of home: single level  How do they enter the home: garage  How many levels in home: 1  # of steps to enter home: 1  # of steps to second floor: N/A  Are there handrails: N/A  Are there landings: N/A  Sleeping arrangement: first/entry floor  Where is Bathroom: entry level   Where is the tub or shower: walk in shower. Has shower chair.   Toilet height? Concerns for low toilets - Pt has RTS  Dogs or ther pets: 3 dogs      First Floor Setup:   Is there a bathroom: Yes  Where would pt sleep: bed and recliner     DME: Pt has RW; advised to bring dos   We discussed clearing pathways in the home and making sure there is accessibly to use the walker, for example, removing throw rugs.      Patient's Current Level of Function: Ambulates: Independently    Post-op Caregiver: spouse  Caregiver Name and phone number for Inpatient discharge needs: Jose Roberto [phone]      Currently receive any HHC/aides/community supports: No     Post-op Transport: spouse  To/from hospital: spouse  To/from PT 2-3x/week: N/A  Uses community transport now: No     Outpatient Physical Therapy Site:  Site: N/A  pre and post-op appts scheduled? No     Medication Management: self  Preferred Pharmacy for Post-op Medications: [id number] - [place] - [address] [place] [address] [[id number]]   Blood Management Vitamin Regimen: Pt confirms taking as prescribed  Post-op anticoagulant: to be determined by surgical team postoperatively  Has Bactroban for 5 days preop: Yes  Educated on Preoperative Bathing Instructions, and use of Soap for 5 days before surgery.      DC Plan: Pt plans to be discharged home      Barriers to DC identified preoperatively: none identified    BMI: 30.04    Patient Education:  Pt educated on post-op pain, early mobilization (POD0), LOS goals, OP PT goals, and preoperative  bathing. Patient educated that our goal is to appropriately discharge patient based off their post-op function while striving to maintain maximal independence. The goal is to discharge patient to home and for them to attend outpatient physical therapy.    Assigned to care team? Yes    SW referral: no

## 2025-05-09 ENCOUNTER — TELEPHONE (OUTPATIENT)
Dept: OBGYN CLINIC | Facility: HOSPITAL | Age: 80
End: 2025-05-09

## 2025-05-09 NOTE — TELEPHONE ENCOUNTER
Caller: Patient    Doctor: Dr. Kyle    Reason for call: Patient waiting for a call for her assessments today - adv they are 10:30-10:45 & 11:00 - adv they will be calling her directly - gave phone # in case she doesn't hear anything [phone]     Call back#: [phone]

## 2025-05-12 PROBLEM — M80.00XD AGE-RELATED OSTEOPOROSIS WITH CURRENT PATHOLOGICAL FRACTURE WITH ROUTINE HEALING: Status: ACTIVE | Noted: 2024-10-14

## 2025-05-12 PROBLEM — R09.89 RIGHT CAROTID BRUIT: Status: ACTIVE | Noted: 2024-02-27

## 2025-05-12 PROBLEM — Z82.49 FAMILY HISTORY OF ISCHEMIC HEART DISEASE (IHD): Status: ACTIVE | Noted: 2024-02-27

## 2025-05-12 NOTE — PATIENT INSTRUCTIONS
Hold eliquis for 72 hours with last dose by 7am on 6/6/2025 then resume this medication the morning after surgery or unless otherwise instructed by your surgeon  Start taking 6/7/2025 am start taking ASA 81mg in the morning through 6/9/2025  Then stop ASA and resume eliquis 6/10/2025  Take amlodipine and metoprolol the morning of surgery  Skip fosamax dose prior to surgery      BEFORE SURGERY    Contact your surgical nurse navigator or surgical provider with any questions regarding preoperative plan or schedule.  Stop all over the counter supplements, herbal, naturopathic  medications for 1 week prior to surgery UNLESS prescribed by your surgeon  Hold NSAIDS (i.e. advil, alleve, motrin, ibuprofen, celebrex) minimum 5 days prior to surgery  Follow presurgical medication instructions provided by preadmission nursing team reviewed during your presurgery phone call  Strategies for optimizing your surgery through breathing exercises, nutrition and physical activity can be found at [email or web address]  Call [phone] with any presurgical concerns or medications questions or use the messaging feature in your [employer] sarah to contact your provider    AFTER SURGERY    Recommend using Tylenol ( acetaminophen ) 1000 mg every eight hours during the first week post discharge along with icing the area for 20 mins every 3-4 hours while awake can be helpful in reducing your need for post operative opioid use. This opioid sparing plan can be used along side your surgeons pain plan.  Use stool softener over the counter (colace) daily after surgery during the first 1-2 weeks to avoid post operative constipation issues  If no bowel movement within 3 days after surgery then use over the counter Miralax in addition to your stool softener   If cleared by your surgical team for activity then early and often walking is encouraged and can be important in prevention of post surgical blood clots. Additionally spend as much time out of bed as  possible and allowed by your surgical team  Use your incentive spirometer twice per hour in the first seven days after surgery to help prevent post surgery lung complications and infections  It is very important you follow the instructions from your surgeon regarding any medications for after surgery blood clot prevention. Compliance with these medications or interventions is very important.  Call [phone] with any post discharge concerns or medical issues or use the messaging feature in your [employer] sarah to contact your provider

## 2025-05-12 NOTE — ASSESSMENT & PLAN NOTE
S/p R TKA 3/2024  F/b pulmonology and they recommended decreased eliquis 2.5mg bid d/t advanced age

## 2025-05-12 NOTE — PROGRESS NOTES
Internal Medicine Pre-Operative Evaluation:     Reason for Visit: Pre-operative Evaluation for Risk Stratification and Optimization    Patient ID: Stormy Hermosillo is a 79 y.o. female.     Case: [id number] Date/Time: 06/09/25 1220   Procedure: ARTHROPLASTY RIGHT KNEE TOTAL REVISION, 1 vs 2 components (Right: Knee)   Anesthesia type: Choice   Diagnosis: Failed total knee, right, initial encounter ([facility]) [T84.012A]   Pre-op diagnosis: Failed total knee, right, initial encounter ([facility]) [T84.012A]   Location:  OR ROOM 03 / [facility]   Surgeons: Dayron Kyle MD          Recommendations to Proceed withSurgery    Patient is considered to be Low risk for Medium risk procedure.     After evaluation and discussion with patient with emphasis that all surgery has some degree of inherent risk it is acknowledged by patient this risk is Acceptable.    Patient is optimized and may proceed with planned procedure.     Assessment    Pre-operative Medical Evaluation for planned surgery  Recommendations as listed in PLAN section below  Contact surgical nurse  navigator with any questions regarding preoperative plan or schedule.      Assessment & Plan  Preoperative clearance    Failed total knee, right, initial encounter ([facility])  For surgical revision as above  Acute deep vein thrombosis (DVT) of distal vein of right lower extremity (HCC)  See below  Pulmonary embolism, bilateral (HCC)  S/p R TKA 3/2024  F/b pulmonology and they recommended decreased eliquis 2.5mg bid d/t advanced age  Atrial flutter, paroxysmal (HCC)  S/p R TKA 3/2024  No ventricular strain per echo  F/b cardiology  Cont metoprolol for rate control  PAF was found on ziopatch  Saw cardiology back in 10/2024  Note says to continue all medications           Plan:     1. Further preoperative workup as follows:   - none no further testing required may proceed with surgery    2. Preoperative Medication Management Review performed  by PAT nursing  YES    3. Patient requires further consultation with:   No Consults Required    4. Discharge Planning / Barriers to Discharge  none identified - patients has post discharge therapy plan in place, transportation arranged for discharge day, adequate family support at home to assist with discharge to home.        Subjective:           History of Present Illness:     Stormy Hermosillo is a 79 y.o. female who presents to the office today for a preoperative consultation at the request of surgeon. The patient understands this is an elective procedure and not emergent. They are electing to undergo planned procedure with an understanding that all surgery has inherent risk. They have worked with their surgeon and failed conservative treatment measures. Today they present for preoperative risk assessment and recommendations for optimization in preparation for surgery.    Pt seen in center for perioperative medicine for upcoming proposed surgery. They have failed previous conservative measures and have elected surgical intervention.     Pt meets presurgical lab and BMI optimization goals.    Pt is taking eliquis for h/o PE and PAF, she is on the lower dose d/t advanced age. SHe was also taking ASA 81mg bid, we discussed that d/t the eliquis she did not need both and she will stop the ASA. She is going to take ASA 81mg daily through surgery as she is quite worried about repeat blood clots.       Stormy Hermosillo has an IN HOSPITAL cardiac risk of RCI RISK CLASS I (0 risk factors, risk of major cardiac compl. appr. 0.5%) based on RCRI calculator    Cardiac Risk Estimation: per the Revised Cardiac Risk Index (Circ. 100:1043, 1999),         Pre-op Exam    Previous history of bleeding disorders or clots?: Yes  Previous Anesthesia reaction?: No  Prolonged steroid use in the last 6 months?: No    Assessment of Cardiac Risk:   - Unstable or severe angina or MI in the last 6 weeks or history of stent placement in the  "last year?: No   - Decompensated heart failure (e.g. New onset heart failure, NYHA  Class IV heart failure, or worsening existing heart failure)?: No  - Significant arrhythmias such as high grade AV block, symptomatic ventricular arrhythmia, newly recognized ventricular tachycardia, supraventricular tachycardia with resting heart rate >100, or symptomatic bradycardia?: No  - Severe heart valve disease including aortic stenosis or symptomatic mitral stenosis?: No      Pre-operative Risk Factors:  Elevated-risk surgery: No    History of cerebrovascular disease: No    History of ischemic heart disease: No  Pre-operative treatment with insulin: No  Pre-operative creatinine >2 mg/dL: No    History of congestive heart failure: No    Duke Activity Status Index (DASI):   DASI Total Score: 23.45  METs: 5.6        ROS: No TIA's or unusual headaches, no dysphagia.  No prolonged cough. No dyspnea or chest pain on exertion.  No abdominal pain, change in bowel habits, black or bloody stools.  No urinary tract or BPH symptoms.  Positive reported pain in arthritic joint. Positive difficulty with gait. No skin rashes or issues.      Objective:    /78 (BP Location: Left arm, Patient Position: Sitting, Cuff Size: Standard)   Ht 5' 4.37\" (1.635 m)   Wt 77.6 kg (171 lb)   BMI 29.02 kg/m²       General Appearance: no distress, conversive  HEENT: PERRLA, conjuctiva normal; oropharynx clear; mucous membranes moist;   Neck:  Supple, no lymphadenopathy or thyromegaly  Lungs: breath sounds normal, normal respiratory effort, no retractions, expiratory effort normal  CV: normal heart sounds S1/S2, PMI normal   ABD: soft non tender, +BS x4  EXT: DP pulses intact, no lymphadenopathy, no edema  Skin: normal turgor, normal texture, no rash  Psych: affect normal, mood normal  Neuro: AAOx3        The following portions of the patient's history were reviewed and updated as appropriate: allergies, current medications, past family history, past " medical history, past social history, past surgical history and problem list.     Past History:       Past Medical History:   Diagnosis Date    Anesthesia complication     Per pt was told anes needs to beware of spinal sx hx    Atrial flutter (HCC)     Colon polyp     Deep vein thrombosis (HCC)     GERD (gastroesophageal reflux disease)     Hearing aid worn     Kidney stone     Pulmonary embolism (HCC)     Past Surgical History:   Procedure Laterality Date    BACK SURGERY      Per pt anes needs to beware of spinal sx hx    BREAST SURGERY       SECTION      x3    COLONOSCOPY      FOOT SURGERY Bilateral     HIP SURGERY Left     JOINT REPLACEMENT      KNEE SURGERY Bilateral     NE NDSC WRST SURG W/RLS TRANSVRS CARPL LIGM Left 2024    Procedure: RELEASE CARPAL TUNNEL ENDOSCOPIC;  Surgeon: Rick Smith MD;  Location: [facility];  Service: Orthopedics    NE OPTX DSTL RADL I-ARTIC FX/EPIPHYSL SEP 3+ FRAG Left 2024    Procedure: OPEN REDUCTION W/ INTERNAL FIXATION (ORIF) RADIUS  (WRIST);  Surgeon: Rick Smith MD;  Location: [facility];  Service: Orthopedics    NE REMOVAL IMPLANT DEEP Left 2024    Procedure: REMOVAL HARDWARE LEFT WRIST;  Surgeon: Rick Smith MD;  Location: [facility];  Service: Orthopedics          Social History     Tobacco Use    Smoking status: Never    Smokeless tobacco: Never   Vaping Use    Vaping status: Never Used   Substance Use Topics    Alcohol use: Yes     Comment: rarely    Drug use: No     Family History   Problem Relation Age of Onset    Heart disease Mother     Cancer Mother     Varicose Veins Mother     Heart disease Father     Heart disease Sister     Cancer Sister     Cancer Brother     Varicose Veins Maternal Grandmother           Allergies:     Allergies   Allergen Reactions    Tramadol GI Intolerance        Current Medications:     Current Outpatient Medications   Medication Instructions    acetaminophen (TYLENOL) 500 mg, Every 6  "hours    amLODIPine (NORVASC) 5 mg, Daily    apixaban (ELIQUIS) 2.5 mg, Oral, 2 times daily    ascorbic acid (VITAMIN C) 500 mg, Oral, Daily, Start 30 days prior to surgery    atorvastatin (LIPITOR) 10 mg, Daily    Calcium Carbonate-Vitamin D (CALCIUM 600+D PO) Take by mouth    ferrous sulfate 324 mg, Oral, 2 times daily before meals, Start 30 days prior to surgery    folic acid (FOLVITE) 1 mg, Oral, Daily, Start 30 days prior to surgery    Fosamax 70 mg, Every 7 days    guaiFENesin (MUCINEX) 600 mg    metoprolol succinate (TOPROL-XL) 25 mg, Oral, Daily    mometasone (NASONEX) 50 mcg/act nasal spray 2 sprays, Daily    Multiple Vitamins-Minerals (ICAPS AREDS 2 PO) Take by mouth    Multiple Vitamins-Minerals (MULTIVITAMIN ADULT PO) 1 tablet    Multiple Vitamins-Minerals (multivitamin with minerals) tablet 1 tablet, Oral, Daily, Start 30 days prior to procedure.    mupirocin (BACTROBAN) 2 % ointment Topical, Daily    omeprazole (PRILOSEC) 20 mg, Daily    White Petrolatum-Mineral Oil (artificial tear) 83-15 % ophthalmic ointment Daily at bedtime           PRE-OP WORKSHEET DATA    Assessment of Pre-Operative Risks     MLJ Quality Hard Stops:    BMI (<40) : Estimated body mass index is 29.02 kg/m² as calculated from the following:    Height as of this encounter: 5' 4.37\" (1.635 m).    Weight as of this encounter: 77.6 kg (171 lb).    Hgb ( >11):   Lab Results   Component Value Date    HGB 14.0 05/13/2025    HGB 14.8 10/11/2024    HGB 14.2 06/19/2024       HbA1c (<7.5) :   Lab Results   Component Value Date    HGBA1C 5.7 (H) 05/13/2025       GFR (>60) (Less then 45 = Nephrology consult):    Lab Results   Component Value Date    EGFR 75 05/13/2025    EGFR 62 01/09/2025    EGFR 75 10/11/2024            Pre-Op Data Reviewed:       Laboratory Results: I have personally reviewed the pertinent reports    EKG: I personally reviewed and interpreted available tracings in the electronic medical record    POCT EKG 5/19/2025 PCP " office=normal     OLD RECORDS: reviewed old records in the chart review section if EHR on day of visit.    Previous cardiopulmonary studies within the past year:  Echocardiogram:  Lab Results   Component Value Date    LVEF 65 06/25/2024       Previous STRESS TEST:  No results found for this or any previous visit.         Previous Cath/PCI:  No results found for this or any previous visit.        ECHO:  Results for orders placed during the hospital encounter of 03/18/24    Echo complete w/ contrast if indicated    Interpretation Summary    Left Ventricle: Left ventricular cavity size is normal. Wall thickness is mildly increased. The left ventricular ejection fraction is 65%. Systolic function is normal. Diastolic function is mildly abnormal, consistent with grade I (abnormal) relaxation.    Right Ventricle: Right ventricle is not well visualized. Right ventricular cavity size is dilated. Visually estimated systolic function is mildly reduced.  There is free wall hypokinesis.  However there is normal tricuspid annular plane systolic excursion (TAPSE) > 1.7 cm.    Aortic Valve: There is mild regurgitation. There is aortic valve sclerosis.    Tricuspid Valve: There is moderate regurgitation. The right ventricular systolic pressure is mildly to moderately elevated. The estimated right ventricular systolic pressure is 52.00 mmHg.    Pulmonic Valve: There is mild regurgitation.    Pericardium: There is no pericardial effusion.        Cardiac testing:   No results found for this or any previous visit.        Time of visit including pre-visit chart review, visit and post-visit coordination of plan and care , review of pre-surgical lab work, preparation and time spent documenting note in electronic medical record, time spent face-to-face in physical examination answering patient questions by care team 35 minutes             [facility]

## 2025-05-12 NOTE — ASSESSMENT & PLAN NOTE
S/p R TKA 3/2024  No ventricular strain per echo  F/b cardiology  Cont metoprolol for rate control  PAF was found on ziopatch  Saw cardiology back in 10/2024  Note says to continue all medications

## 2025-05-13 ENCOUNTER — LAB REQUISITION (OUTPATIENT)
Dept: LAB | Facility: HOSPITAL | Age: 80
End: 2025-05-13
Payer: COMMERCIAL

## 2025-05-13 ENCOUNTER — APPOINTMENT (OUTPATIENT)
Dept: LAB | Facility: CLINIC | Age: 80
End: 2025-05-13
Payer: COMMERCIAL

## 2025-05-13 DIAGNOSIS — T84.012A FAILED TOTAL KNEE, RIGHT, INITIAL ENCOUNTER (HCC): ICD-10-CM

## 2025-05-13 DIAGNOSIS — T84.012A BROKEN INTERNAL RIGHT KNEE PROSTHESIS, INITIAL ENCOUNTER (HCC): ICD-10-CM

## 2025-05-13 DIAGNOSIS — T14.8XXA JOINT INJURY: ICD-10-CM

## 2025-05-13 LAB
ABO GROUP BLD: NORMAL
ALBUMIN SERPL BCG-MCNC: 4.1 G/DL (ref 3.5–5)
ALP SERPL-CCNC: 67 U/L (ref 34–104)
ALT SERPL W P-5'-P-CCNC: 15 U/L (ref 7–52)
ANION GAP SERPL CALCULATED.3IONS-SCNC: 10 MMOL/L (ref 4–13)
APTT PPP: 26 SECONDS (ref 23–34)
AST SERPL W P-5'-P-CCNC: 18 U/L (ref 13–39)
BASOPHILS # BLD AUTO: 0.09 THOUSANDS/ÂΜL (ref 0–0.1)
BASOPHILS NFR BLD AUTO: 1 % (ref 0–1)
BILIRUB SERPL-MCNC: 0.66 MG/DL (ref 0.2–1)
BLD GP AB SCN SERPL QL: NEGATIVE
BUN SERPL-MCNC: 21 MG/DL (ref 5–25)
CALCIUM SERPL-MCNC: 9.2 MG/DL (ref 8.4–10.2)
CHLORIDE SERPL-SCNC: 106 MMOL/L (ref 96–108)
CO2 SERPL-SCNC: 26 MMOL/L (ref 21–32)
CREAT SERPL-MCNC: 0.75 MG/DL (ref 0.6–1.3)
CRP SERPL QL: 8 MG/L
EOSINOPHIL # BLD AUTO: 0.37 THOUSAND/ÂΜL (ref 0–0.61)
EOSINOPHIL NFR BLD AUTO: 5 % (ref 0–6)
ERYTHROCYTE [DISTWIDTH] IN BLOOD BY AUTOMATED COUNT: 13.2 % (ref 11.6–15.1)
ERYTHROCYTE [SEDIMENTATION RATE] IN BLOOD: 26 MM/HOUR (ref 0–29)
GFR SERPL CREATININE-BSD FRML MDRD: 75 ML/MIN/1.73SQ M
GLUCOSE P FAST SERPL-MCNC: 93 MG/DL (ref 65–99)
HCT VFR BLD AUTO: 45.8 % (ref 34.8–46.1)
HGB BLD-MCNC: 14 G/DL (ref 11.5–15.4)
IMM GRANULOCYTES # BLD AUTO: 0.03 THOUSAND/UL (ref 0–0.2)
IMM GRANULOCYTES NFR BLD AUTO: 0 % (ref 0–2)
INR PPP: 0.95 (ref 0.85–1.19)
LYMPHOCYTES # BLD AUTO: 2.19 THOUSANDS/ÂΜL (ref 0.6–4.47)
LYMPHOCYTES NFR BLD AUTO: 29 % (ref 14–44)
MCH RBC QN AUTO: 28.5 PG (ref 26.8–34.3)
MCHC RBC AUTO-ENTMCNC: 30.6 G/DL (ref 31.4–37.4)
MCV RBC AUTO: 93 FL (ref 82–98)
MONOCYTES # BLD AUTO: 0.86 THOUSAND/ÂΜL (ref 0.17–1.22)
MONOCYTES NFR BLD AUTO: 12 % (ref 4–12)
NEUTROPHILS # BLD AUTO: 3.95 THOUSANDS/ÂΜL (ref 1.85–7.62)
NEUTS SEG NFR BLD AUTO: 53 % (ref 43–75)
NRBC BLD AUTO-RTO: 0 /100 WBCS
PLATELET # BLD AUTO: 418 THOUSANDS/UL (ref 149–390)
PMV BLD AUTO: 9.9 FL (ref 8.9–12.7)
POTASSIUM SERPL-SCNC: 5.1 MMOL/L (ref 3.5–5.3)
PREALB SERPL-MCNC: 26.4 MG/DL (ref 17–34)
PROT SERPL-MCNC: 6.5 G/DL (ref 6.4–8.4)
PROTHROMBIN TIME: 13 SECONDS (ref 12.3–15)
RBC # BLD AUTO: 4.91 MILLION/UL (ref 3.81–5.12)
RH BLD: POSITIVE
SODIUM SERPL-SCNC: 142 MMOL/L (ref 135–147)
SPECIMEN EXPIRATION DATE: NORMAL
WBC # BLD AUTO: 7.49 THOUSAND/UL (ref 4.31–10.16)

## 2025-05-13 PROCEDURE — 85610 PROTHROMBIN TIME: CPT

## 2025-05-13 PROCEDURE — 86901 BLOOD TYPING SEROLOGIC RH(D): CPT

## 2025-05-13 PROCEDURE — 85652 RBC SED RATE AUTOMATED: CPT

## 2025-05-13 PROCEDURE — 86850 RBC ANTIBODY SCREEN: CPT

## 2025-05-13 PROCEDURE — 85025 COMPLETE CBC W/AUTO DIFF WBC: CPT

## 2025-05-13 PROCEDURE — 80053 COMPREHEN METABOLIC PANEL: CPT

## 2025-05-13 PROCEDURE — 83036 HEMOGLOBIN GLYCOSYLATED A1C: CPT

## 2025-05-13 PROCEDURE — 84134 ASSAY OF PREALBUMIN: CPT

## 2025-05-13 PROCEDURE — 86140 C-REACTIVE PROTEIN: CPT

## 2025-05-13 PROCEDURE — 86900 BLOOD TYPING SEROLOGIC ABO: CPT

## 2025-05-13 PROCEDURE — 85730 THROMBOPLASTIN TIME PARTIAL: CPT

## 2025-05-13 PROCEDURE — 36415 COLL VENOUS BLD VENIPUNCTURE: CPT

## 2025-05-13 PROCEDURE — 87081 CULTURE SCREEN ONLY: CPT

## 2025-05-14 LAB
EST. AVERAGE GLUCOSE BLD GHB EST-MCNC: 117 MG/DL
HBA1C MFR BLD: 5.7 %
MRSA NOSE QL CULT: NORMAL

## 2025-05-20 ENCOUNTER — OFFICE VISIT (OUTPATIENT)
Age: 80
End: 2025-05-20
Payer: COMMERCIAL

## 2025-05-20 VITALS
BODY MASS INDEX: 29.19 KG/M2 | SYSTOLIC BLOOD PRESSURE: 115 MMHG | HEIGHT: 64 IN | DIASTOLIC BLOOD PRESSURE: 78 MMHG | WEIGHT: 171 LBS

## 2025-05-20 DIAGNOSIS — T84.012A FAILED TOTAL KNEE, RIGHT, INITIAL ENCOUNTER (HCC): ICD-10-CM

## 2025-05-20 DIAGNOSIS — I82.4Z1 ACUTE DEEP VEIN THROMBOSIS (DVT) OF DISTAL VEIN OF RIGHT LOWER EXTREMITY (HCC): ICD-10-CM

## 2025-05-20 DIAGNOSIS — Z01.818 PREOPERATIVE CLEARANCE: Primary | ICD-10-CM

## 2025-05-20 DIAGNOSIS — I48.92 ATRIAL FLUTTER, PAROXYSMAL (HCC): ICD-10-CM

## 2025-05-20 DIAGNOSIS — I26.99 PULMONARY EMBOLISM, BILATERAL (HCC): ICD-10-CM

## 2025-05-20 PROCEDURE — 99204 OFFICE O/P NEW MOD 45 MIN: CPT | Performed by: NURSE PRACTITIONER

## 2025-06-05 ENCOUNTER — TELEPHONE (OUTPATIENT)
Age: 80
End: 2025-06-05

## 2025-06-05 NOTE — TELEPHONE ENCOUNTER
Caller: Patient    Doctor: Dr. Kyle    Reason for call: Stated she received a message that her sx 6/9 has been cancelled. Advised the patient nothing has been noted in her chart. Please contact the patient to discuss    Call back#: [phone]

## 2025-06-06 ENCOUNTER — TELEPHONE (OUTPATIENT)
Dept: OBGYN CLINIC | Facility: HOSPITAL | Age: 80
End: 2025-06-06

## 2025-06-06 NOTE — TELEPHONE ENCOUNTER
Caller: pt     Doctor: Dr. Esteban     Reason for call: pt is calling back stating that she doesn't understand why her sx had been cancelled last min. Unable to contact team.     Requesting call back and provided Evelin's direct contact number.    Call back#: Phone: [phone]

## 2025-06-06 NOTE — TELEPHONE ENCOUNTER
Spoke with patient on 6/5 regarding cancelled surgery upcoming.  Attempted to call patient to answer follow up questions on 6/6 unable to leave message due to no voicemail.

## 2025-07-08 ENCOUNTER — TELEPHONE (OUTPATIENT)
Dept: OBGYN CLINIC | Facility: CLINIC | Age: 80
End: 2025-07-08

## 2025-07-08 NOTE — TELEPHONE ENCOUNTER
Spoke with patient to relay the reason for the cancelled surgery was due to a change in Dr. Kyle's practice.  Patient understood and was concerned if her insurance would not pay for her appointments if the surgery would be rescheduled.  I asked the patient to call if she had any pushback from an insurance standpoint.

## (undated) DEVICE — SPLINT 4 X 15 IN FAST SET PLASTER

## (undated) DEVICE — GLOVE INDICATOR PI UNDERGLOVE SZ 7 BLUE

## (undated) DEVICE — DRAPE C-ARM X-RAY

## (undated) DEVICE — INTENDED FOR TISSUE SEPARATION, AND OTHER PROCEDURES THAT REQUIRE A SHARP SURGICAL BLADE TO PUNCTURE OR CUT.: Brand: BARD-PARKER ® CARBON RIB-BACK BLADES

## (undated) DEVICE — GLOVE SRG BIOGEL 6.5

## (undated) DEVICE — GAUZE SPONGES,16 PLY: Brand: CURITY

## (undated) DEVICE — SUT VICRYL 4-0 SH 27 IN J415H

## (undated) DEVICE — 2.7MM X 10MM NON-LOCKING HEXALOBE SCREW
Type: IMPLANTABLE DEVICE | Site: WRIST | Status: NON-FUNCTIONAL
Brand: ACUMED
Removed: 2024-06-26

## (undated) DEVICE — GLOVE SRG BIOGEL ECLIPSE 7

## (undated) DEVICE — NEEDLE 25GA X 1 IN SAFETY GLIDE

## (undated) DEVICE — OCCLUSIVE GAUZE STRIP,3% BISMUTH TRIBROMOPHENATE IN PETROLATUM BLEND: Brand: XEROFORM

## (undated) DEVICE — PAD CAST 4 IN COTTON NON STERILE

## (undated) DEVICE — 2.0MM QUICK RELEASE DRILL: Brand: ACUMED

## (undated) DEVICE — ACE WRAP 3 IN UNSTERILE

## (undated) DEVICE — EXOFIN PRECISION PEN HIGH VISCOSITY TOPICAL SKIN ADHESIVE: Brand: EXOFIN PRECISION PEN, 1G

## (undated) DEVICE — SYSTEM ENDOSCOPIC CARPAL TUNNEL RELEASE DISP

## (undated) DEVICE — SUT MONOCRYL 4-0 PS-2 27 IN Y426H

## (undated) DEVICE — SPONGE SCRUB 4 PCT CHLORHEXIDINE

## (undated) DEVICE — STERILE BETHLEHEM PLASTIC HAND: Brand: CARDINAL HEALTH

## (undated) DEVICE — ACE WRAP 4 IN STERILE

## (undated) DEVICE — 3.5MM X 16MM NON-LOCKING HEXALOBE SCREW
Type: IMPLANTABLE DEVICE | Site: WRIST | Status: NON-FUNCTIONAL
Brand: ACUMED

## (undated) DEVICE — PADDING CAST 4 IN  COTTON STRL

## (undated) DEVICE — ACE WRAP 3 IN STERILE

## (undated) DEVICE — 2.8MM QUICK RELEASE DRILL: Brand: ACUMED

## (undated) DEVICE — DRESSING MEPILEX AG BORDER 4 X 4 IN

## (undated) DEVICE — TUBING SUCTION 5MM X 12 FT

## (undated) DEVICE — CUFF TOURNIQUET 18 X 4 IN QUICK CONNECT DISP 1 BLADDER

## (undated) DEVICE — SUT CHROMIC 4-0 P-3 18 MM 1654G

## (undated) DEVICE — DRESSING MEPILEX AG BORDER POST-OP 4 X 8 IN

## (undated) DEVICE — ANTI-FOG SOLUTION WITH FOAM PAD: Brand: DEVON

## (undated) DEVICE — ARM SLING: Brand: DEROYAL